# Patient Record
Sex: MALE | Race: WHITE | NOT HISPANIC OR LATINO | Employment: OTHER | URBAN - METROPOLITAN AREA
[De-identification: names, ages, dates, MRNs, and addresses within clinical notes are randomized per-mention and may not be internally consistent; named-entity substitution may affect disease eponyms.]

---

## 2017-03-23 ENCOUNTER — ALLSCRIPTS OFFICE VISIT (OUTPATIENT)
Dept: OTHER | Facility: OTHER | Age: 67
End: 2017-03-23

## 2017-03-23 LAB
BILIRUB UR QL STRIP: NORMAL
CLARITY UR: NORMAL
COLOR UR: YELLOW
GLUCOSE (HISTORICAL): NORMAL
HGB UR QL STRIP.AUTO: NORMAL
KETONES UR STRIP-MCNC: NORMAL MG/DL
LEUKOCYTE ESTERASE UR QL STRIP: NORMAL
NITRITE UR QL STRIP: NORMAL
PH UR STRIP.AUTO: 5 [PH]
PROT UR STRIP-MCNC: NORMAL MG/DL
SP GR UR STRIP.AUTO: 1.01
UROBILINOGEN UR QL STRIP.AUTO: NORMAL

## 2017-10-30 ENCOUNTER — HOSPITAL ENCOUNTER (EMERGENCY)
Facility: HOSPITAL | Age: 67
Discharge: HOME | End: 2017-10-30
Attending: EMERGENCY MEDICINE | Admitting: EMERGENCY MEDICINE
Payer: MEDICARE

## 2017-10-30 ENCOUNTER — APPOINTMENT (EMERGENCY)
Dept: RADIOLOGY | Facility: HOSPITAL | Age: 67
End: 2017-10-30
Payer: MEDICARE

## 2017-10-30 VITALS
WEIGHT: 166.5 LBS | SYSTOLIC BLOOD PRESSURE: 137 MMHG | RESPIRATION RATE: 14 BRPM | HEART RATE: 72 BPM | TEMPERATURE: 97.8 F | OXYGEN SATURATION: 98 % | DIASTOLIC BLOOD PRESSURE: 80 MMHG

## 2017-10-30 DIAGNOSIS — R07.9 CHEST PAIN, UNSPECIFIED TYPE: Primary | ICD-10-CM

## 2017-10-30 LAB
ALBUMIN SERPL BCP-MCNC: 4 G/DL (ref 3.5–5)
ALP SERPL-CCNC: 62 U/L (ref 46–116)
ALT SERPL W P-5'-P-CCNC: 45 U/L (ref 12–78)
ANION GAP SERPL CALCULATED.3IONS-SCNC: 10 MMOL/L (ref 4–13)
AST SERPL W P-5'-P-CCNC: 22 U/L (ref 5–45)
ATRIAL RATE: 75 BPM
ATRIAL RATE: 86 BPM
BASOPHILS # BLD AUTO: 0.04 THOUSANDS/ΜL (ref 0–0.1)
BASOPHILS NFR BLD AUTO: 1 % (ref 0–1)
BILIRUB SERPL-MCNC: 0.4 MG/DL (ref 0.2–1)
BUN SERPL-MCNC: 14 MG/DL (ref 5–25)
CALCIUM SERPL-MCNC: 9 MG/DL (ref 8.3–10.1)
CHLORIDE SERPL-SCNC: 105 MMOL/L (ref 100–108)
CO2 SERPL-SCNC: 24 MMOL/L (ref 21–32)
CREAT SERPL-MCNC: 0.97 MG/DL (ref 0.6–1.3)
EOSINOPHIL # BLD AUTO: 0.15 THOUSAND/ΜL (ref 0–0.61)
EOSINOPHIL NFR BLD AUTO: 2 % (ref 0–6)
ERYTHROCYTE [DISTWIDTH] IN BLOOD BY AUTOMATED COUNT: 12.7 % (ref 11.6–15.1)
GFR SERPL CREATININE-BSD FRML MDRD: 80 ML/MIN/1.73SQ M
GLUCOSE SERPL-MCNC: 113 MG/DL (ref 65–140)
HCT VFR BLD AUTO: 46.1 % (ref 36.5–49.3)
HGB BLD-MCNC: 15.8 G/DL (ref 12–17)
LYMPHOCYTES # BLD AUTO: 1.44 THOUSANDS/ΜL (ref 0.6–4.47)
LYMPHOCYTES NFR BLD AUTO: 21 % (ref 14–44)
MCH RBC QN AUTO: 30.6 PG (ref 26.8–34.3)
MCHC RBC AUTO-ENTMCNC: 34.3 G/DL (ref 31.4–37.4)
MCV RBC AUTO: 89 FL (ref 82–98)
MONOCYTES # BLD AUTO: 0.54 THOUSAND/ΜL (ref 0.17–1.22)
MONOCYTES NFR BLD AUTO: 8 % (ref 4–12)
NEUTROPHILS # BLD AUTO: 4.6 THOUSANDS/ΜL (ref 1.85–7.62)
NEUTS SEG NFR BLD AUTO: 68 % (ref 43–75)
P AXIS: 62 DEGREES
P AXIS: 64 DEGREES
PLATELET # BLD AUTO: 252 THOUSANDS/UL (ref 149–390)
PMV BLD AUTO: 9.3 FL (ref 8.9–12.7)
POTASSIUM SERPL-SCNC: 3.2 MMOL/L (ref 3.5–5.3)
PR INTERVAL: 162 MS
PR INTERVAL: 176 MS
PROT SERPL-MCNC: 7.4 G/DL (ref 6.4–8.2)
QRS AXIS: 15 DEGREES
QRS AXIS: 2 DEGREES
QRSD INTERVAL: 76 MS
QRSD INTERVAL: 82 MS
QT INTERVAL: 358 MS
QT INTERVAL: 360 MS
QTC INTERVAL: 402 MS
QTC INTERVAL: 428 MS
RBC # BLD AUTO: 5.17 MILLION/UL (ref 3.88–5.62)
SODIUM SERPL-SCNC: 139 MMOL/L (ref 136–145)
T WAVE AXIS: 57 DEGREES
T WAVE AXIS: 61 DEGREES
TROPONIN I SERPL-MCNC: <0.02 NG/ML
TROPONIN I SERPL-MCNC: <0.02 NG/ML
VENTRICULAR RATE: 75 BPM
VENTRICULAR RATE: 86 BPM
WBC # BLD AUTO: 6.77 THOUSAND/UL (ref 4.31–10.16)

## 2017-10-30 PROCEDURE — 71020 HB CHEST X-RAY 2VW FRONTAL&LATL: CPT

## 2017-10-30 PROCEDURE — 85025 COMPLETE CBC W/AUTO DIFF WBC: CPT | Performed by: EMERGENCY MEDICINE

## 2017-10-30 PROCEDURE — 36415 COLL VENOUS BLD VENIPUNCTURE: CPT

## 2017-10-30 PROCEDURE — 84484 ASSAY OF TROPONIN QUANT: CPT | Performed by: EMERGENCY MEDICINE

## 2017-10-30 PROCEDURE — 80053 COMPREHEN METABOLIC PANEL: CPT | Performed by: EMERGENCY MEDICINE

## 2017-10-30 PROCEDURE — 99285 EMERGENCY DEPT VISIT HI MDM: CPT

## 2017-10-30 PROCEDURE — 93005 ELECTROCARDIOGRAM TRACING: CPT | Performed by: EMERGENCY MEDICINE

## 2017-10-30 RX ORDER — ASPIRIN 81 MG/1
162 TABLET, CHEWABLE ORAL ONCE
Status: COMPLETED | OUTPATIENT
Start: 2017-10-30 | End: 2017-10-30

## 2017-10-30 RX ORDER — LORAZEPAM 0.5 MG/1
0.5 TABLET ORAL ONCE
Status: COMPLETED | OUTPATIENT
Start: 2017-10-30 | End: 2017-10-30

## 2017-10-30 RX ADMIN — ASPIRIN 81 MG 162 MG: 81 TABLET ORAL at 07:22

## 2017-10-30 RX ADMIN — LORAZEPAM 0.5 MG: 0.5 TABLET ORAL at 07:22

## 2017-10-30 NOTE — ED PROVIDER NOTES
History  Chief Complaint   Patient presents with    Chest Pain     Ptpresents to ED for evaluation and treatment of chest pressure since 0500     79 yr male  With no mh-- states somewhere between 5 and 6 am with gradual onset of ant to left sided chest pressure -- which has been constant- no radiation - no n/v/- no sob/ no diaphoresis--  2 episodes of heartburn symptoms with meals over the last several weeks- no progressive dysphagia/ weight loss- melena- vomitus - no uri/cough - no hx of vte- no pleuritic or abrupt cp         History provided by:  Patient   used: No    Chest Pain   Associated symptoms: no cough, no palpitations and no shortness of breath        None       No past medical history on file  Past Surgical History:   Procedure Laterality Date    CHOLECYSTECTOMY         No family history on file  I have reviewed and agree with the history as documented  Social History   Substance Use Topics    Smoking status: Never Smoker    Smokeless tobacco: Not on file    Alcohol use 2 4 oz/week     4 Cans of beer per week        Review of Systems   Constitutional: Negative  HENT: Negative  Eyes: Negative  Respiratory: Negative  Negative for apnea, cough, choking, chest tightness, shortness of breath, wheezing and stridor  Cardiovascular: Positive for chest pain  Negative for palpitations and leg swelling  Gastrointestinal: Negative  Endocrine: Negative  Genitourinary: Negative  Musculoskeletal: Negative  Skin: Negative  Allergic/Immunologic: Negative  Neurological: Negative  Hematological: Negative  Psychiatric/Behavioral: Negative          Physical Exam  ED Triage Vitals   Temperature Pulse Respirations Blood Pressure SpO2   10/30/17 0720 10/30/17 0654 10/30/17 0654 10/30/17 0654 10/30/17 0654   97 8 °F (36 6 °C) 90 16 159/86 100 %      Temp Source Heart Rate Source Patient Position - Orthostatic VS BP Location FiO2 (%)   10/30/17 0720 10/30/17 5879 10/30/17 0654 10/30/17 0654 --   Oral Monitor Lying Right arm       Pain Score       --                  Orthostatic Vital Signs  Vitals:    10/30/17 0654   BP: 159/86   Pulse: 90   Patient Position - Orthostatic VS: Lying       Physical Exam   Constitutional: He is oriented to person, place, and time  He appears well-developed and well-nourished  No distress  avss-- pulse ox  100 % on ra- intepretation is normal- no intervention - htnsion    HENT:   Head: Normocephalic and atraumatic  Eyes: Conjunctivae and EOM are normal  Pupils are equal, round, and reactive to light  Right eye exhibits no discharge  Left eye exhibits no discharge  No scleral icterus  Mm pink   Neck: Normal range of motion  Neck supple  No JVD present  No tracheal deviation present  No thyromegaly present  Cardiovascular: Normal rate, regular rhythm, normal heart sounds and intact distal pulses  Exam reveals no gallop and no friction rub  No murmur heard  Pulmonary/Chest: Effort normal and breath sounds normal  No stridor  No respiratory distress  He has no wheezes  He has no rales  He exhibits no tenderness  Abdominal: Soft  Bowel sounds are normal  He exhibits no distension and no mass  There is no tenderness  There is no rebound and no guarding  No hernia  Musculoskeletal: Normal range of motion  He exhibits no edema, tenderness or deformity  - normal/equal bilateral radial/dp pulses- no ble edema/claf tenderness/assym/ erythema   Lymphadenopathy:     He has no cervical adenopathy  Neurological: He is alert and oriented to person, place, and time  No cranial nerve deficit or sensory deficit  He exhibits normal muscle tone  Coordination normal    Skin: Skin is warm  Capillary refill takes less than 2 seconds  No rash noted  He is not diaphoretic  No erythema  No pallor  Psychiatric: His behavior is normal  Judgment and thought content normal    Mildly anxious   Nursing note and vitals reviewed        ED Medications  Medications   LORazepam (ATIVAN) tablet 0 5 mg (0 5 mg Oral Given 10/30/17 0722)   aspirin chewable tablet 162 mg (162 mg Oral Given 10/30/17 0722)       Diagnostic Studies  Results Reviewed     Procedure Component Value Units Date/Time    Troponin I [[id number]]  (Normal) Collected:  10/30/17 0658    Lab Status:  Final result Specimen:  Blood from Arm, Left Updated:  10/30/17 0727     Troponin I <0 02 ng/mL     Narrative:         [employer] Chemistry analyzer 99% cutoff is > 0 04 ng/mL in network labs    o cTnI 99% cutoff is useful only when applied to patients in the clinical setting of myocardial ischemia  o cTnI 99% cutoff should be interpreted in the context of clinical history, ECG findings and possibly cardiac imaging to establish correct diagnosis  o cTnI 99% cutoff may be suggestive but clearly not indicative of a coronary event without the clinical setting of myocardial ischemia  Comprehensive metabolic panel [[id number]]  (Abnormal) Collected:  10/30/17 0658    Lab Status:  Final result Specimen:  Blood from Arm, Left Updated:  10/30/17 6580     Sodium 139 mmol/L      Potassium 3 2 (L) mmol/L      Chloride 105 mmol/L      CO2 24 mmol/L      Anion Gap 10 mmol/L      BUN 14 mg/dL      Creatinine 0 97 mg/dL      Glucose 113 mg/dL      Calcium 9 0 mg/dL      AST 22 U/L      ALT 45 U/L      Alkaline Phosphatase 62 U/L      Total Protein 7 4 g/dL      Albumin 4 0 g/dL      Total Bilirubin 0 40 mg/dL      eGFR 80 ml/min/1 73sq m     Narrative:         National Kidney Disease Education Program recommendations are as follows:  GFR calculation is accurate only with a steady state creatinine  Chronic Kidney disease less than 60 ml/min/1 73 sq  meters  Kidney failure less than 15 ml/min/1 73 sq  meters      CBC and differential [[id number]]  (Normal) Collected:  10/30/17 0658    Lab Status:  Final result Specimen:  Blood from Arm, Left Updated:  10/30/17 0705     WBC 6 77 Thousand/uL      RBC 5 17 Million/uL Hemoglobin 15 8 g/dL      Hematocrit 46 1 %      MCV 89 fL      MCH 30 6 pg      MCHC 34 3 g/dL      RDW 12 7 %      MPV 9 3 fL      Platelets 355 Thousands/uL      Neutrophils Relative 68 %      Lymphocytes Relative 21 %      Monocytes Relative 8 %      Eosinophils Relative 2 %      Basophils Relative 1 %      Neutrophils Absolute 4 60 Thousands/µL      Lymphocytes Absolute 1 44 Thousands/µL      Monocytes Absolute 0 54 Thousand/µL      Eosinophils Absolute 0 15 Thousand/µL      Basophils Absolute 0 04 Thousands/µL                  X-ray chest 2 views    (Results Pending)              Procedures  Procedures       Phone Contacts  ED Phone Contact    ED Course  ED Course as of Oct 30 1038   Mon Oct 30, 2017   0994 Cxr pa/lat-  normal mediastinum- no evidence of free/sq air- no infiltrate/ ptx/ pulm edema/ pleural effusions    0839 Er md note- d/w pt and wife disposition options- offered cp obs admit- 2 enz er eval with oupt stress test- - pt will discuss with wife and check back     80 Er md note- pt  decides to recheck 2nd trop in 3 hrs -- will re order-- pt re-evaluated--  states intermitent throbbing  in chest /back                                 MDM  The patient presented with a condition in which there was a high probability of imminent or life-threatening deterioration, and critical care services (excluding separately billable procedures) totalled 30-74 minutes  Disposition  Final diagnoses:   None     ED Disposition     None      Follow-up Information    None       Patient's Medications    No medications on file     No discharge procedures on file      ED Provider  Electronically Signed by           Юлия Soriano MD  10/30/17 0458

## 2017-10-30 NOTE — ED NOTES
Called lab to check status of troponin, per Kishor is is running       Jackson Gavin RN  10/30/17 8325

## 2017-10-30 NOTE — ED PROCEDURE NOTE
PROCEDURE  ECG 12 Lead Documentation  Date/Time: 10/30/2017 10:39 AM  Performed by: Wyatt Wilburn  Authorized by: Wyatt Wilburn     Indications / Diagnosis:  2ND ER ECG  Previous ECG:     Previous ECG:  Compared to current    Comparison ECG info:  COMPARED  TO INITIAL 12 LEAD ECG- NO SIGN CHAGNES  Interpretation:     Interpretation: non-specific    Rate:     ECG rate:  75    ECG rate assessment: normal    Rhythm:     Rhythm: sinus rhythm    Ectopy:     Ectopy: none    QRS:     QRS axis:  Normal    QRS intervals:  Normal  Conduction:     Conduction: normal    ST segments:     ST segments:  Normal  T waves:     T waves: flattening      Flattening:  III and V1  Q waves:     Q waves:  V1  Other findings:     Other findings: U wave    Comments:      NO ECG SIGNS OF ISCHEMIA/ INJURY / [address]

## 2017-10-30 NOTE — DISCHARGE INSTRUCTIONS
DIAGNOSIS: CHEST PAIN     - PLEASE KEEP YOUR APPOINTMENT WITH YOUR PRIMARY DOCTOR  FOR TOMORROW- TELL HIM/HER ABOUT CHEST PAIN- NEGATIVE ER WORKUP AND ARE SCHEDULING AN OUTPATIENT STRESS TEST     - PLEASE CALL CENTRAL SCHEDULING WHEN YOU GET HOME- [phone] TO SCHEDULE AN  OUTPATIENT STRESS TEST- NON EXERTIONAL- WHEN THEY CALL YOU B ACK - PLEASE  BRING THE PRESCRIPTION WITH YOU    - BASED ON OUR NEGATIVE ER WORKUP-- YOU ARE A LOW RISK CHEST PAIN PATIENT - THERE IS NO SUCH THING AS A NO RISK CHEST PAIN PATIENT- PLEASE RETURN TO  THE ER FOR ANY NEW/ WORSENING/CONCERNING SYMPTOMS TO YOU

## 2017-10-30 NOTE — ED PROCEDURE NOTE
PROCEDURE  ECG 12 Lead Documentation  Date/Time: 10/30/2017 7:38 AM  Performed by: Wyatt Wilburn  Authorized by: Jesika GREENFIELD     Indications / Diagnosis:  Cp  ECG reviewed by me, the ED Provider: yes    Patient location:  ED and bedside  Previous ECG:     Previous ECG:  Unavailable  Interpretation:     Interpretation: non-specific    Rate:     ECG rate:  86    ECG rate assessment: normal    Rhythm:     Rhythm: sinus rhythm    Ectopy:     Ectopy: none    QRS:     QRS axis:  Normal    QRS intervals:  Normal  Conduction:     Conduction: normal    ST segments:     ST segments:  Normal  T waves:     T waves: flattening      Flattening:  V1  Q waves:     Q waves:  V1  Other findings:     Other findings: U wave    Comments:      No ecg signs of ischemia/ injury / r heart strain

## 2017-10-30 NOTE — ED PROCEDURE NOTE
PROCEDURE  CriticalCare Time  Performed by: Sophy Pedro  Authorized by: Sophy Pedro     Critical care provider statement:     Critical care time (minutes):  35    Critical care start time:  10/30/2017 10:51 AM    Critical care end time:  10/30/2017 11:26 AM    Critical care time was exclusive of:  Separately billable procedures and treating other patients and teaching time    Critical care was time spent personally by me on the following activities:  Examination of patient, development of treatment plan with patient or surrogate, re-evaluation of patient's condition, ordering and review of laboratory studies and ordering and review of radiographic studies    I assumed direction of critical care for this patient from another provider in my specialty: no

## 2017-11-02 ENCOUNTER — HOSPITAL ENCOUNTER (OUTPATIENT)
Dept: NON INVASIVE DIAGNOSTICS | Facility: CLINIC | Age: 67
Discharge: HOME | End: 2017-11-02
Payer: MEDICARE

## 2017-11-02 DIAGNOSIS — R07.9 CHEST PAIN, UNSPECIFIED TYPE: ICD-10-CM

## 2017-11-02 LAB
CHEST PAIN STATEMENT: NORMAL
MAX DIASTOLIC BP: 76 MMHG
MAX HEART RATE: 122 BPM
MAX PREDICTED HEART RATE: 153 BPM
MAX. SYSTOLIC BP: 124 MMHG
PROTOCOL NAME: NORMAL
REASON FOR TERMINATION: NORMAL
TARGET HR FORMULA: NORMAL
TEST INDICATION: NORMAL
TIME IN EXERCISE PHASE: 180 S

## 2017-11-02 PROCEDURE — A9502 TC99M TETROFOSMIN: HCPCS

## 2017-11-02 PROCEDURE — 93017 CV STRESS TEST TRACING ONLY: CPT

## 2017-11-02 PROCEDURE — 78452 HT MUSCLE IMAGE SPECT MULT: CPT

## 2017-11-02 RX ADMIN — REGADENOSON 0.4 MG: 0.08 INJECTION, SOLUTION INTRAVENOUS at 09:20

## 2018-01-15 VITALS
WEIGHT: 159 LBS | HEART RATE: 56 BPM | DIASTOLIC BLOOD PRESSURE: 84 MMHG | HEIGHT: 66 IN | BODY MASS INDEX: 25.55 KG/M2 | SYSTOLIC BLOOD PRESSURE: 136 MMHG

## 2018-02-15 ENCOUNTER — APPOINTMENT (EMERGENCY)
Dept: ULTRASOUND IMAGING | Facility: HOSPITAL | Age: 68
End: 2018-02-15
Payer: MEDICARE

## 2018-02-15 ENCOUNTER — HOSPITAL ENCOUNTER (EMERGENCY)
Facility: HOSPITAL | Age: 68
Discharge: HOME | End: 2018-02-15
Attending: EMERGENCY MEDICINE
Payer: MEDICARE

## 2018-02-15 VITALS
HEART RATE: 97 BPM | TEMPERATURE: 97.8 F | HEIGHT: 67 IN | SYSTOLIC BLOOD PRESSURE: 135 MMHG | OXYGEN SATURATION: 97 % | RESPIRATION RATE: 18 BRPM | WEIGHT: 163 LBS | BODY MASS INDEX: 25.58 KG/M2 | DIASTOLIC BLOOD PRESSURE: 78 MMHG

## 2018-02-15 DIAGNOSIS — M79.662 PAIN IN LEFT LOWER LEG: Primary | ICD-10-CM

## 2018-02-15 PROCEDURE — 93971 EXTREMITY STUDY: CPT

## 2018-02-15 PROCEDURE — 99283 EMERGENCY DEPT VISIT LOW MDM: CPT

## 2018-02-15 RX ORDER — CHLORAL HYDRATE 500 MG
1000 CAPSULE ORAL DAILY
COMMUNITY

## 2018-02-15 RX ORDER — BILBERRY FRUIT 1000 MG
950 CAPSULE ORAL
COMMUNITY
End: 2019-06-04

## 2018-02-15 RX ORDER — LORAZEPAM 0.5 MG/1
TABLET ORAL DAILY PRN
COMMUNITY

## 2018-02-16 PROCEDURE — 93971 EXTREMITY STUDY: CPT | Performed by: SURGERY

## 2018-02-16 NOTE — DISCHARGE INSTRUCTIONS
Leg Pain   WHAT YOU NEED TO KNOW:   Leg pain may be caused by a variety of health conditions  Your tests did not show any broken bones or blood clots  DISCHARGE INSTRUCTIONS:   Return to the emergency department if:   · You have a fever  · Your leg starts to swell  · Your leg pain gets worse  · You have numbness or tingling in your leg or toes  · You cannot put any weight on or move your leg  Contact your healthcare provider if:   · Your pain does not decrease, even after treatment  · You have questions or concerns about your condition or care  Medicines:   · NSAIDs , such as ibuprofen, help decrease swelling, pain, and fever  This medicine is available with or without a doctor's order  NSAIDs can cause stomach bleeding or kidney problems in certain people  If you take blood thinner medicine, always ask your healthcare provider if NSAIDs are safe for you  Always read the medicine label and follow directions  · Take your medicine as directed  Contact your healthcare provider if you think your medicine is not helping or if you have side effects  Tell him of her if you are allergic to any medicine  Keep a list of the medicines, vitamins, and herbs you take  Include the amounts, and when and why you take them  Bring the list or the pill bottles to follow-up visits  Carry your medicine list with you in case of an emergency  Follow up with your healthcare provider as directed: You may need more tests to find the cause of your leg pain  You may need to see an orthopedic specialist or a physical therapist  Write down your questions so you remember to ask them during your visits  Manage your leg pain:   · Rest  your injured leg so that it can heal  You may need an immobilizer, brace, or splint to limit the movement of your leg  You may need to avoid putting any weight on your leg for at least 48 hours  Return to normal activities as directed      · Ice  the injury for 20 minutes every 4 hours for up to 24 hours, or as directed  Use an ice pack, or put crushed ice in a plastic bag  Cover it with a towel to protect your skin  Ice helps prevent tissue damage and decreases swelling and pain  · Elevate  your injured leg above the level of your heart as often as you can  This will help decrease swelling and pain  If possible, prop your leg on pillows or blankets to keep the area elevated comfortably  · Use assistive devices as directed  You may need to use a cane or crutches  Assistive devices help decrease pain and pressure on your leg when you walk  Ask your healthcare provider for more information about assistive devices and how to use them correctly  · Maintain a healthy weight  Extra body weight can cause pressure and pain in your hip, knee, and ankle joints  Ask your healthcare provider how much you should weigh  Ask him to help you create a weight loss plan if you are overweight  © 2017 [employer] Information is for End User's use only and may not be sold, redistributed or otherwise used for commercial purposes  All illustrations and images included in CareNotes® are the copyrighted property of [employer]  or [employer]  The above information is an  only  It is not intended as medical advice for individual conditions or treatments  Talk to your doctor, nurse or pharmacist before following any medical regimen to see if it is safe and effective for you

## 2018-02-16 NOTE — ED PROVIDER NOTES
History  Chief Complaint   Patient presents with    Leg Pain     c/o on/off sharp pain in left calf x 2 days  Pt denies CP, SOB, trauma or recent long distance traveling  Pt had right foot surgery on Nov 10th and "I just want to make sure I don't have a blood clot" per pt  Leg Pain   Location:  Leg  Time since incident:  2 days  Injury: no    Leg location:  L lower leg  Pain details:     Quality:  Shooting and sharp    Radiates to:  Does not radiate    Severity:  Moderate    Onset quality:  Sudden    Duration:  2 days    Timing:  Intermittent    Progression:  Waxing and waning  Chronicity:  New  Dislocation: no    Prior injury to area:  No  Relieved by:  Nothing  Worsened by:  Nothing  Ineffective treatments:  None tried  Associated symptoms: no back pain, no decreased ROM, no fatigue, no fever, no itching, no muscle weakness, no neck pain, no numbness, no stiffness, no swelling and no tingling        Prior to Admission Medications   Prescriptions Last Dose Informant Patient Reported? Taking? LORazepam (ATIVAN) 0 5 mg tablet   Yes Yes   Sig: Take by mouth daily as needed for anxiety   Omega-3 Fatty Acids (FISH OIL) 1,000 mg   Yes Yes   Sig: Take 1,000 mg by mouth daily   Saw [place] 1000 MG CAPS   Yes Yes   Sig: Take 950 mg by mouth   cholecalciferol (VITAMIN D3) 1,000 units tablet   Yes Yes   Sig: Take 1,000 Units by mouth daily      Facility-Administered Medications: None       History reviewed  No pertinent past medical history  Past Surgical History:   Procedure Laterality Date    CHOLECYSTECTOMY         History reviewed  No pertinent family history  I have reviewed and agree with the history as documented      Social History   Substance Use Topics    Smoking status: Current Some Day Smoker     Types: Cigars    Smokeless tobacco: Never Used    Alcohol use 2 4 oz/week     4 Cans of beer per week      Comment: socially        Review of Systems   Constitutional: Negative for activity change, appetite change, chills, fatigue and fever  HENT: Negative for ear pain, sneezing and sore throat  Eyes: Negative for pain and visual disturbance  Respiratory: Negative for cough and shortness of breath  Cardiovascular: Negative for chest pain and palpitations  Gastrointestinal: Negative for abdominal pain, blood in stool, constipation, diarrhea, nausea and vomiting  Genitourinary: Negative for dysuria and hematuria  Musculoskeletal: Negative for arthralgias, back pain, gait problem, joint swelling, neck pain, neck stiffness and stiffness  Skin: Negative for itching, rash and wound  Neurological: Negative for dizziness, weakness, light-headedness, numbness and headaches  All other systems reviewed and are negative  Physical Exam  ED Triage Vitals [02/15/18 1725]   Temperature Pulse Respirations Blood Pressure SpO2   97 8 °F (36 6 °C) 93 18 163/88 99 %      Temp Source Heart Rate Source Patient Position - Orthostatic VS BP Location FiO2 (%)   Oral Monitor Sitting Left arm --      Pain Score       No Pain           Orthostatic Vital Signs  Vitals:    02/15/18 1725 02/15/18 2018   BP: 163/88 135/78   Pulse: 93 97   Patient Position - Orthostatic VS: Sitting Sitting       Physical Exam   Constitutional: He is oriented to person, place, and time  He appears well-developed and well-nourished  No distress  HENT:   Head: Normocephalic and atraumatic  Nose: Nose normal    Eyes: Conjunctivae and EOM are normal  Pupils are equal, round, and reactive to light  Neck: Normal range of motion  Neck supple  Cardiovascular: Normal rate, regular rhythm, normal heart sounds and intact distal pulses  Exam reveals no gallop and no friction rub  No murmur heard  Pulmonary/Chest: Effort normal and breath sounds normal  No respiratory distress  He has no wheezes  He has no rales  Abdominal: Soft  He exhibits no distension  There is no tenderness  Musculoskeletal: Normal range of motion   He exhibits no edema, tenderness or deformity  Mild tenderness to palpation of medial aspect of right proximal calf  No swelling, redness, or edema noted  Lymphadenopathy:     He has no cervical adenopathy  Neurological: He is alert and oriented to person, place, and time  Skin: Skin is warm and dry  Capillary refill takes less than 2 seconds  He is not diaphoretic  No erythema  No pallor  Nursing note and vitals reviewed  ED Medications  Medications - No data to display    Diagnostic Studies  Results Reviewed     None                 VAS lower limb venous duplex study, unilateral/limited   Final Result by Hudson Langley DO (02/16 6704)                 Procedures  Procedures       Phone Contacts  ED Phone Contact    ED Course  ED Course as of Feb 21 1928   Thu Feb 15, 2018   2037 No DVT seen on ultrasound  Patient states he has an appointment with PCP tomorrow  I believe he is appropriate for discharge at this point follow up PCP tomorrow to discuss other causes of leg pain including electrolytes/vitamin abnormalities, neuropathy, and other causes  Patient agrees this plan and is happy that there is no DVT, as this is what brought him to the emergency department  MDM  Number of Diagnoses or Management Options  Pain in left lower leg: new and requires workup  Diagnosis management comments: ddx to include but not limited to: gastrocnemius tear, DVT, SVT, gastrocnemius sprain    PT is a 70yo active male who presents to the ED for evaluation of calf pain  Pt states that for the past 2 days he has been having medial left calf pain, randomly  He states it is a shooting pain that feels like knives in  His legs  Someone told him it may be a blood clot, which worried him and sent him to the ED  Patient with 0 risk factors for blood clot other than age  No recent trvels, prolonged immobilizations, leg injury, or previous DVT   Tenderness to palpation of left medial calf along great saphenous  Will get doppler US to rule out DVT>       Amount and/or Complexity of Data Reviewed  Tests in the radiology section of CPT®: ordered and reviewed    Risk of Complications, Morbidity, and/or Mortality  Presenting problems: moderate  Diagnostic procedures: moderate  Management options: low    Patient Progress  Patient progress: stable    CritCare Time    Disposition  Final diagnoses:   Pain in left lower leg     Time reflects when diagnosis was documented in both MDM as applicable and the Disposition within this note     Time User Action Codes Description Comment    2/15/2018  8:26 PM Vee Alexander Add [[id number]] Pain in left lower leg       ED Disposition     ED Disposition Condition Comment    Discharge  [address] discharge to home/self care  Condition at discharge: Stable        Follow-up Information     Follow up With Specialties Details Why Contact Info Additional Martir 13, DO Internal Medicine Go in 1 day appt tomorrow [address] [facility] Emergency Department Emergency Medicine  If symptoms worsen [address]  [phone] [facility],  [address], [place], [place], [address]        Discharge Medication List as of 2/15/2018  8:26 PM      CONTINUE these medications which have NOT CHANGED    Details   cholecalciferol (VITAMIN D3) 1,000 units tablet Take 1,000 Units by mouth daily, Historical Med      LORazepam (ATIVAN) 0 5 mg tablet Take by mouth daily as needed for anxiety, Historical Med      Omega-3 Fatty Acids (FISH OIL) 1,000 mg Take 1,000 mg by mouth daily, Historical Med      Saw [place] 1000 MG CAPS Take 950 mg by mouth, Historical Med           No discharge procedures on file      ED Provider  Electronically Signed by           Ankur Siegel PA-C  02/21/18 1929

## 2018-03-23 DIAGNOSIS — R31.29 OTHER MICROSCOPIC HEMATURIA: ICD-10-CM

## 2018-03-26 DIAGNOSIS — R31.29 MICROHEMATURIA: Primary | ICD-10-CM

## 2018-03-26 DIAGNOSIS — R30.0 DYSURIA: ICD-10-CM

## 2018-03-27 RX ORDER — MAG HYDROX/ALUMINUM HYD/SIMETH 400-400-40
SUSPENSION, ORAL (FINAL DOSE FORM) ORAL
COMMUNITY

## 2018-03-27 RX ORDER — LORATADINE 10 MG/1
TABLET ORAL
COMMUNITY

## 2018-03-27 RX ORDER — DIMENHYDRINATE 50 MG
TABLET ORAL
COMMUNITY

## 2018-03-27 RX ORDER — FLUTICASONE PROPIONATE 50 MCG
SPRAY, SUSPENSION (ML) NASAL
COMMUNITY

## 2018-03-30 ENCOUNTER — OFFICE VISIT (OUTPATIENT)
Dept: UROLOGY | Facility: CLINIC | Age: 68
End: 2018-03-30
Payer: MEDICARE

## 2018-03-30 VITALS
HEART RATE: 68 BPM | BODY MASS INDEX: 25.74 KG/M2 | WEIGHT: 164 LBS | DIASTOLIC BLOOD PRESSURE: 80 MMHG | HEIGHT: 67 IN | SYSTOLIC BLOOD PRESSURE: 140 MMHG

## 2018-03-30 DIAGNOSIS — Z80.42 FAMILY HISTORY OF PROSTATE CANCER IN FATHER: ICD-10-CM

## 2018-03-30 DIAGNOSIS — R31.29 MICROSCOPIC HEMATURIA: Primary | ICD-10-CM

## 2018-03-30 DIAGNOSIS — Z12.5 SCREENING FOR PROSTATE CANCER: ICD-10-CM

## 2018-03-30 PROCEDURE — 99213 OFFICE O/P EST LOW 20 MIN: CPT | Performed by: PHYSICIAN ASSISTANT

## 2018-03-30 NOTE — PROGRESS NOTES
1  Microscopic hematuria     2  Family history of prostate cancer in father     1  Screening for prostate cancer         Assessment and plan:       1  History of microhematuria s/p complete hematuria evaluation 2016 -- managed by Dr Jone Nix  - I was happy to review with the patient his most recent UA is negative for any microhematuria  - We discussed that according to AUA guidelines, that routine screening is able to be discontinued after two subsequent negative urinalysis  - he is aware to contact us in the future with new microhematuria or gross hematuria   2  Prostate cancer screening  - PSA is low with benign examination in the office today  - Patient is aware routine prostate cancer screening should continue annually between 54-65 years of age with PSA and SOPHIA  He will follow up with urology on as as needed basis  Continue to undergo routine prostate cancer screening annually with PCP  Aware to contact us with any further urologic concern  All questions answered  Winston Deng PA-C      Chief Complaint     Chief Complaint   Patient presents with   Leonardonikki Cardenas Microhematuria    Difficulty Urinating     History of Present Illness     Mandi Arias is a 79 y o  Male patient of Dr Jone Nix with a history of microhematuria presenting for 1 year follow up  Patient was previously found to have microhematuria  He underwent a complete hematuria workup in 2016 with Dr Fanny Ramirez including CT and cystoscopy  Father had history of prostate cancer  He is a former smoker having quit some 30-40 years ago  Urinalysis with microscopy obtained 3/26/18 was revealed 0-2 RBC/hpf  His most recent PSA was 0 72 (3/26/18)  Patient is overall comfortable with his urination  Feels like he has a storng stream, feels empty after urination, and has nocturia 1-2x nightly  He notes occasional hesitancy  Denies any dysuria, gross hematuria, urgency, flank pain, or weight loss   Patient did have some LUTS with initiation of antihistamine  Since he discontinued his antihistamine, his urinary symptoms returned to baseline  Laboratory     Lab Results   Component Value Date    CREATININE 0 97 10/30/2017       Review of Systems     Review of Systems   Constitutional: Negative for activity change, appetite change, chills, diaphoresis, fatigue, fever and unexpected weight change  Respiratory: Negative for chest tightness and shortness of breath  Cardiovascular: Negative for chest pain, palpitations and leg swelling  Gastrointestinal: Negative for abdominal distention, abdominal pain, constipation, diarrhea, nausea and vomiting  Genitourinary: Negative for decreased urine volume, difficulty urinating, dysuria, enuresis, flank pain, frequency, genital sores, hematuria and urgency  Musculoskeletal: Negative for back pain, gait problem and myalgias  Skin: Negative for color change, pallor, rash and wound  Psychiatric/Behavioral: Negative for behavioral problems  The patient is not nervous/anxious  Allergies     No Known Allergies    Physical Exam     Physical Exam   Constitutional: He is oriented to person, place, and time  He appears well-developed and well-nourished  No distress  HENT:   Head: Normocephalic and atraumatic  Eyes: Conjunctivae are normal    Neck: Normal range of motion  No tracheal deviation present  Pulmonary/Chest: Effort normal    Genitourinary:   Genitourinary Comments: Prostate: 20g, smooth, symmetric, no nodules   Musculoskeletal: Normal range of motion  He exhibits no edema or deformity  Neurological: He is alert and oriented to person, place, and time  Skin: Skin is warm and dry  No rash noted  He is not diaphoretic  No erythema  Psychiatric: He has a normal mood and affect   His behavior is normal          Vital Signs     Vitals:    03/30/18 0905   BP: 140/80   Pulse: 68   Weight: 74 4 kg (164 lb)   Height: 5' 7" (1 702 m)         Current Medications       Current Outpatient Prescriptions:     cholecalciferol (VITAMIN D3) 1,000 units tablet, Take 1,000 Units by mouth daily, Disp: , Rfl:     Flaxseed, Linseed, (FLAX SEED OIL) 1000 MG CAPS, Take by mouth, Disp: , Rfl:     fluticasone (FLONASE) 50 mcg/act nasal spray, into each nostril, Disp: , Rfl:     LORazepam (ATIVAN) 0 5 mg tablet, Take by mouth daily as needed for anxiety, Disp: , Rfl:     Omega-3 Fatty Acids (FISH OIL) 1,000 mg, Take 1,000 mg by mouth daily, Disp: , Rfl:     Saw [place] 450 MG CAPS, Take by mouth, Disp: , Rfl:     loratadine (CLARITIN) 10 mg tablet, Take by mouth, Disp: , Rfl:     Saw [place] 1000 MG CAPS, Take 950 mg by mouth, Disp: , Rfl:       Active Problems     Patient Active Problem List   Diagnosis    Family history of prostate cancer in father    Microscopic hematuria    Screening for prostate cancer         Past Medical History     Past Medical History:   Diagnosis Date    History of arthritis     History of degenerative disc disease     Sexual dysfunction     Stomach problems          Surgical History     Past Surgical History:   Procedure Laterality Date    CHOLECYSTECTOMY      COLONOSCOPY           Family History     Family History   Problem Relation Age of Onset    Prostate cancer Father     Nephrolithiasis Brother          Social History     Social History       Radiology

## 2019-03-02 ENCOUNTER — APPOINTMENT (EMERGENCY)
Dept: RADIOLOGY | Facility: HOSPITAL | Age: 69
End: 2019-03-02
Payer: MEDICARE

## 2019-03-02 ENCOUNTER — HOSPITAL ENCOUNTER (EMERGENCY)
Facility: HOSPITAL | Age: 69
Discharge: HOME | End: 2019-03-02
Attending: EMERGENCY MEDICINE | Admitting: EMERGENCY MEDICINE
Payer: MEDICARE

## 2019-03-02 VITALS
DIASTOLIC BLOOD PRESSURE: 79 MMHG | SYSTOLIC BLOOD PRESSURE: 125 MMHG | BODY MASS INDEX: 23.38 KG/M2 | WEIGHT: 149.25 LBS | HEART RATE: 84 BPM | TEMPERATURE: 97.8 F | RESPIRATION RATE: 17 BRPM | OXYGEN SATURATION: 98 %

## 2019-03-02 DIAGNOSIS — R53.1 GENERALIZED WEAKNESS: ICD-10-CM

## 2019-03-02 DIAGNOSIS — R19.7 DIARRHEA: Primary | ICD-10-CM

## 2019-03-02 LAB
ALBUMIN SERPL BCP-MCNC: 4.1 G/DL (ref 3.5–5)
ALP SERPL-CCNC: 54 U/L (ref 46–116)
ALT SERPL W P-5'-P-CCNC: 42 U/L (ref 12–78)
ANION GAP SERPL CALCULATED.3IONS-SCNC: 9 MMOL/L (ref 4–13)
AST SERPL W P-5'-P-CCNC: 30 U/L (ref 5–45)
ATRIAL RATE: 65 BPM
BASOPHILS # BLD AUTO: 0.02 THOUSANDS/ΜL (ref 0–0.1)
BASOPHILS NFR BLD AUTO: 0 % (ref 0–1)
BILIRUB SERPL-MCNC: 0.6 MG/DL (ref 0.2–1)
BUN SERPL-MCNC: 13 MG/DL (ref 5–25)
CALCIUM SERPL-MCNC: 8.9 MG/DL (ref 8.3–10.1)
CHLORIDE SERPL-SCNC: 105 MMOL/L (ref 100–108)
CO2 SERPL-SCNC: 23 MMOL/L (ref 21–32)
CREAT SERPL-MCNC: 0.96 MG/DL (ref 0.6–1.3)
EOSINOPHIL # BLD AUTO: 0.01 THOUSAND/ΜL (ref 0–0.61)
EOSINOPHIL NFR BLD AUTO: 0 % (ref 0–6)
ERYTHROCYTE [DISTWIDTH] IN BLOOD BY AUTOMATED COUNT: 12.5 % (ref 11.6–15.1)
GFR SERPL CREATININE-BSD FRML MDRD: 81 ML/MIN/1.73SQ M
GLUCOSE SERPL-MCNC: 103 MG/DL (ref 65–140)
HCT VFR BLD AUTO: 44.2 % (ref 36.5–49.3)
HGB BLD-MCNC: 15.5 G/DL (ref 12–17)
IMM GRANULOCYTES # BLD AUTO: 0.01 THOUSAND/UL (ref 0–0.2)
IMM GRANULOCYTES NFR BLD AUTO: 0 % (ref 0–2)
LIPASE SERPL-CCNC: 92 U/L (ref 73–393)
LYMPHOCYTES # BLD AUTO: 1.08 THOUSANDS/ΜL (ref 0.6–4.47)
LYMPHOCYTES NFR BLD AUTO: 19 % (ref 14–44)
MCH RBC QN AUTO: 30.6 PG (ref 26.8–34.3)
MCHC RBC AUTO-ENTMCNC: 35.1 G/DL (ref 31.4–37.4)
MCV RBC AUTO: 87 FL (ref 82–98)
MONOCYTES # BLD AUTO: 0.41 THOUSAND/ΜL (ref 0.17–1.22)
MONOCYTES NFR BLD AUTO: 7 % (ref 4–12)
NEUTROPHILS # BLD AUTO: 4.15 THOUSANDS/ΜL (ref 1.85–7.62)
NEUTS SEG NFR BLD AUTO: 74 % (ref 43–75)
NRBC BLD AUTO-RTO: 0 /100 WBCS
P AXIS: 80 DEGREES
PLATELET # BLD AUTO: 257 THOUSANDS/UL (ref 149–390)
PMV BLD AUTO: 9 FL (ref 8.9–12.7)
POTASSIUM SERPL-SCNC: 4 MMOL/L (ref 3.5–5.3)
PR INTERVAL: 166 MS
PROT SERPL-MCNC: 7.4 G/DL (ref 6.4–8.2)
QRS AXIS: 41 DEGREES
QRSD INTERVAL: 82 MS
QT INTERVAL: 394 MS
QTC INTERVAL: 409 MS
RBC # BLD AUTO: 5.06 MILLION/UL (ref 3.88–5.62)
SODIUM SERPL-SCNC: 137 MMOL/L (ref 136–145)
T WAVE AXIS: 70 DEGREES
TROPONIN I SERPL-MCNC: <0.02 NG/ML
TSH SERPL DL<=0.05 MIU/L-ACNC: 2.11 UIU/ML (ref 0.36–3.74)
VENTRICULAR RATE: 65 BPM
WBC # BLD AUTO: 5.68 THOUSAND/UL (ref 4.31–10.16)

## 2019-03-02 PROCEDURE — 71046 X-RAY EXAM CHEST 2 VIEWS: CPT

## 2019-03-02 PROCEDURE — 93010 ELECTROCARDIOGRAM REPORT: CPT | Performed by: INTERNAL MEDICINE

## 2019-03-02 PROCEDURE — 80053 COMPREHEN METABOLIC PANEL: CPT | Performed by: PHYSICIAN ASSISTANT

## 2019-03-02 PROCEDURE — 84484 ASSAY OF TROPONIN QUANT: CPT | Performed by: PHYSICIAN ASSISTANT

## 2019-03-02 PROCEDURE — 83690 ASSAY OF LIPASE: CPT | Performed by: PHYSICIAN ASSISTANT

## 2019-03-02 PROCEDURE — 84443 ASSAY THYROID STIM HORMONE: CPT | Performed by: PHYSICIAN ASSISTANT

## 2019-03-02 PROCEDURE — 99285 EMERGENCY DEPT VISIT HI MDM: CPT

## 2019-03-02 PROCEDURE — 36415 COLL VENOUS BLD VENIPUNCTURE: CPT | Performed by: PHYSICIAN ASSISTANT

## 2019-03-02 PROCEDURE — 93005 ELECTROCARDIOGRAM TRACING: CPT

## 2019-03-02 PROCEDURE — 85025 COMPLETE CBC W/AUTO DIFF WBC: CPT | Performed by: PHYSICIAN ASSISTANT

## 2019-03-02 RX ORDER — DICYCLOMINE HCL 20 MG
20 TABLET ORAL 2 TIMES DAILY
Qty: 20 TABLET | Refills: 0 | Status: SHIPPED | OUTPATIENT
Start: 2019-03-02 | End: 2019-08-28

## 2019-03-02 NOTE — ED PROVIDER NOTES
History  Chief Complaint   Patient presents with    Weakness - Generalized     Patient sent in by pcp for weight loss, diarrhea since 1/9/19, and sob for 3 days  The patient is a 59-year-old male with a past medical history of anxiety presents emergency department for evaluation of exertional dyspnea and generalized weakness  Patient began having diarrhea approximately 2 months ago which has persisted until now  Patient was seen by his family doctor previously for this  It was originally thought to be due to antibiotics as patient was on a Z-Markel  Stool panel and C diff panel were done which were negative  Several days ago patient began experiencing shortness of breath when walking up the stairs  Patient has lost approximately 15 pounds since the diarrhea started  Patient was recently started on lorazepam for anxiety which has not relieved his symptoms  Patient complains of a lot of stress lately  Additionally, patient complains of feeling bloated, decreased appetite, and chest discomfort  The patient thinks he has irritable bowel syndrome  Patient denies palpitations, dizziness, headache, blurred vision, paresthesias, abdominal pain, nausea, vomiting, hematochezia, melena, night sweats  History provided by:  Patient and spouse   used: No    Malaise - 7 years or greater   Severity:  Moderate  Onset quality:  Gradual  Duration: Two months    Timing:  Constant  Progression:  Worsening  Chronicity:  Chronic  Context: stress    Context: not alcohol use, not change in medication and not decreased sleep    Ineffective treatments:  Drinking fluids, rest and sleep  Associated symptoms: anorexia, chest pain, diarrhea, lethargy and shortness of breath    Associated symptoms: no abdominal pain, no cough, no dizziness, no numbness in extremities, no falls, no fever, no headaches, no hematochezia, no loss of consciousness, no melena, no myalgias, no nausea, no sensory-motor deficit, no stroke symptoms, no syncope, no vision change and no vomiting        Prior to Admission Medications   Prescriptions Last Dose Informant Patient Reported? Taking? Flaxseed, Linseed, (FLAX SEED OIL) 1000 MG CAPS  Self Yes No   Sig: Take by mouth   LORazepam (ATIVAN) 0 5 mg tablet  Self Yes No   Sig: Take by mouth daily as needed for anxiety   Omega-3 Fatty Acids (FISH OIL) 1,000 mg  Self Yes No   Sig: Take 1,000 mg by mouth daily   Saw [place] 1000 MG CAPS  Self Yes No   Sig: Take 950 mg by mouth   Saw [place] 450 MG CAPS  Self Yes No   Sig: Take by mouth   cholecalciferol (VITAMIN D3) 1,000 units tablet  Self Yes No   Sig: Take 1,000 Units by mouth daily   fluticasone (FLONASE) 50 mcg/act nasal spray  Self Yes No   Sig: into each nostril   loratadine (CLARITIN) 10 mg tablet  Self Yes No   Sig: Take by mouth      Facility-Administered Medications: None       Past Medical History:   Diagnosis Date    Anxiety     History of arthritis     History of degenerative disc disease     Sexual dysfunction     Stomach problems        Past Surgical History:   Procedure Laterality Date    CHOLECYSTECTOMY      COLONOSCOPY         Family History   Problem Relation Age of Onset    Prostate cancer Father     Nephrolithiasis Brother      I have reviewed and agree with the history as documented  Social History     Tobacco Use    Smoking status: Former Smoker     Types: Cigars    Smokeless tobacco: Never Used   Substance Use Topics    Alcohol use: Yes     Alcohol/week: 2 4 oz     Types: 4 Cans of beer per week     Comment: socially    Drug use: No        Review of Systems   Constitutional: Positive for appetite change, fatigue and unexpected weight change  Negative for chills and fever  HENT: Negative for congestion, rhinorrhea and sore throat  Eyes: Negative for visual disturbance  Respiratory: Positive for shortness of breath  Negative for cough  Cardiovascular: Positive for chest pain   Negative for syncope  Gastrointestinal: Positive for anorexia and diarrhea  Negative for abdominal pain, blood in stool, hematochezia, melena, nausea and vomiting  Musculoskeletal: Negative for falls and myalgias  Neurological: Positive for weakness  Negative for dizziness, loss of consciousness, syncope, light-headedness, numbness and headaches  Psychiatric/Behavioral: Negative for confusion  The patient is nervous/anxious  All other systems reviewed and are negative  Physical Exam  Physical Exam   Constitutional: He is oriented to person, place, and time  He appears well-developed and well-nourished  No distress  HENT:   Head: Normocephalic and atraumatic  Right Ear: External ear normal    Left Ear: External ear normal    Mouth/Throat: Oropharynx is clear and moist    Eyes: Pupils are equal, round, and reactive to light  Conjunctivae are normal  Right eye exhibits no discharge  Left eye exhibits no discharge  No scleral icterus  Neck: Neck supple  Cardiovascular: Normal rate, regular rhythm and normal heart sounds  No murmur heard  Pulmonary/Chest: Effort normal and breath sounds normal  No stridor  No respiratory distress  He has no wheezes  He has no rales  Abdominal: Soft  Bowel sounds are normal  He exhibits no distension  There is no tenderness  Neurological: He is alert and oriented to person, place, and time  Skin: Skin is warm and dry  He is not diaphoretic  Nursing note and vitals reviewed        Vital Signs  ED Triage Vitals   Temperature Pulse Respirations Blood Pressure SpO2   03/02/19 1207 03/02/19 1205 03/02/19 1205 03/02/19 1210 03/02/19 1205   97 8 °F (36 6 °C) 84 17 125/79 98 %      Temp src Heart Rate Source Patient Position - Orthostatic VS BP Location FiO2 (%)   -- 03/02/19 1205 03/02/19 1210 03/02/19 1210 --    Monitor Sitting Right arm       Pain Score       --                  Vitals:    03/02/19 1205 03/02/19 1210 03/02/19 1215   BP:  125/79 125/79   Pulse: 84 Patient Position - Orthostatic VS:  Sitting        Visual Acuity      ED Medications  Medications - No data to display    Diagnostic Studies  Results Reviewed     Procedure Component Value Units Date/Time    TSH [[id number]]  (Normal) Collected:  03/02/19 1313    Lab Status:  Final result Specimen:  Blood from Arm, Right Updated:  03/02/19 1348     TSH 3RD GENERATON 2 106 uIU/mL     Narrative:       Patients undergoing fluorescein dye angiography may retain small amounts of fluorescein in the body for 48-72 hours post procedure  Samples containing fluorescein can produce falsely depressed TSH values  If the patient had this procedure,a specimen should be resubmitted post fluorescein clearance  Lipase [[id number]]  (Normal) Collected:  03/02/19 1313    Lab Status:  Final result Specimen:  Blood from Arm, Right Updated:  03/02/19 1348     Lipase 92 u/L     Troponin I [[id number]]  (Normal) Collected:  03/02/19 1313    Lab Status:  Final result Specimen:  Blood from Arm, Right Updated:  03/02/19 1341     Troponin I <0 02 ng/mL     Comprehensive metabolic panel [[id number]] Collected:  03/02/19 1313    Lab Status:  Final result Specimen:  Blood from Arm, Right Updated:  03/02/19 1339     Sodium 137 mmol/L      Potassium 4 0 mmol/L      Chloride 105 mmol/L      CO2 23 mmol/L      ANION GAP 9 mmol/L      BUN 13 mg/dL      Creatinine 0 96 mg/dL      Glucose 103 mg/dL      Calcium 8 9 mg/dL      AST 30 U/L      ALT 42 U/L      Alkaline Phosphatase 54 U/L      Total Protein 7 4 g/dL      Albumin 4 1 g/dL      Total Bilirubin 0 60 mg/dL      eGFR 81 ml/min/1 73sq m     Narrative:       National Kidney Disease Education Program recommendations are as follows:  GFR calculation is accurate only with a steady state creatinine  Chronic Kidney disease less than 60 ml/min/1 73 sq  meters  Kidney failure less than 15 ml/min/1 73 sq  meters      CBC and differential [[id number]] Collected:  03/02/19 1312    Lab Status:  Final result Specimen:  Blood from Arm, Right Updated:  03/02/19 1319     WBC 5 68 Thousand/uL      RBC 5 06 Million/uL      Hemoglobin 15 5 g/dL      Hematocrit 44 2 %      MCV 87 fL      MCH 30 6 pg      MCHC 35 1 g/dL      RDW 12 5 %      MPV 9 0 fL      Platelets 780 Thousands/uL      nRBC 0 /100 WBCs      Neutrophils Relative 74 %      Immat GRANS % 0 %      Lymphocytes Relative 19 %      Monocytes Relative 7 %      Eosinophils Relative 0 %      Basophils Relative 0 %      Neutrophils Absolute 4 15 Thousands/µL      Immature Grans Absolute 0 01 Thousand/uL      Lymphocytes Absolute 1 08 Thousands/µL      Monocytes Absolute 0 41 Thousand/µL      Eosinophils Absolute 0 01 Thousand/µL      Basophils Absolute 0 02 Thousands/µL                  XR chest 2 views   ED Interpretation by Ivon Forrester PA-C (03/02 1542)   No acute abnormality  Final Result by Christiano Rinaldi DO (03/02 6253)      No acute cardiopulmonary disease              Workstation performed: [id number]                    Procedures  ECG 12 Lead Documentation  Date/Time: 3/2/2019 3:04 PM  Performed by: Ivon Forrester PA-C  Authorized by: Ivon Forrester PA-C     Indications / Diagnosis:  Generalized weakness  ECG reviewed by me, the ED Provider: yes    Patient location:  ED  Previous ECG:     Previous ECG:  Compared to current    Comparison ECG info:  10/30/17    Similarity:  No change    Comparison to cardiac monitor: Yes    Interpretation:     Interpretation: normal    Rate:     ECG rate:  65    ECG rate assessment: normal    Rhythm:     Rhythm: sinus rhythm    Ectopy:     Ectopy: none    QRS:     QRS axis:  Normal  Conduction:     Conduction: normal    ST segments:     ST segments:  Normal  T waves:     T waves: flattening      Flattening:  AVL           Phone Contacts  ED Phone Contact    ED Course               MDM  Number of Diagnoses or Management Options  Diarrhea: established and worsening  Generalized weakness: new and requires workup  Diagnosis management comments: Patient is a 54-year-old male with past medical history of anxiety presenting emergency department for evaluation of exertional shortness of breath and diarrhea  Exam is unremarkable  Patient appears well  Will do cardiac workup given the exertional dyspnea including CBC, CMP, troponin, chest x-ray  We will additionally check TSH and lipase due to diarrhea and weight loss  Patient to be re-evaluated after results  Anticipate that patient will be discharged home  Labs unremarkable  Will prescribe Bentyl for symptom relief  Pt safe for discharge with PCP follow-up  Pt given GI contact information as pt is overdue for a colonoscopy  All questions were answered fully  Red flags and when to return to ED discussed at length with pt  Pt expressed understanding and is agreeable to plan  Amount and/or Complexity of Data Reviewed  Clinical lab tests: ordered and reviewed  Tests in the radiology section of CPT®: reviewed and ordered  Obtain history from someone other than the patient: yes  Review and summarize past medical records: yes  Independent visualization of images, tracings, or specimens: yes    Risk of Complications, Morbidity, and/or Mortality  Presenting problems: moderate  Diagnostic procedures: moderate  Management options: moderate    Patient Progress  Patient progress: stable      Disposition  Final diagnoses:   Diarrhea   Generalized weakness     Time reflects when diagnosis was documented in both MDM as applicable and the Disposition within this note     Time User Action Codes Description Comment    3/2/2019  2:17  [facility] [place] [R19 7] Diarrhea     3/2/2019  2:17 PM [address] [place], [place] [address] [R53 1] Generalized weakness       ED Disposition     ED Disposition Condition Date/Time Comment    Discharge Stable Sat Mar 2, 2019  2:17 PM Melia Guerrero discharge to home/self care              Follow-up Information     Follow up With Specialties Details Why Contact Info Additional Information    Wilfredo Coburn, DO Internal Medicine  Go to your scheduled appointment on Monday [address] [facility]  [address]  [facility] [address]  [facility] Emergency Department Emergency Medicine  If symptoms worsen [address] [address]  [phone] [facility] ED, [address], [place], [place], [address] [facility]   [address] [place] [id number] [facility] [place], [address] [place] [address] 0, [place], [place], [address]          Discharge Medication List as of 3/2/2019  2:19 PM      START taking these medications    Details   dicyclomine (BENTYL) 20 mg tablet Take 1 tablet (20 mg total) by mouth 2 (two) times a day, Starting Sat 3/2/2019, Normal         CONTINUE these medications which have NOT CHANGED    Details   cholecalciferol (VITAMIN D3) 1,000 units tablet Take 1,000 Units by mouth daily, Historical Med      Flaxseed, Linseed, (FLAX SEED OIL) 1000 MG CAPS Take by mouth, Historical Med      fluticasone (FLONASE) 50 mcg/act nasal spray into each nostril, Historical Med      loratadine (CLARITIN) 10 mg tablet Take by mouth, Historical Med      LORazepam (ATIVAN) 0 5 mg tablet Take by mouth daily as needed for anxiety, Historical Med      Omega-3 Fatty Acids (FISH OIL) 1,000 mg Take 1,000 mg by mouth daily, Historical Med      !! Saw [place] 1000 MG CAPS Take 950 mg by mouth, Historical Med      !! Saw [place] 450 MG CAPS Take by mouth, Historical Med       !! - Potential duplicate medications found  Please discuss with provider  No discharge procedures on file      ED Provider  Electronically Signed by           Fluor RUBEN Forbes  03/02/19 4220

## 2019-03-02 NOTE — ED NOTES
Pt ambulated to restroom with no difficulty, urine sample obtained        Vania Canada RN  03/02/19 6621

## 2019-04-15 ENCOUNTER — OFFICE VISIT (OUTPATIENT)
Dept: GASTROENTEROLOGY | Facility: CLINIC | Age: 69
End: 2019-04-15
Payer: MEDICARE

## 2019-04-15 VITALS
DIASTOLIC BLOOD PRESSURE: 77 MMHG | HEART RATE: 73 BPM | SYSTOLIC BLOOD PRESSURE: 113 MMHG | HEIGHT: 67 IN | WEIGHT: 144.2 LBS | TEMPERATURE: 97.1 F | BODY MASS INDEX: 22.63 KG/M2

## 2019-04-15 DIAGNOSIS — R19.7 DIARRHEA, UNSPECIFIED TYPE: ICD-10-CM

## 2019-04-15 DIAGNOSIS — R14.0 BLOATING: ICD-10-CM

## 2019-04-15 DIAGNOSIS — R63.4 WEIGHT LOSS, UNINTENTIONAL: Primary | ICD-10-CM

## 2019-04-15 DIAGNOSIS — Z80.0 FAMILY HISTORY OF PANCREATIC CANCER: ICD-10-CM

## 2019-04-15 PROCEDURE — 99204 OFFICE O/P NEW MOD 45 MIN: CPT | Performed by: INTERNAL MEDICINE

## 2019-04-15 RX ORDER — BUTALBITAL/ASPIRIN/CAFFEINE 50-325-40
CAPSULE ORAL
Refills: 3 | COMMUNITY
Start: 2019-01-31

## 2019-04-15 RX ORDER — SODIUM, POTASSIUM,MAG SULFATES 17.5-3.13G
1 SOLUTION, RECONSTITUTED, ORAL ORAL ONCE
Qty: 1 BOTTLE | Refills: 0 | Status: SHIPPED | OUTPATIENT
Start: 2019-04-15 | End: 2019-06-04

## 2019-04-15 RX ORDER — AZITHROMYCIN 250 MG/1
TABLET, FILM COATED ORAL
Refills: 0 | COMMUNITY
Start: 2019-02-11 | End: 2019-08-28

## 2019-06-03 ENCOUNTER — TELEPHONE (OUTPATIENT)
Dept: GASTROENTEROLOGY | Facility: MEDICAL CENTER | Age: 69
End: 2019-06-03

## 2019-06-03 ENCOUNTER — ANESTHESIA EVENT (OUTPATIENT)
Dept: GASTROENTEROLOGY | Facility: AMBULARY SURGERY CENTER | Age: 69
End: 2019-06-03

## 2019-06-04 ENCOUNTER — HOSPITAL ENCOUNTER (OUTPATIENT)
Dept: GASTROENTEROLOGY | Facility: AMBULARY SURGERY CENTER | Age: 69
Setting detail: OUTPATIENT SURGERY
Discharge: HOME | End: 2019-06-04
Attending: INTERNAL MEDICINE
Payer: MEDICARE

## 2019-06-04 ENCOUNTER — ANESTHESIA (OUTPATIENT)
Dept: GASTROENTEROLOGY | Facility: AMBULARY SURGERY CENTER | Age: 69
End: 2019-06-04

## 2019-06-04 VITALS
TEMPERATURE: 96.5 F | HEART RATE: 54 BPM | SYSTOLIC BLOOD PRESSURE: 110 MMHG | BODY MASS INDEX: 22.18 KG/M2 | HEIGHT: 66 IN | WEIGHT: 138 LBS | DIASTOLIC BLOOD PRESSURE: 68 MMHG | RESPIRATION RATE: 18 BRPM | OXYGEN SATURATION: 100 %

## 2019-06-04 DIAGNOSIS — R63.4 WEIGHT LOSS, UNINTENTIONAL: ICD-10-CM

## 2019-06-04 DIAGNOSIS — R19.7 DIARRHEA, UNSPECIFIED TYPE: ICD-10-CM

## 2019-06-04 DIAGNOSIS — R14.0 BLOATING: ICD-10-CM

## 2019-06-04 PROCEDURE — 88305 TISSUE EXAM BY PATHOLOGIST: CPT | Performed by: PATHOLOGY

## 2019-06-04 PROCEDURE — NC001 PR NO CHARGE: Performed by: INTERNAL MEDICINE

## 2019-06-04 PROCEDURE — 45380 COLONOSCOPY AND BIOPSY: CPT | Performed by: INTERNAL MEDICINE

## 2019-06-04 PROCEDURE — 43239 EGD BIOPSY SINGLE/MULTIPLE: CPT | Performed by: INTERNAL MEDICINE

## 2019-06-04 PROCEDURE — 1123F ACP DISCUSS/DSCN MKR DOCD: CPT | Performed by: INTERNAL MEDICINE

## 2019-06-04 RX ORDER — PROPOFOL 10 MG/ML
INJECTION, EMULSION INTRAVENOUS AS NEEDED
Status: DISCONTINUED | OUTPATIENT
Start: 2019-06-04 | End: 2019-06-04 | Stop reason: SURG

## 2019-06-04 RX ORDER — LIDOCAINE HYDROCHLORIDE 10 MG/ML
INJECTION, SOLUTION INFILTRATION; PERINEURAL AS NEEDED
Status: DISCONTINUED | OUTPATIENT
Start: 2019-06-04 | End: 2019-06-04 | Stop reason: SURG

## 2019-06-04 RX ORDER — SODIUM CHLORIDE, SODIUM LACTATE, POTASSIUM CHLORIDE, CALCIUM CHLORIDE 600; 310; 30; 20 MG/100ML; MG/100ML; MG/100ML; MG/100ML
75 INJECTION, SOLUTION INTRAVENOUS CONTINUOUS
Status: DISCONTINUED | OUTPATIENT
Start: 2019-06-04 | End: 2019-06-08 | Stop reason: HOSPADM

## 2019-06-04 RX ORDER — SODIUM CHLORIDE, SODIUM LACTATE, POTASSIUM CHLORIDE, CALCIUM CHLORIDE 600; 310; 30; 20 MG/100ML; MG/100ML; MG/100ML; MG/100ML
INJECTION, SOLUTION INTRAVENOUS CONTINUOUS PRN
Status: DISCONTINUED | OUTPATIENT
Start: 2019-06-04 | End: 2019-06-04 | Stop reason: SURG

## 2019-06-04 RX ORDER — GLYCOPYRROLATE 0.2 MG/ML
INJECTION INTRAMUSCULAR; INTRAVENOUS AS NEEDED
Status: DISCONTINUED | OUTPATIENT
Start: 2019-06-04 | End: 2019-06-04 | Stop reason: SURG

## 2019-06-04 RX ADMIN — PROPOFOL 50 MG: 10 INJECTION, EMULSION INTRAVENOUS at 08:41

## 2019-06-04 RX ADMIN — PROPOFOL 50 MG: 10 INJECTION, EMULSION INTRAVENOUS at 08:46

## 2019-06-04 RX ADMIN — SODIUM CHLORIDE, SODIUM LACTATE, POTASSIUM CHLORIDE, AND CALCIUM CHLORIDE: .6; .31; .03; .02 INJECTION, SOLUTION INTRAVENOUS at 08:18

## 2019-06-04 RX ADMIN — PROPOFOL 50 MG: 10 INJECTION, EMULSION INTRAVENOUS at 08:51

## 2019-06-04 RX ADMIN — GLYCOPYRROLATE 0.1 MG: 0.2 INJECTION, SOLUTION INTRAMUSCULAR; INTRAVENOUS at 08:20

## 2019-06-04 RX ADMIN — PROPOFOL 50 MG: 10 INJECTION, EMULSION INTRAVENOUS at 08:38

## 2019-06-04 RX ADMIN — PROPOFOL 200 MG: 10 INJECTION, EMULSION INTRAVENOUS at 08:34

## 2019-06-04 RX ADMIN — PROPOFOL 50 MG: 10 INJECTION, EMULSION INTRAVENOUS at 08:44

## 2019-06-04 RX ADMIN — LIDOCAINE HYDROCHLORIDE ANHYDROUS 100 MG: 10 INJECTION, SOLUTION INFILTRATION at 08:34

## 2019-06-05 DIAGNOSIS — F17.210 CIGARETTE SMOKER: ICD-10-CM

## 2019-06-05 DIAGNOSIS — R63.4 WEIGHT LOSS: Primary | ICD-10-CM

## 2019-08-28 ENCOUNTER — OFFICE VISIT (OUTPATIENT)
Dept: GASTROENTEROLOGY | Facility: CLINIC | Age: 69
End: 2019-08-28
Payer: MEDICARE

## 2019-08-28 VITALS
SYSTOLIC BLOOD PRESSURE: 134 MMHG | TEMPERATURE: 97.9 F | DIASTOLIC BLOOD PRESSURE: 76 MMHG | HEART RATE: 66 BPM | WEIGHT: 142.2 LBS | BODY MASS INDEX: 22.95 KG/M2

## 2019-08-28 DIAGNOSIS — K58.0 IRRITABLE BOWEL SYNDROME WITH DIARRHEA: Primary | ICD-10-CM

## 2019-08-28 PROCEDURE — 99214 OFFICE O/P EST MOD 30 MIN: CPT | Performed by: INTERNAL MEDICINE

## 2019-08-28 NOTE — PROGRESS NOTES
[facility] - Outpatient Consultation  Monica Diego 71 y o  male MRN: [id number]  Encounter: [id number]          ASSESSMENT AND PLAN:      70-year-old with history BPH, anxiety, IBS coming in for follow-up for diarrhea, bloating, significant weight loss  1  Significant weight loss   His weight is stable for the last 2  His diarrhea bloating have also resolved  All endoscopic workup blood work and stool labs have been negative or normal   No other alarm symptoms  Continue to monitor weight  2  IBS-D  Currently controlled on low FODMAP diet  Patient is working with a nutritionist to slowly reintroduce meals  Stress levels are low currently  His is per psychiatrist as prescribed Wellbutrin which she plans to take during the winter months to decrease anxiety should help with GI symptoms as well  Continue current management plan  3  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 on imaging  Fecal elastase was normal   No GI symptoms currently  Nothing further to do for now  Will note family history of pancreatic cancer  He does not drink alcohol or smoke cigarettes  4  Colon cancer screening    Colonoscopy June 2019 did not show any polyp  He does not have any family history of cancer  Would repeat colonoscopy 10 years permitted by patient's preference, functional, cognitive status at the time  Patient seen and staffed with attending, Dr Cory Larson MD  Gastroenterology Fellow  [address]  Date: August 28, 2019    ______________________________________________________________________    HPI:    70-year-old with history BPH, anxiety, IBS coming in for follow-up for diarrhea, bloating, significant weight loss  At last visit patient was reporting 4-5 loose bowel movements per day since 3-4 months along with bloating and a weight loss of about 15 lb    At that time CT scan and stool workup was negative the than mild atrophy of pancreatitis  Although patient has history longstanding IBS which worsens with stress due to significant weight loss and prior endoscopy more than 10 years ago it was decided pursue endoscopic workup as well as lab work  EGD in June 2019 showed mild gastritis biopsies negative for H pylori  Small bowel biopsies were negative for malabsorption disease  Colonoscopy in June 2019 showed sigmoid diverticulosis and small internal hemorrhoids  Random colon biopsies were negative infection or inflammation  Lab work done since March 2019 shows CBC and CMP are normal, celiac serology was negative, fecal elastase was negative, TSH was normal     Currently, patient reports that he has not lost any weight since the last 2 months  He has been having 1-2 normal consistency bowel movements and no bloating the last few months  No blood in stools, early satiety  Symptoms improved since he started low FODMAP diet now he is working with his nutritionist so too slowly reintroduce meals  He takes Bentyl only as needed and has not required for the last month  His stress levels are low as well which helps with his GI symptoms  In the summer he is able to do outdoor activities which decreases his stress levels while in winter he has to stay more indoors which increases anxiety then he has GI symptoms  Does not smoke cigarettes or drink alcohol  REVIEW OF SYSTEMS:    CONSTITUTIONAL: Denies any fever, chills, rigors, and weight loss  HEENT: No earache or tinnitus  Denies hearing loss or visual disturbances  CARDIOVASCULAR: No chest pain or palpitations  RESPIRATORY: Denies any cough, hemoptysis, shortness of breath or dyspnea on exertion  GASTROINTESTINAL: As noted in the History of Present Illness  GENITOURINARY: No problems with urination  Denies any hematuria or dysuria  NEUROLOGIC: No dizziness or vertigo, denies headaches  MUSCULOSKELETAL: Denies any muscle or joint pain     SKIN: Denies skin rashes or itching  ENDOCRINE: Denies excessive thirst  Denies intolerance to heat or cold  PSYCHOSOCIAL: Denies depression or anxiety  Denies any recent memory loss  Historical Information   Past Medical History:   Diagnosis Date    Anxiety     History of arthritis     History of degenerative disc disease     Sexual dysfunction     Stomach problems      Past Surgical History:   Procedure Laterality Date    CHOLECYSTECTOMY      COLONOSCOPY       Social History   Social History     Substance and Sexual Activity   Alcohol Use Yes    Alcohol/week: 4 0 standard drinks    Types: 4 Cans of beer per week    Comment: socially     Social History     Substance and Sexual Activity   Drug Use No     Social History     Tobacco Use   Smoking Status Former Smoker    Types: Cigars   Smokeless Tobacco Never Used     Family History   Problem Relation Age of Onset    Prostate cancer Father     Nephrolithiasis Brother     Diabetes Mother        Meds/Allergies       Current Outpatient Medications:     azithromycin (ZITHROMAX) 250 mg tablet    butalbital-acetaminophen-caffeine-codeine (FIORICET WITH CODEINE) -08-30 MG per capsule    cholecalciferol (VITAMIN D3) 1,000 units tablet    diclofenac sodium (VOLTAREN) 1 %    dicyclomine (BENTYL) 20 mg tablet    DOCOSAHEXAENOIC ACID PO    Flaxseed, Linseed, (FLAX SEED OIL) 1000 MG CAPS    fluticasone (FLONASE) 50 mcg/act nasal spray    loratadine (CLARITIN) 10 mg tablet    LORazepam (ATIVAN) 0 5 mg tablet    Nettle, Urtica Dioica, (NETTLE LEAF PO)    Omega-3 Fatty Acids (FISH OIL) 1,000 mg    Saw Palmetto 450 MG CAPS    Saw Palmetto, Serenoa repens, (SAW PALMETTO EXTRACT PO)    No Known Allergies        Objective     There were no vitals taken for this visit  There is no height or weight on file to calculate BMI          PHYSICAL EXAM:      General Appearance:   Alert, cooperative, no distress   HEENT:   Normocephalic, atraumatic, anicteric      Neck:  Supple, symmetrical, trachea midline   Lungs:   Clear to auscultation bilaterally; no rales, rhonchi or wheezing; respirations unlabored    Heart[de-identified]   Regular rate and rhythm; no murmur, rub, or gallop  Abdomen:   Soft, non-tender, non-distended; normal bowel sounds; no masses, no organomegaly    Genitalia:   Deferred    Rectal:   Deferred    Extremities:  No cyanosis, clubbing or edema    Pulses:  2+ and symmetric    Skin:  No jaundice, rashes, or lesions    Lymph nodes:  No palpable cervical lymphadenopathy        Lab Results:   No visits with results within 1 Day(s) from this visit  Latest known visit with results is:   Hospital Outpatient Visit on 06/04/2019   Component Date Value    Case Report 06/04/2019                      Value:Surgical Pathology Report                         Case: [id number]                                   Authorizing Provider:  Brigid Murray DO        Collected:           06/04/2019 [id number]              Ordering Location:     [facility]        Received:            06/04/2019 [address] Endoscopy                                                           Pathologist:           Carla Hernandez MD                                                         Specimens:   A) - Duodenum, Cold Bx Duodenum                                                                     B) - Stomach, Cold Bx Gastric body                                                                  C) - Colon, Random Cold Bx Colon                                                           Final Diagnosis 06/04/2019                      Value: This result contains rich text formatting which cannot be displayed here   Additional Information 06/04/2019                      Value: This result contains rich text formatting which cannot be displayed here  [place] [place] [facility] Description 06/04/2019                      Value: This result contains rich text formatting which cannot be displayed here   Clinical Information 06/04/2019                      Value:R/O Celiac         Radiology Results:   No results found

## 2019-09-06 ENCOUNTER — TELEPHONE (OUTPATIENT)
Dept: UROLOGY | Facility: MEDICAL CENTER | Age: 69
End: 2019-09-06

## 2019-09-06 NOTE — TELEPHONE ENCOUNTER
Patient of Dr Farhana Bocanegra seen in [place]  Wanted to alert  to urine test done by PCP  Results are in Epic as per Patient  He can be reached  At [phone]    Thank you

## 2019-09-06 NOTE — TELEPHONE ENCOUNTER
I called patient and informed him that we could not find any results in Epic    He will have his PCP fax to the nurse's fax

## 2020-12-22 ENCOUNTER — TELEPHONE (OUTPATIENT)
Dept: DERMATOLOGY | Facility: CLINIC | Age: 70
End: 2020-12-22

## 2021-03-11 ENCOUNTER — OFFICE VISIT (OUTPATIENT)
Dept: DERMATOLOGY | Facility: CLINIC | Age: 71
End: 2021-03-11
Payer: MEDICARE

## 2021-03-11 VITALS — TEMPERATURE: 98.4 F | WEIGHT: 157 LBS | BODY MASS INDEX: 25.23 KG/M2 | HEIGHT: 66 IN

## 2021-03-11 DIAGNOSIS — L57.0 KERATOSIS, ACTINIC: ICD-10-CM

## 2021-03-11 DIAGNOSIS — L81.4 LENTIGO: ICD-10-CM

## 2021-03-11 DIAGNOSIS — D22.9 MULTIPLE BENIGN MELANOCYTIC NEVI: ICD-10-CM

## 2021-03-11 DIAGNOSIS — D18.01 CHERRY ANGIOMA: Primary | ICD-10-CM

## 2021-03-11 DIAGNOSIS — L30.0 NUMMULAR ECZEMA: ICD-10-CM

## 2021-03-11 DIAGNOSIS — D48.5 NEOPLASM OF UNCERTAIN BEHAVIOR OF SKIN: ICD-10-CM

## 2021-03-11 DIAGNOSIS — L82.1 SEBORRHEIC KERATOSIS: ICD-10-CM

## 2021-03-11 PROCEDURE — 99204 OFFICE O/P NEW MOD 45 MIN: CPT | Performed by: DERMATOLOGY

## 2021-03-11 PROCEDURE — 88305 TISSUE EXAM BY PATHOLOGIST: CPT | Performed by: PATHOLOGY

## 2021-03-11 PROCEDURE — 17000 DESTRUCT PREMALG LESION: CPT | Performed by: DERMATOLOGY

## 2021-03-11 PROCEDURE — 11102 TANGNTL BX SKIN SINGLE LES: CPT | Performed by: DERMATOLOGY

## 2021-03-11 RX ORDER — ESCITALOPRAM OXALATE 10 MG/1
10 TABLET ORAL DAILY
COMMUNITY
Start: 2021-01-23

## 2021-03-11 NOTE — PATIENT INSTRUCTIONS
1  CHERRY ANGIOMAS     Assessment and Plan:  Based on a thorough discussion of this condition and the management approach to it (including a comprehensive discussion of the known risks, side effects and potential benefits of treatment), the patient (family) agrees to implement the following specific plan:  · Reassure benign        2  SEBORRHEIC KERATOSIS; NON-INFLAMED     Assessment and Plan:  Based on a thorough discussion of this condition and the management approach to it (including a comprehensive discussion of the known risks, side effects and potential benefits of treatment), the patient (family) agrees to implement the following specific plan:  · Reassure benign  · Use sun protection  Apply SPF 30 or higher at least three times a day  Wear sun protecting clothing and hats  3  SOLAR LENTIGINES         Assessment and Plan:  Based on a thorough discussion of this condition and the management approach to it (including a comprehensive discussion of the known risks, side effects and potential benefits of treatment), the patient (family) agrees to implement the following specific plan:  · Reassure benign  · Use sun protection  Apply SPF 30 or higher at least three times a day  Wear sun protecting clothing and hats  4  MULTIPLE MELANOCYTIC NEVI ("Moles")     Assessment and Plan:  Based on a thorough discussion of this condition and the management approach to it (including a comprehensive discussion of the known risks, side effects and potential benefits of treatment), the patient (family) agrees to implement the following specific plan:  · Reassure benign  · Monitor for changes  · Use sun protection  Apply SPF 30 or higher at least three times a day  Wear sun protecting clothing and hats  Worrisome signs of skin malignancy discussed, questions answered  Regular self-skin check discussed   Advised to call or return to office if patient notices any spots of concern, rapidly growing/changing lesions, bleeding lesions, non-healing lesions  Advised regular SPF use  5  NEOPLASM OF UNCERTAIN BEHAVIOR OF SKIN      Assessment and Plan:   I have discussed with the patient that a sample of skin via a "skin biopsy would be potentially helpful to further make a specific diagnosis under the microscope   Based on a thorough discussion of this condition and the management approach to it (including a comprehensive discussion of the known risks, side effects and potential benefits of treatment), the patient (family) agrees to implement the following specific plan:    o Procedure:  Skin Biopsy  After a thorough discussion of treatment options and risk/benefits/alternatives (including but not limited to local pain, scarring, dyspigmentation, blistering, possible superinfection, and inability to confirm a diagnosis via histopathology), verbal and written consent were obtained and portion of the rash was biopsied for tissue sample  See below for consent that was obtained from patient and subsequent Procedure Note  INFORMED CONSENT DISCUSSION AND POST-OPERATIVE INSTRUCTIONS FOR PATIENT    I   RATIONALE FOR PROCEDURE  I understand that a skin biopsy allows the Dermatologist to test a lesion or rash under the microscope to obtain a diagnosis  It usually involves numbing the area with numbing medication and removing a small piece of skin; sometimes the area will be closed with sutures  In this specific procedure, sutures are not usually needed  If any sutures are placed, then they are usually need to be removed in 2 weeks or less  I understand that my Dermatologist recommends that a skin "shave" biopsy be performed today  A local anesthetic, similar to the kind that a dentist uses when filling a cavity, will be injected with a very small needle into the skin area to be sampled  The injected skin and tissue underneath "will go to sleep and become numb so no pain should be felt afterwards    An instrument shaped like a tiny "razor blade" (shave biopsy instrument) will be used to cut a small piece of tissue and skin from the area so that a sample of tissue can be taken and examined more closely under the microscope  A slight amount of bleeding will occur, but it will be stopped with direct pressure and a pressure bandage and any other appropriate methods  I understands that a scar will form where the wound was created  Surgical ointment will be applied to help protect the wound  Sutures are not usually needed  II   RISKS AND POTENTIAL COMPLICATIONS   I understand the risks and potential complications of a skin biopsy include but are not limited to the following:   Bleeding   Infection   Pain   Scar/keloid   Skin discoloration   Incomplete Removal   Recurrence   Nerve Damage/Numbness/Loss of Function   Allergic Reaction to Anesthesia   Biopsies are diagnostic procedures and based on findings additional treatment or evaluation may be required   Loss or destruction of specimen resulting in no additional findings    My Dermatologist has explained to me the nature of the condition, the nature of the procedure, and the benefits to be reasonably expected compared with alternative approaches  My Dermatologist has discussed the likelihood of major risks or complications of this procedure including the specific risks listed above, such as bleeding, infection, and scarring/keloid  I understand that a scar is expected after this procedure  I understand that my physician cannot predict if the scar will form a "keloid," which extends beyond the borders of the wound that is created  A keloid is a thick, painful, and bumpy scar  A keloid can be difficult to treat, as it does not always respond well to therapy, which includes injecting cortisone directly into the keloid every few weeks  While this usually reduces the pain and size of the scar, it does not eliminate it        I understand that photographs may be taken before and after the procedure  These will be maintained as part of the medical providers confidential records and may not be made available to me  I further authorize the medical provider to use the photographs for teaching purposes or to illustrate scientific papers, books, or lectures if in his/her judgment, medical research, education, or science may benefit from its use  I have had an opportunity to fully inquire about the risks and benefits of this procedure and its alternatives  I have been given ample time and opportunity to ask questions and to seek a second opinion if I wished to do so  I acknowledge that there have specifically been no guarantees as to the cosmetic results from the procedure  I am aware that with any procedure there is always the possibility of an unexpected complication  III  POST-PROCEDURAL CARE (WHAT YOU WILL NEED TO DO "AFTER THE BIOPSY" TO OPTIMIZE HEALING)     Keep the area clean and dry  Try NOT to remove the bandage or get it wet for the first 24 hours   Gently clean the area and apply surgical ointment (such as Vaseline petrolatum ointment, which is available "over the counter" and not a prescription) to the biopsy site for up to 2 weeks straight  This acts to protect the wound from the outside world   Sutures are not usually placed in this procedure  If any sutures were placed, return for suture removal as instructed (generally 1 week for the face, 2 weeks for the body)   Take Acetaminophen (Tylenol) for discomfort, if no contraindications  Ibuprofen or aspirin could make bleeding worse   Call our office immediately for signs of infection: fever, chills, increased redness, warmth, tenderness, discomfort/pain, or pus or foul smell coming from the wound  WHAT TO DO IF THERE IS ANY BLEEDING? If a small amount of bleeding is noticed, place a clean cloth over the area and apply firm pressure for ten minutes    Check the wound after 10 minutes of direct pressure  If bleeding persists, try one more time for an additional 10 minutes of direct pressure on the area  If the bleeding becomes heavier or does not stop after the second attempt, or if you have any other questions about this procedure, then please call your [facility] Dermatologist by calling [phone] (SKIN)  I hereby acknowledge that I have reviewed and verified the site with my Dermatologist and have requested and authorized my Dermatologist to proceed with the procedure  6  ACTINIC KERATOSIS    Assessment and Plan:  Based on a thorough discussion of this condition and the management approach to it (including a comprehensive discussion of the known risks, side effects and potential benefits of treatment), the patient (family) agrees to implement the following specific plan:     Cryotherapy treatment today     Actinic keratoses are very common on sites repeatedly exposed to the sun, especially the backs of the hands and the face, most often affecting the ears, nose, cheeks, upper lip, vermilion of the lower lip, temples, forehead and balding scalp  In severely chronically sun-damaged individuals, they may also be found on the upper trunk, upper and lower limbs, and dorsum of feet  We discussed the theoretical premalignant (pre-cancerous) nature and etiology of these growths  We discussed the prevailing notion that actinic keratoses are a reflection of abnormal skin cell development due to DNA damage by short wavelength UVB  They are more likely to appear if the immune function is poor, due to aging, recent sun exposure, predisposing disease or certain drugs  We discussed that the main concern is that actinic keratoses may predispose to squamous cell carcinoma   It is rare for a solitary actinic keratosis to evolve to squamous cell carcinoma (SCC), but the risk of SCC occurring at some stage in a patient with more than 10 actinic keratoses is thought to be about 10 to 15%  A tender, thickened, ulcerated or enlarging actinic keratosis is suspicious of SCC  Actinic keratoses may be prevented by strict sun protection  If already present, keratoses may improve with a very high sun protection factor (50+) broad-spectrum sunscreen applied at least daily to affected areas, year-round  We recommend that UPF-rated clothing and hats and sunglasses be worn whenever possible and that a sunscreen-moisturizer combination product such as Neutrogena Daily Defense be applied at least three times a day  We performed a thorough discussion of treatment options and specific risk/benefits/alternatives including but not limited to medical field treatment with medications such as the following:     Topical field area medications such as 5-fluorouracil or Aldara (specifically, the trouble with long-term compliance, blistering and local skin reaction versus the convenience of at-home therapy and that field therapy gets what is not yet seen)   Cryotherapy (specifically, local pain, scarring, dyspigmentation, blistering, possible superinfection, and treats only what we see versus directed treatment today)   Photodynamic therapy (specifically, local pain, scarring, dyspigmentation, blistering, possible superinfection, need to schedule for a later date, and time spent in the office versus field therapy that gets what is not yet seen)  Liquid nitrogen was applied for 10-12 seconds to the skin lesion and the expected blistering or scabbing reaction explained  Do not pick at the area  Patient reminded to expect hypopigmented scars from the procedure  Return if lesion fails to fully resolve      7  NUMMULAR ECZEMA    Assessment and Plan:  Based on a thorough discussion of this condition and the management approach to it (including a comprehensive discussion of the known risks, side effects and potential benefits of treatment), the patient (family) agrees to implement the following specific plan:   Daily Moisturizers     Numular eczema   Nummular dermatitis (or eczema) is also known as discoid eczema (or dermatitis)  It has two forms:   Exudative (wet) nummular dermatitis    Dry nummular dermatitis    Exudative nummular dermatitis  The exudative variant starts acutely and may persist for weeks, months and rarely years  Although it may arise at any age, most subjects are over 48 years  In children, it is often thought to be a type of atopic dermatitis, but in adults it doesn't appear to relate to atopy  It is more common in males than females  The initial plaque may appear at the site of trauma or infection  For example:   Thermal burn    Scabies infestation    Varicose vein surgery    Insect bite    Impetigo    Nummular dermatitis is sometimes due to drug allergy (e g  to interferon alpha, intravenous immunoglobulins, etanercept) or systematised contact allergy, especially to nickel, gold and mercury  The initial lesions are papules or vesicles, which form confluent plaques  The plaques may be crusted, weeping or blistered and are intensely itchy  Secondary infection with pustules, pain and spreading erythema is not uncommon  Older lesions may be dry and excoriated  Clusters of round or oval plaques may be localised to lower legs, the backs of the hands or other sites  Nummular dermatitis may also generalise to affect scalp, face, trunk and limbs  Generally the eruption is scattered, but sometimes the plaques are distributed symmetrically  In-between skin appears normal     Investigations  Skin swabs frequently culture abundant Staphylococcus aureus from exudative nummular dermatitis and sometimes from dry discoid dermatitis  Skin scrapings for microscopy and fungal culture may be necessary to rule out tinea corporis      Differential diagnosis  Discoid eczema is frequently confused with the following skin disorders:   Psoriasis (redder, scalier plaques, symmetrical distribution, typical psoriatic sites)    Tinea corporis (grouped lesions, scaly or pustular edge)    Contact dermatitis (irregular shaped and sized lesions, contact factors)    Lichen simplex (lichenified plaques, may co-exist with nummular dermatitis)    Stasis dermatitis (lower legs, circumferential, hyperpigmentation, lipodermatosclerosis)    Management  Management of nummular dermatitis may involve the following:     Skin emollients  Emollients include bath oils, soap substitutes and moisturizing creams  They can be applied to the dermatitis as frequently as required to relieve itching, scaling and dryness  Emollients should also be used on the unaffected skin to reduce dryness  It may be necessary to try several different products to find one that suits  Many people find one or more of the following helpful: glycerine and cetomacrogol cream, white soft paraffin/liquid paraffin mixed, fatty cream, wool fat lotions   Topical steroids   Topical steroids are anti-inflammatory creams or ointments available on prescription which may clear the dermatitis and reduce irritation  The stronger products are applied to the patches just once or twice daily for 2-4 weeks  They may be repeated from time to time depending on flare ups  Mild ones such as hydrocortisone are safe for daily use if necessary   Antibiotics   Antibiotics (most often flucloxacillin) are often prescribed if the rash is blistered, sticky or crusted  Sometimes nummular eczema clears completely on oral antibiotics, only to recur when they are discontinued   Oral antihistamines   Antihistamine pills may reduce the itching, and are particularly helpful at night-time  They do not clear the dermatitis  Non-sedating antihistamines appear less useful for nummular eczema than first-generation antihistamines taken at night to help sleep     Ultraviolet radiation (UV) treatment   Phototherapy several times weekly for 6-12 weeks can reduce the extent and severity of discoid eczema   Steroid injections  Intralesional steroids are sometimes injected into one or two particularly stubborn areas of discoid eczema  This treatment is unsuitable for multiple lesions   Oral steroids (very rarely)  Systemic steroids are reserved for severe and extensive cases of discoid eczema  They are usually prescribed for a few weeks while continuing steroid creams and emollients on residual dermatitis   Other oral treatments   Persistent and troublesome nummular eczema is occasionally treated with methotrexate, azathioprine or ciclosporin  These medicines have important risks and side effects and require careful monitoring by a specialist dermatologist  They may be more suitable in many cases than long-term steroids  Nummular eczema can usually be controlled with the above measures, although it has a tendency to recur when the treatment has been stopped  In most patients, nummular eczema eventually clears up completely

## 2021-03-11 NOTE — PROGRESS NOTES
[address] [facility] Note     Patient Name: Tres Reyes  Encounter Date: 03/11/2021     Have you been cared for by a [facility] Dermatologist in the last 3 years and, if so, which one? No    · Have you traveled outside of the [address] [place] [address] in the past 3 months or outside of the [facility] area in the last 2 weeks? No     May we call your Preferred Phone number to discuss your specific medical information? Yes     May we leave a detailed message that includes your specific medical information? Yes      Today's Chief Concerns:   Concern #1:  Full Body Exam    Concern #2:  Spot of concern on face    Past Medical History:  Have you personally ever had or currently have any of the following? · Skin cancer (such as Melanoma, Basal Cell Carcinoma, Squamous Cell Carcinoma? (If Yes, please provide more detail)- No  · Eczema: No  · Psoriasis: No  · HIV/AIDS: No  · Hepatitis B or C: No  · Tuberculosis: No  · Systemic Immunosuppression such as Diabetes, Biologic or Immunotherapy, Chemotherapy, Organ Transplantation, Bone Marrow Transplantation (If YES, please provide more detail): No  · Radiation Treatment (If YES, please provide more detail): No  · Any other major medical conditions/concerns? (If Yes, which types)- No    Social History:     What is/was your primary occupation? Retired     What are your hobbies/past-times? Baseball     Family History:  Have any of your "first degree relatives" (parent, brother, sister, or child) had any of the following       · Skin cancer such as Melanoma or Merkel Cell Carcinoma or Pancreatic Cancer? No  · Eczema, Asthma, Hay Fever or Seasonal Allergies: No  · Psoriasis or Psoriatic Arthritis: No  · Do any other medical conditions seem to run in your family? If Yes, what condition and which relatives?   No    Current Medications:         Current Outpatient Medications:     cholecalciferol (VITAMIN D3) 1,000 units tablet, Take 1,000 Units by mouth daily, Disp: , Rfl:     diclofenac sodium (VOLTAREN) 1 %, SHONDA EXT AA QID, Disp: , Rfl: 3    DOCOSAHEXAENOIC ACID PO, Take 1 g by mouth, Disp: , Rfl:     escitalopram (LEXAPRO) 10 mg tablet, Take 10 mg by mouth daily , Disp: , Rfl:     fluticasone (FLONASE) 50 mcg/act nasal spray, into each nostril, Disp: , Rfl:     loratadine (CLARITIN) 10 mg tablet, Take by mouth, Disp: , Rfl:     LORazepam (ATIVAN) 0 5 mg tablet, Take by mouth daily as needed for anxiety, Disp: , Rfl:     Nettle, Urtica Dioica, (NETTLE LEAF PO), Take 1 capsule by mouth, Disp: , Rfl:     Omega-3 Fatty Acids (FISH OIL) 1,000 mg, Take 1,000 mg by mouth daily, Disp: , Rfl:     Saw [place] 450 MG CAPS, Take by mouth, Disp: , Rfl:     butalbital-acetaminophen-caffeine-codeine (FIORICET WITH CODEINE) -32-30 MG per capsule, TAKE 1 CAPSULE BY MOUTH EVERY 6 HOURS AS NEEDED FOR HEADACHES, Disp: , Rfl: 3    Flaxseed, Linseed, (FLAX SEED OIL) 1000 MG CAPS, Take by mouth, Disp: , Rfl:     Saw Palmetto, Serenoa repens, (SAW PALMETTO EXTRACT PO), Take 450 mg by mouth, Disp: , Rfl:       Review of Systems:  Have you recently had or currently have any of the following? If YES, what are you doing for the problem? · Fever, chills or unintended weight loss: No  · Sudden loss or change in your vision: No  · Nausea, vomiting or blood in your stool: No  · Painful or swollen joints: YES,   · Wheezing or cough: No  · Changing mole or non-healing wound: YES,   · Nosebleeds: No  · Excessive sweating: No  · Easy or prolonged bleeding? No  · Over the last 2 weeks, how often have you been bothered by the following problems? · Taking little interest or pleasure in doing things: 1 - Not at All  · Feeling down, depressed, or hopeless: 1 - Not at All  · Rapid heartbeat with epinephrine:  No    · FEMALES ONLY:    · Are you pregnant or planning to become pregnant? N/A  · Are you currently or planning to be nursing or breast feeding?  N/A    · Any known allergies? · No Known Allergies      Physical Exam:     Was a chaperone (Derm Clinical Assistant) present throughout the entire Physical Exam? Yes     Did the Dermatology Team specifically  the patient on the importance of a Full Skin Exam to be sure that nothing is missed clinically?  Yes}  o Did the patient ultimately request or accept a Full Skin Exam?  Yes  o Did the patient specifically refuse to have the areas "under-the-bra" examined by the Dermatologist? No  o Did the patient specifically refuse to have the areas "under-the-underwear" examined by the Dermatologist? No    CONSTITUTIONAL:   Vitals:    03/11/21 0919   Temp: 98 4 °F (36 9 °C)   TempSrc: Temporal   Weight: 71 2 kg (157 lb)   Height: 5' 6" (1 676 m)           PSYCH: Normal mood and affect  EYES: Normal conjunctiva  ENT: Normal lips and oral mucosa  CARDIOVASCULAR: No edema  RESPIRATORY: Normal respirations  HEME/LYMPH/IMMUNO:  No regional lymphadenopathy except as noted below in "ASSESSMENT AND PLAN BY DIAGNOSIS"    SKIN:  FULL ORGAN SYSTEM EXAM  Hair, Scalp, Ears, Face Normal except as noted below in Assessment   Neck, Cervical Chain Nodes Normal except as noted below in Assessment   Right Arm/Hand/Fingers Normal except as noted below in Assessment   Left Arm/Hand/Fingers Normal except as noted below in Assessment   Chest/Breasts/Axillae Viewed areas Normal except as noted below in Assessment   Abdomen, Umbilicus Normal except as noted below in Assessment   Back/Spine Normal except as noted below in Assessment   Groin/Genitalia/Buttocks Normal except as noted below in Assessment   Right Leg, Foot, Toes Normal except as noted below in Assessment   Left Leg, Foot, Toes Normal except as noted below in Assessment        Assessment and Plan by Diagnosis:    History of Present Condition:     Duration:  How long has this been an issue for you?    o  1 year    Location Affected:  Where on the body is this affecting you?    o  Nose  Quality:  Is there any bleeding, pain, itch, burning/irritation, or redness associated with the skin lesion? o  Redness   Severity:  Describe any bleeding, pain, itch, burning/irritation, or redness on a scale of 1 to 10 (with 10 being the worst)  o  2   Timing:  Does this condition seem to be there pretty constantly or do you notice it more at specific times throughout the day?    o  Consent    Context:  Have you ever noticed that this condition seems to be associated with specific activities you do?    o  Denies   Modifying Factors:    o Anything that seems to make the condition worse?    -  Contact   o What have you tried to do to make the condition better? -  Denies   Associated Signs and Symptoms:  Does this skin lesion seem to be associated with any of the following:  o  SL AMB DERM SIGNS AND SYMPTOMS: Redness     1  CHERRY ANGIOMAS     Physical Exam:  · Anatomic Location Affected:  Trunk and extremites  · Morphological Description:  Scattered cherry red papules  · Denies pain, itch, bleeding  No treatments tried  Present for years  Present constantly; no modifying factors which make it worse or better  Assessment and Plan:  Based on a thorough discussion of this condition and the management approach to it (including a comprehensive discussion of the known risks, side effects and potential benefits of treatment), the patient (family) agrees to implement the following specific plan:  · Reassure benign        2  SEBORRHEIC KERATOSIS; NON-INFLAMED     Physical Exam:  · Anatomic Location Affected:  Trunk and extremities  · Morphological Description:  Waxy, smooth to warty textured, yellow to brownish-grey to dark brown to blackish, discrete, "stuck-on" appearing papules  · Present for years  Denies pain, itch, bleeding  Additional History of Present Condition:  Present constantly; no modifying factors which make it worse or better  No prior treatment         Assessment and Plan:  Based on a thorough discussion of this condition and the management approach to it (including a comprehensive discussion of the known risks, side effects and potential benefits of treatment), the patient (family) agrees to implement the following specific plan:  · Reassure benign  · Use sun protection  Apply SPF 30 or higher at least three times a day  Wear sun protecting clothing and hats  3  SOLAR LENTIGINES      Physical Exam:   Anatomic Location Affected:  Sun exposed areas of back, chest, arms, legs   Morphological Description:  Multiple scattered brown to tan evenly pigmented macules    Denies pain, itch, bleeding  No treatments tried  Present for months - years  Reports getting newer lesions with sun exposure  Assessment and Plan:  Based on a thorough discussion of this condition and the management approach to it (including a comprehensive discussion of the known risks, side effects and potential benefits of treatment), the patient (family) agrees to implement the following specific plan:  · Reassure benign  · Use sun protection  Apply SPF 30 or higher at least three times a day  Wear sun protecting clothing and hats  4  MULTIPLE MELANOCYTIC NEVI ("Moles")     Physical Exam:  · Anatomic Location Affected: Trunk and extremities  · Morphological Description:  Scattered, round to ovoid, symmetrical-appearing, even bordered, skin colored to dark brown macules/papules  · Denies pain, itch, bleeding  No treatments tried  Present for years  Present constantly; no modifying factors which make it worse or better  Denies actively changing or growing moles        Assessment and Plan:  Based on a thorough discussion of this condition and the management approach to it (including a comprehensive discussion of the known risks, side effects and potential benefits of treatment), the patient (family) agrees to implement the following specific plan:  · Reassure benign  · Monitor for changes  · Use sun protection  Apply SPF 30 or higher at least three times a day  Wear sun protecting clothing and hats  Worrisome signs of skin malignancy discussed, questions answered  Regular self-skin check discussed  Advised to call or return to office if patient notices any spots of concern, rapidly growing/changing lesions, bleeding lesions, non-healing lesions  Advised regular SPF use  5  NEOPLASM OF UNCERTAIN BEHAVIOR OF SKIN    Physical Exam:   (Anatomic Location); (Size and Morphological Description); (Differential Diagnosis):  o Left upper bride; 4 mm x 3 mm; brown keratotic; differential benign keratosis vs actinic keratosis doubt squamous cell   Pertinent Positives:   Pertinent Negatives: Additional History of Present Condition:  Located on left bridge of nose  Presents for 1 year  No treatment attempted  Assessment and Plan:   I have discussed with the patient that a sample of skin via a "skin biopsy would be potentially helpful to further make a specific diagnosis under the microscope   Based on a thorough discussion of this condition and the management approach to it (including a comprehensive discussion of the known risks, side effects and potential benefits of treatment), the patient (family) agrees to implement the following specific plan:    o Procedure:  Skin Biopsy  After a thorough discussion of treatment options and risk/benefits/alternatives (including but not limited to local pain, scarring, dyspigmentation, blistering, possible superinfection, and inability to confirm a diagnosis via histopathology), verbal and written consent were obtained and portion of the rash was biopsied for tissue sample  See below for consent that was obtained from patient and subsequent Procedure Note      PROCEDURE SHAVE BIOPSY NOTE:     Performing Physician: Dr Sepulveda   Anatomic Location; Clinical Description with size (cm); Pre-Op Diagnosis:     Left upper bride; 4 mm x 3 mm; brown keratotic; differential benign keratosis vs actinic   keratosis doubt squamous cell   Post-op diagnosis: Same      Local anesthesia: 1% xylocaine with epi       Topical anesthesia: None     Hemostasis: Aluminum chloride       After obtaining informed consent  at which time there was a discussion about the purpose of biopsy  and low risks of infection and bleeding  The area was prepped and draped in the usual fashion  Anesthesia was obtained with 1% lidocaine with epinephrine  A shave biopsy to an appropriate sampling depth was obtained with a sterile blade (such as a 15-blade or DermaBlade)  The resulting wound was covered with surgical ointment and bandaged appropriately  The patient tolerated the procedure well without complications and was without signs of functional compromise  Specimen has been sent for review by Dermatopathology  Standard post-procedure care has been explained and has been included in written form within the patient's copy of Informed Consent  INFORMED CONSENT DISCUSSION AND POST-OPERATIVE INSTRUCTIONS FOR PATIENT    I   RATIONALE FOR PROCEDURE  I understand that a skin biopsy allows the Dermatologist to test a lesion or rash under the microscope to obtain a diagnosis  It usually involves numbing the area with numbing medication and removing a small piece of skin; sometimes the area will be closed with sutures  In this specific procedure, sutures are not usually needed  If any sutures are placed, then they are usually need to be removed in 2 weeks or less  I understand that my Dermatologist recommends that a skin "shave" biopsy be performed today  A local anesthetic, similar to the kind that a dentist uses when filling a cavity, will be injected with a very small needle into the skin area to be sampled  The injected skin and tissue underneath "will go to sleep and become numb so no pain should be felt afterwards    An instrument shaped like a tiny "razor blade" (shave biopsy instrument) will be used to cut a small piece of tissue and skin from the area so that a sample of tissue can be taken and examined more closely under the microscope  A slight amount of bleeding will occur, but it will be stopped with direct pressure and a pressure bandage and any other appropriate methods  I understands that a scar will form where the wound was created  Surgical ointment will be applied to help protect the wound  Sutures are not usually needed  II   RISKS AND POTENTIAL COMPLICATIONS   I understand the risks and potential complications of a skin biopsy include but are not limited to the following:   Bleeding   Infection   Pain   Scar/keloid   Skin discoloration   Incomplete Removal   Recurrence   Nerve Damage/Numbness/Loss of Function   Allergic Reaction to Anesthesia   Biopsies are diagnostic procedures and based on findings additional treatment or evaluation may be required   Loss or destruction of specimen resulting in no additional findings    My Dermatologist has explained to me the nature of the condition, the nature of the procedure, and the benefits to be reasonably expected compared with alternative approaches  My Dermatologist has discussed the likelihood of major risks or complications of this procedure including the specific risks listed above, such as bleeding, infection, and scarring/keloid  I understand that a scar is expected after this procedure  I understand that my physician cannot predict if the scar will form a "keloid," which extends beyond the borders of the wound that is created  A keloid is a thick, painful, and bumpy scar  A keloid can be difficult to treat, as it does not always respond well to therapy, which includes injecting cortisone directly into the keloid every few weeks  While this usually reduces the pain and size of the scar, it does not eliminate it  I understand that photographs may be taken before and after the procedure    These will be maintained as part of the medical providers confidential records and may not be made available to me  I further authorize the medical provider to use the photographs for teaching purposes or to illustrate scientific papers, books, or lectures if in his/her judgment, medical research, education, or science may benefit from its use  I have had an opportunity to fully inquire about the risks and benefits of this procedure and its alternatives  I have been given ample time and opportunity to ask questions and to seek a second opinion if I wished to do so  I acknowledge that there have specifically been no guarantees as to the cosmetic results from the procedure  I am aware that with any procedure there is always the possibility of an unexpected complication  III  POST-PROCEDURAL CARE (WHAT YOU WILL NEED TO DO "AFTER THE BIOPSY" TO OPTIMIZE HEALING)     Keep the area clean and dry  Try NOT to remove the bandage or get it wet for the first 24 hours   Gently clean the area and apply surgical ointment (such as Vaseline petrolatum ointment, which is available "over the counter" and not a prescription) to the biopsy site for up to 2 weeks straight  This acts to protect the wound from the outside world   Sutures are not usually placed in this procedure  If any sutures were placed, return for suture removal as instructed (generally 1 week for the face, 2 weeks for the body)   Take Acetaminophen (Tylenol) for discomfort, if no contraindications  Ibuprofen or aspirin could make bleeding worse   Call our office immediately for signs of infection: fever, chills, increased redness, warmth, tenderness, discomfort/pain, or pus or foul smell coming from the wound  WHAT TO DO IF THERE IS ANY BLEEDING? If a small amount of bleeding is noticed, place a clean cloth over the area and apply firm pressure for ten minutes  Check the wound after 10 minutes of direct pressure    If bleeding persists, try one more time for an additional 10 minutes of direct pressure on the area  If the bleeding becomes heavier or does not stop after the second attempt, or if you have any other questions about this procedure, then please call your [facility] Dermatologist by calling [phone] (SKIN)  I hereby acknowledge that I have reviewed and verified the site with my Dermatologist and have requested and authorized my Dermatologist to proceed with the procedure  6  ACTINIC KERATOSIS    Physical Exam:   Anatomic Location Affected:  Left upper cheek    Morphological Description:  Rough scaly macule     Additional History of Present Condition:  n/a    Assessment and Plan:  Based on a thorough discussion of this condition and the management approach to it (including a comprehensive discussion of the known risks, side effects and potential benefits of treatment), the patient (family) agrees to implement the following specific plan:     Cryotherapy treatment today     Actinic keratoses are very common on sites repeatedly exposed to the sun, especially the backs of the hands and the face, most often affecting the ears, nose, cheeks, upper lip, vermilion of the lower lip, temples, forehead and balding scalp  In severely chronically sun-damaged individuals, they may also be found on the upper trunk, upper and lower limbs, and dorsum of feet  We discussed the theoretical premalignant (pre-cancerous) nature and etiology of these growths  We discussed the prevailing notion that actinic keratoses are a reflection of abnormal skin cell development due to DNA damage by short wavelength UVB  They are more likely to appear if the immune function is poor, due to aging, recent sun exposure, predisposing disease or certain drugs  We discussed that the main concern is that actinic keratoses may predispose to squamous cell carcinoma   It is rare for a solitary actinic keratosis to evolve to squamous cell carcinoma (SCC), but the risk of SCC occurring at some stage in a patient with more than 10 actinic keratoses is thought to be about 10 to 15%  A tender, thickened, ulcerated or enlarging actinic keratosis is suspicious of SCC  Actinic keratoses may be prevented by strict sun protection  If already present, keratoses may improve with a very high sun protection factor (50+) broad-spectrum sunscreen applied at least daily to affected areas, year-round  We recommend that UPF-rated clothing and hats and sunglasses be worn whenever possible and that a sunscreen-moisturizer combination product such as Neutrogena Daily Defense be applied at least three times a day  We performed a thorough discussion of treatment options and specific risk/benefits/alternatives including but not limited to medical field treatment with medications such as the following:     Topical field area medications such as 5-fluorouracil or Aldara (specifically, the trouble with long-term compliance, blistering and local skin reaction versus the convenience of at-home therapy and that field therapy gets what is not yet seen)   Cryotherapy (specifically, local pain, scarring, dyspigmentation, blistering, possible superinfection, and treats only what we see versus directed treatment today)   Photodynamic therapy (specifically, local pain, scarring, dyspigmentation, blistering, possible superinfection, need to schedule for a later date, and time spent in the office versus field therapy that gets what is not yet seen)  PROCEDURE:  DESTRUCTION OF PRE-MALIGNANT LESIONS  After a thorough discussion of treatment options and risk/benefits/alternatives (including but not limited to local pain, scarring, dyspigmentation, blistering, and possible superinfection), verbal and written consent were obtained and the aforementioned lesions were treated on with cryotherapy using liquid nitrogen x 1 cycle for 5-10 seconds       TOTAL NUMBER of 1 pre-malignant lesions were treated today on the ANATOMIC LOCATION: left upper cheek  The patient tolerated the procedure well, and after-care instructions were provided  7  NUMMULAR ECZEMA    Physical Exam:   Anatomic Location Affected:  Bilateral Lower Legs   Morphological Description:  Well defined patches    Pertinent Positives:  N/A   Pertinent Negatives: N/A    Additional History of Present Condition:  N/A    Assessment and Plan:  Based on a thorough discussion of this condition and the management approach to it (including a comprehensive discussion of the known risks, side effects and potential benefits of treatment), the patient (family) agrees to implement the following specific plan:   Daily Moisturizers     Numular eczema   Nummular dermatitis (or eczema) is also known as discoid eczema (or dermatitis)  It has two forms:   Exudative (wet) nummular dermatitis    Dry nummular dermatitis    Exudative nummular dermatitis  The exudative variant starts acutely and may persist for weeks, months and rarely years  Although it may arise at any age, most subjects are over 48 years  In children, it is often thought to be a type of atopic dermatitis, but in adults it doesn't appear to relate to atopy  It is more common in males than females  The initial plaque may appear at the site of trauma or infection  For example:   Thermal burn    Scabies infestation    Varicose vein surgery    Insect bite    Impetigo    Nummular dermatitis is sometimes due to drug allergy (e g  to interferon alpha, intravenous immunoglobulins, etanercept) or systematised contact allergy, especially to nickel, gold and mercury  The initial lesions are papules or vesicles, which form confluent plaques  The plaques may be crusted, weeping or blistered and are intensely itchy  Secondary infection with pustules, pain and spreading erythema is not uncommon  Older lesions may be dry and excoriated    Clusters of round or oval plaques may be localised to lower legs, the backs of the hands or other sites  Nummular dermatitis may also generalise to affect scalp, face, trunk and limbs  Generally the eruption is scattered, but sometimes the plaques are distributed symmetrically  In-between skin appears normal     Investigations  Skin swabs frequently culture abundant Staphylococcus aureus from exudative nummular dermatitis and sometimes from dry discoid dermatitis  Skin scrapings for microscopy and fungal culture may be necessary to rule out tinea corporis  Differential diagnosis  Discoid eczema is frequently confused with the following skin disorders:   Psoriasis (redder, scalier plaques, symmetrical distribution, typical psoriatic sites)    Tinea corporis (grouped lesions, scaly or pustular edge)    Contact dermatitis (irregular shaped and sized lesions, contact factors)    Lichen simplex (lichenified plaques, may co-exist with nummular dermatitis)    Stasis dermatitis (lower legs, circumferential, hyperpigmentation, lipodermatosclerosis)    Management  Management of nummular dermatitis may involve the following:     Skin emollients  Emollients include bath oils, soap substitutes and moisturizing creams  They can be applied to the dermatitis as frequently as required to relieve itching, scaling and dryness  Emollients should also be used on the unaffected skin to reduce dryness  It may be necessary to try several different products to find one that suits  Many people find one or more of the following helpful: glycerine and cetomacrogol cream, white soft paraffin/liquid paraffin mixed, fatty cream, wool fat lotions   Topical steroids   Topical steroids are anti-inflammatory creams or ointments available on prescription which may clear the dermatitis and reduce irritation  The stronger products are applied to the patches just once or twice daily for 2-4 weeks  They may be repeated from time to time depending on flare ups   Mild ones such as hydrocortisone are safe for daily use if necessary   Antibiotics   Antibiotics (most often flucloxacillin) are often prescribed if the rash is blistered, sticky or crusted  Sometimes nummular eczema clears completely on oral antibiotics, only to recur when they are discontinued   Oral antihistamines   Antihistamine pills may reduce the itching, and are particularly helpful at night-time  They do not clear the dermatitis  Non-sedating antihistamines appear less useful for nummular eczema than first-generation antihistamines taken at night to help sleep   Ultraviolet radiation (UV) treatment   Phototherapy several times weekly for 6-12 weeks can reduce the extent and severity of discoid eczema   Steroid injections  Intralesional steroids are sometimes injected into one or two particularly stubborn areas of discoid eczema  This treatment is unsuitable for multiple lesions   Oral steroids (very rarely)  Systemic steroids are reserved for severe and extensive cases of discoid eczema  They are usually prescribed for a few weeks while continuing steroid creams and emollients on residual dermatitis   Other oral treatments   Persistent and troublesome nummular eczema is occasionally treated with methotrexate, azathioprine or ciclosporin  These medicines have important risks and side effects and require careful monitoring by a specialist dermatologist  They may be more suitable in many cases than long-term steroids  Nummular eczema can usually be controlled with the above measures, although it has a tendency to recur when the treatment has been stopped  In most patients, nummular eczema eventually clears up completely              Scribe Attestation    I,:  Rhonda Ng am acting as a scribe while in the presence of the attending physician :       I,:  Nico Foster MD personally performed the services described in this documentation    as scribed in my presence :

## 2021-03-15 NOTE — RESULT ENCOUNTER NOTE
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

## 2021-03-17 ENCOUNTER — TELEPHONE (OUTPATIENT)
Dept: DERMATOLOGY | Facility: CLINIC | Age: 71
End: 2021-03-17

## 2021-03-19 ENCOUNTER — TELEPHONE (OUTPATIENT)
Dept: DERMATOLOGY | Facility: CLINIC | Age: 71
End: 2021-03-19

## 2021-03-19 NOTE — TELEPHONE ENCOUNTER
Pre- operative Mohs Telephone Scheduling Note    Do you have a pacemaker or defibrillator? no    Do you take antibiotics before skin or dental procedures? no  If yes, will likely require pre-operative antibiotics  Ask  the patient why they take the antibiotics (usually because of joint replacement)  Do you have a history of a joint replacements within the past 2 years? no   If yes, will likely require pre-operative antibiotics  Ask if orthopaedic surgeon has prescribed pre-operative antibiotics to take before procedures/dental work? Do you take any OTC medications that thin your blood (Aspirin, Aleve, Ibuprofen) or supplements that thin your blood (fish oil, garlic, vitamin E, Ginko Biloba)? As needed Advil    Do you take any prescribed medications that thin your blood (Coumadin, Plavix, Xarelto, Eliquis or another prescribed blood thinner)? no    Do you have an allergy to lidocaine or epinephrine? no    Do you have an allergy to shellfish? no    Do you smoke? yes: cigars  and no      If yes,  patient to try and stop 2 days before surgery and 7 days after the surgery  Minimizing smoking as much as possible during this time will improve healing and the cosmetic result after surgery  Date scheduled: 04/21/2021 with Dr Dalila Moreno of Care with other provider (Sean Reyes, ENT) required? no   IF YES, PLEASE FORWARD TO APPROPRIATE PERSONNEL TO HELP COORDINATE  Are there remaining tumors to be scheduled?  no

## 2021-04-15 ENCOUNTER — TELEPHONE (OUTPATIENT)
Dept: DERMATOLOGY | Facility: CLINIC | Age: 71
End: 2021-04-15

## 2021-04-15 NOTE — TELEPHONE ENCOUNTER
Returned patients call in regards to questions he had about the MOHS procedure on 04/21/2021  All questions answered

## 2021-04-21 ENCOUNTER — PROCEDURE VISIT (OUTPATIENT)
Dept: DERMATOLOGY | Facility: CLINIC | Age: 71
End: 2021-04-21
Payer: MEDICARE

## 2021-04-21 VITALS
RESPIRATION RATE: 16 BRPM | DIASTOLIC BLOOD PRESSURE: 68 MMHG | WEIGHT: 158.4 LBS | SYSTOLIC BLOOD PRESSURE: 126 MMHG | HEART RATE: 77 BPM | TEMPERATURE: 97.3 F | BODY MASS INDEX: 25.57 KG/M2

## 2021-04-21 DIAGNOSIS — D09.9 SQUAMOUS CELL CARCINOMA IN SITU (SCCIS): ICD-10-CM

## 2021-04-21 PROCEDURE — 17311 MOHS 1 STAGE H/N/HF/G: CPT | Performed by: DERMATOLOGY

## 2021-04-21 PROCEDURE — 12051 INTMD RPR FACE/MM 2.5 CM/<: CPT | Performed by: DERMATOLOGY

## 2021-04-21 NOTE — PROGRESS NOTES
MOHS Procedure Note    Patient: Melia Guerrero  : 1950  MRN: [id number]  Date: 2021    History of Present Illness: The patient is a 79 y o  male who presents with complaints of Squamous Cell Carcinoma in SITU of the Left Upper Bridge of the Nose  Past Medical History:   Diagnosis Date    Anxiety     History of arthritis     History of degenerative disc disease     Sexual dysfunction     Squamous cell skin cancer 2021    SCCIS- Left Upper bridge of nose   Stomach problems        Past Surgical History:   Procedure Laterality Date    CHOLECYSTECTOMY      COLONOSCOPY      MOHS SURGERY Left 2021    SCCIS- Left upper bridge of nose- Dr Deon Streeter            Current Outpatient Medications:     cholecalciferol (VITAMIN D3) 1,000 units tablet, Take 1,000 Units by mouth daily, Disp: , Rfl:     diclofenac sodium (VOLTAREN) 1 %, SHONDA EXT AA QID, Disp: , Rfl: 3    escitalopram (LEXAPRO) 10 mg tablet, Take 10 mg by mouth daily , Disp: , Rfl:     fluticasone (FLONASE) 50 mcg/act nasal spray, into each nostril, Disp: , Rfl:     loratadine (CLARITIN) 10 mg tablet, Take by mouth, Disp: , Rfl:     Nettle, Urtica Dioica, (NETTLE LEAF PO), Take 1 capsule by mouth, Disp: , Rfl:     Omega-3 Fatty Acids (FISH OIL) 1,000 mg, Take 1,000 mg by mouth daily, Disp: , Rfl:     Saw Yeso 450 MG CAPS, Take by mouth, Disp: , Rfl:     butalbital-acetaminophen-caffeine-codeine (FIORICET WITH CODEINE) -77-30 MG per capsule, TAKE 1 CAPSULE BY MOUTH EVERY 6 HOURS AS NEEDED FOR HEADACHES, Disp: , Rfl: 3    DOCOSAHEXAENOIC ACID PO, Take 1 g by mouth, Disp: , Rfl:     Flaxseed, Linseed, (FLAX SEED OIL) 1000 MG CAPS, Take by mouth, Disp: , Rfl:     LORazepam (ATIVAN) 0 5 mg tablet, Take by mouth daily as needed for anxiety, Disp: , Rfl:     Saw Palmetto, Serenoa repens, (SAW PALMETTO EXTRACT PO), Take 450 mg by mouth, Disp: , Rfl:     No Known Allergies    Physical Exam:   Vitals:    21 0947   BP: 126/68   Pulse: 77   Resp: 16   Temp: (!) 97 3 °F (36 3 °C)     General: Awake, Alert, Oriented x 3, Mood and affect appropriate  Respiratory: Respirations even and unlabored  Cardiovascular: Peripheral pulses intact; no edema  Musculoskeletal Exam: n/a    Assessment: 0 6 x 0 6 cm pink scar; Biopsy proven to be Squamous Cell Carcinoma in SITU of the Left Upper Bridge of the Nose  Plan: MOHS    MOHS Procedure Timeout      Most Recent Value   Timeout:  1000   Patient Identity Verified:  Yes   Correct Site Verified:  Yes   Correct Procedure Verified:  Yes          MOHS Diagnosis/Indication/Location/ID      Most Recent Value   Pathology Type  Squamous cell carcinoma   Anatomic Site  -- [Left Upper Bridge of Nose ]   Indications for MOHS  tumor location   MOHS ID  [id number]          MOHS Site/Accession/Pre-Post      Most Recent Value   Original Site Identified (as submitted by referring clinician)  Photo   Biopsy Accession/Specimen # (as submitted by referring clincian)  [id number]   Pre-MOHS Size Length (cm)  0 6   Pre-MOHS Size Width (cm)  0 6   Post-MOHS Size-Length (cm)  1   Post MOHS Size-Width (cm)  1   Repair Type  Intermediate layered closure, Simple repair   Suture Type  Ethilon   Ethilon Suture Size  6   Final repair length (cm):  2   Anesthetic Used  1% Lidocaine with epinephrine          MOHS Tumor Stage 1 Information      Most Recent Value   Tissue Sections (blocks)  2   Microscopic Exam Section 1:  Areas of full thickness epidermal keratinocyte atypia consistent with squamous cell carcinoma in situ were noted on frozen section analysis  [margins are clear]   Microscopic Exam Section 2:  No tumor identified in section  Tumor Clear After Stage I? Yes                Patient identified, procedure verified, site identified and verified  Time out completed   Surgical removal of the lesion discussed with the patient (risks and benefits, including possibility of scarring, infection, recurrence or potential for further treatment)  I have specifically identified the site with the patient  I have discussed the fact that the patient will have a scar after the procedure regardless of granulation or repair with sutures  I have discussed that the repair options can range from granulation in some cases to linear or curvilinear closures to larger flaps or grafts  There are sometimes flaps or grafts used that require multiples stages of surgery and will not be completed today, rather be completed over a series of appointments  I have discussed that occasionally due to location, size or depth of the lesion I may recommend consultation with and transfer of care for further removal or the reconstruction to another provider such as ophthalmology surgery, plastic surgery, ENT surgery, or surgical oncology  There are cases in which other testing such as imaging with MRI or CT scan or testing of lymph nodes is recommended because of the nature/depth/location of tumor seen during the removal  There is a risk of injury to nerves causing temporary or permanent numbness or the inability to move muscles full such as the inability to lift eyebrows  Questions answered and verbal and written consent was obtained  The tumor qualifies for Mohs based on AUC criteria  Dr Jan Gentile served as the surgeon and pathologist during the procedure  With the patient in the supine position and under adequate local anesthesia with 1% lidocaine with epinephrine 1:200,000, the defect was scrubbed with Hibiclens  Sterile drapes were placed from the sterile tray  Because of the location of the surgical defect, an intermediate closure was judged to give the best possible cosmetic and functional result  The edges of the defect were carefully debrided removing any dead or coagulated tissue  Widely undermined with layered closure     Hemostasis was obtained by pinpoint electrocoagulation    Careful planning of removal of redundant tissue at either end of the defect was drawn out so that the suture lines would fall in the optimal orientation with regard to the relaxed skin tension lines  These were then removed with a #15 blade scalpel  The wound was then approximated by a deep layer of buried vertical mattress sutures and the cutaneous margins were approximated and closed by superficial sutures as noted above  Estimated blood loss was less than 5 mL  The patient tolerated the procedure well  The wound was dressed with petrolatum, a non-stick pad, and a compression dressing  Kenia Washburn MD served as the surgeon and pathologist during the procedure  Postoperative care: Wound care discussed at length  I urged the patient to call us if any problems or question should arise  Complications: none  Post-op medications: none  Patient condition after procedure: stable  Discharge plans: Plan for suture removal  8days at next scheduled appointment  Postoperative care: No would care should be necessary prior to the next visit but I urged the patient to call us if any problems or question should arise prior to that time  If circ umstances should change, we will contact the patient to make other arrangements     Scribe Attestation    I,:  Korin Wu MA am acting as a scribe while in the presence of the attending physician :       I,:  Kenia Washburn MD personally performed the services described in this documentation    as scribed in my presence :

## 2021-04-21 NOTE — PATIENT INSTRUCTIONS
Mohs Microscopic Surgery After Care    WOUND CARE AFTER SURGERY:    1  Do NOT to remove the pressure bandage for 48 hours  Keep the area clean and dry while this bandage is on  2  After removing the bandage for the first time, gently clean the area with soap and water  If the bandage is difficult to remove, getting the bandage wet in the shower will sometimes help soften the adhesive and allow it to be removed more easily  3  You will now need to cleanse this area daily in the shower with gentle soap  There is no need to scrub the area  Apply plain Vaseline ointment (this is over the counter and not a prescription) to the site followed by a clean appropriately sized bandage to area  Non stick dressing and paper tape (or Hypafix) are recommended for sensitive skin but a bandaid is fine if it covers the area well  4  You will need to continue the above daily wound care until you return for suture removal in 8 days (generally 7 days for the face, 10-14 days off the face)      RESTRICTIONS:     For two DAYS:   - You will need to take it very easy as this time is highest risk for bleeding  Being a "couch potato" during these two days is generally recommended  - For surgeries on the face/neck/scalp: Avoid leaning down to pick things up off the floor as this brings blood up to your head  Instead, squat down to pick things up  For two WEEKS:   - No heavy lifting (anything greater than 10 pounds)   - You can start to do slow, gentle activities such as slow walking but nothing to increase your heart rate and blood pressure too much (such as cardiovascular exercise)  It is important to take it easy as there is still a risk for bleeding and a high risk popping of stitches open during this time  If we did surgery near the eyes (including the nose, forehead, front part of your scalp, cheeks): It is VERY common to get a large amount of swelling around your eyes (puffy eyes)   Although less frequent, this can be enough to swell your eyes shut and can also come along with bruising  This should not hurt and is very expected and normal  It is typically worst at ~ 3 days out from your surgery and dramatically better 1 week post-operatively  If we did surgery around your nose: No blowing your nose as this puts you at higher risk of popping stitches durign this time  Instead dab under your nose with a tissue or use a Q-tip inside your nose  MANAGING YOUR PAIN AFTER SURGERY     You can expect to have some pain after surgery  This is normal  The pain is typically worse the first two days after surgery, and quickly begins to get better  The best strategy for controlling your pain after surgery is around the clock pain control  You can take over the counter Acetaminophen (Tylenol) for discomfort, if no contraindications  If you are taking this at the maximum dose, you can alternate this with Motrin (ibuprofen or Advil) as well  Alternating these medications with each other allows you to maximize your pain control  In addition to Tylenol and Motrin, you can use heating pads or ice packs on your incisions to help reduce your pain  How will I alternate your regular strength over-the-counter pain medication? You will take a dose of pain medication every three hours   Start by taking 650 mg of Tylenol (2 pills of 325 mg)   3 hours later take 600 mg of Motrin (3 pills of 200 mg)   3 hours after taking the Motrin take 650 mg of Tylenol   3 hours after that take 600 mg of Motrin  See example - if your first dose of Tylenol is at 12:00 PM     12:00 PM  Tylenol 650 mg (2 pills of 325 mg)    3:00 PM  Motrin 600 mg (3 pills of 200 mg)    6:00 PM  Tylenol 650 mg (2 pills of 325 mg)    9:00 PM  Motrin 600 mg (3 pills of 200 mg)    Continue alternating every 3 hours      Important:   Do not take more than 4000mg of Tylenol or 3200mg of Motrin in a 24-hour period  What if I still have pain?    If you have pain that is not controlled with the over-the-counter pain medications (Tylenol and Motrin or Advil), don't hesitate to call our staff using the number provided  We will help make sure you are managing your pain in the best way possible, and if necessary, we can provide a prescription for additional pain medication  CALL OUR OFFICE IMMEDIATELY FOR ANY SIGNS OF INFECTION:    This includes fever, chills, increased redness, warmth, tenderness, severe discomfort/pain, or pus or foul smell coming from the wound  [facility] directly at [phone] (SKIN)    IF BLEEDING IS NOTICED:    Place a clean cloth over the area and apply firm pressure for thirty minutes  Check the wound ONLY after 30 minutes of direct pressure; do not cheat and sneak a peak, as that does not count  If bleeding persists after 30 minutes of legitimate direct pressure, then try one more round of direct pressure to the area  Should the bleeding become heavier or not stop after the second attempt, call [facility] directly at [phone] (SKIN)  Your call will get routed to the dermatology surgeon on call even after hours

## 2021-04-29 ENCOUNTER — OFFICE VISIT (OUTPATIENT)
Dept: DERMATOLOGY | Facility: CLINIC | Age: 71
End: 2021-04-29

## 2021-04-29 DIAGNOSIS — Z48.02 ENCOUNTER FOR REMOVAL OF SUTURES: Primary | ICD-10-CM

## 2021-04-29 PROCEDURE — 99024 POSTOP FOLLOW-UP VISIT: CPT | Performed by: DERMATOLOGY

## 2021-04-29 NOTE — PROGRESS NOTES
Suture removal    Date/Time: 4/29/2021 10:13 AM  Performed by: Mg Bennett RN  Authorized by: Austin Mckenzie MD   Universal Protocol:  Consent: Verbal consent obtained  Written consent not obtained  Risks and benefits: risks, benefits and alternatives were discussed  Consent given by: patient  Patient understanding: patient states understanding of the procedure being performed  Patient consent: the patient's understanding of the procedure matches consent given  Procedure consent: procedure consent matches procedure scheduled  Relevant documents: relevant documents present and verified  Test results: test results available and properly labeled  Site marked: the operative site was not marked  Radiology Images displayed and confirmed  If images not available, report reviewed: imaging studies not available  Patient identity confirmed: verbally with patient        Patient location:  Clinic  Location:     Laterality:  Left    Location:  Head/neck    Head/neck location: Left upper bridge of nose  Procedure details: Tools used:  Suture removal kit    Wound appearance:  No sign(s) of infection, good wound healing, clean and pink    Number of sutures removed:  6  Post-procedure details:     Post-procedure assessment: Vaseline applied  Patient tolerance of procedure: Tolerated well, no immediate complications  Comments:      Advised to follow-up in 3 to 6 months for full body exam  Patient placed on 3 month recall list with Dr Herb Keene

## 2021-07-01 ENCOUNTER — OFFICE VISIT (OUTPATIENT)
Dept: DERMATOLOGY | Facility: CLINIC | Age: 71
End: 2021-07-01
Payer: MEDICARE

## 2021-07-01 VITALS — TEMPERATURE: 97.8 F | HEIGHT: 66 IN | BODY MASS INDEX: 25.39 KG/M2 | WEIGHT: 158 LBS

## 2021-07-01 DIAGNOSIS — D48.5 NEOPLASM OF UNCERTAIN BEHAVIOR OF SKIN: ICD-10-CM

## 2021-07-01 DIAGNOSIS — D18.01 CHERRY ANGIOMA: ICD-10-CM

## 2021-07-01 DIAGNOSIS — Z85.89 HISTORY OF SQUAMOUS CELL CARCINOMA: Primary | ICD-10-CM

## 2021-07-01 DIAGNOSIS — L82.1 SEBORRHEIC KERATOSIS: ICD-10-CM

## 2021-07-01 PROCEDURE — 88305 TISSUE EXAM BY PATHOLOGIST: CPT | Performed by: PATHOLOGY

## 2021-07-01 PROCEDURE — 11102 TANGNTL BX SKIN SINGLE LES: CPT | Performed by: DERMATOLOGY

## 2021-07-01 PROCEDURE — 99213 OFFICE O/P EST LOW 20 MIN: CPT | Performed by: DERMATOLOGY

## 2021-07-01 RX ORDER — AMOXICILLIN 250 MG
CAPSULE ORAL
COMMUNITY

## 2021-07-01 NOTE — PROGRESS NOTES
[facility]atology [facility] Follow Up Note    Patient Name: Jorge Casanova  Encounter Date: 7/1/2021    Today's Chief Concerns:  Mac Concern #1:  Lesion on cheek   Concern #2:  Spot on Shoulder   Concern #3:  Spots on thigh    Current Medications:    Current Outpatient Medications:     butalbital-acetaminophen-caffeine-codeine (FIORICET WITH CODEINE) -23-30 MG per capsule, TAKE 1 CAPSULE BY MOUTH EVERY 6 HOURS AS NEEDED FOR HEADACHES, Disp: , Rfl: 3    cholecalciferol (VITAMIN D3) 1,000 units tablet, Take 1,000 Units by mouth daily, Disp: , Rfl:     diclofenac sodium (VOLTAREN) 1 %, SHONDA EXT AA QID, Disp: , Rfl: 3    DOCOSAHEXAENOIC ACID PO, Take 1 g by mouth, Disp: , Rfl:     escitalopram (LEXAPRO) 10 mg tablet, Take 10 mg by mouth daily , Disp: , Rfl:     Flaxseed, Linseed, (FLAX SEED OIL) 1000 MG CAPS, Take by mouth, Disp: , Rfl:     fluticasone (FLONASE) 50 mcg/act nasal spray, into each nostril, Disp: , Rfl:     loratadine (CLARITIN) 10 mg tablet, Take by mouth, Disp: , Rfl:     LORazepam (ATIVAN) 0 5 mg tablet, Take by mouth daily as needed for anxiety, Disp: , Rfl:     Nettle, Urtica Dioica, (NETTLE LEAF PO), Take 1 capsule by mouth, Disp: , Rfl:     Omega-3 Fatty Acids (FISH OIL) 1,000 mg, Take 1,000 mg by mouth daily, Disp: , Rfl:     Saw [place] 450 MG CAPS, Take by mouth, Disp: , Rfl:     Saw Palmetto, Serenoa repens, (SAW PALMETTO EXTRACT PO), Take 450 mg by mouth, Disp: , Rfl:     CONSTITUTIONAL:   Vitals:    07/01/21 0853   Temp: 97 8 °F (36 6 °C)   Weight: 71 7 kg (158 lb)   Height: 5' 6" (1 676 m)         Specific Alerts:    Have you been seen by a [facility] Dermatologist in the last 3 years? YES    Are you pregnant or planning to become pregnant? N/A    Are you currently or planning to be nursing or breast feeding? N/A    No Known Allergies    May we call your Preferred Phone number to discuss your specific medical information?  YES    May we leave a detailed message that includes your specific medical information? YES    Have you traveled outside of the [facility] in the past 3 months? No    Do you currently have a pacemaker or defibrillator? No    Do you have any artificial heart valves, joints, plates, screws, rods, stents, pins, etc? No   - If Yes, were any placed within the last 2 years? Do you require any medications prior to a surgical procedure? No   - If Yes, for which procedure? - If Yes, what medications to you require? Are you taking any medications that cause you to bleed more easily ("blood thinners") No    Have you ever experienced a rapid heartbeat with epinephrine? No    Have you ever been treated with "gold" (gold sodium thiomalate) therapy? No    [facility] can help with wrinkles, "laugh lines," facial volume loss, "double chin," "love handles," age spots, and more  Are you interested in learning today about some of the skin enhancement procedures that we offer? (If Yes, please provide more detail) No    Review of Systems:  Have you recently had or currently have any of the following?     · Fever or chills: No  · Night Sweats: No  · Headaches: No  · Weight Gain: No  · Weight Loss: No  · Blurry Vision: No  · Nausea: No  · Vomiting: No  · Diarrhea: No  · Blood in Stool: No  · Abdominal Pain: No  · Itchy Skin: No  · Painful Joints: No  · Swollen Joints: No  · Muscle Pain: No  · Irregular Mole: No  · Sun Burn: No  · Dry Skin: No  · Skin Color Changes: No  · Scar or Keloid: No  · Cold Sores/Fever Blisters: No  · Bacterial Infections/MRSA: No  · Anxiety: No  · Depression: No  · Suicidal or Homicidal Thoughts: No      PSYCH: Normal mood and affect  EYES: Normal conjunctiva  ENT: Normal lips and oral mucosa  CARDIOVASCULAR: No edema  RESPIRATORY: Normal respirations  HEME/LYMPH/IMMUNO:  No regional lymphadenopathy except as noted below in ASSESSMENT AND PLAN BY DIAGNOSIS    FULL ORGAN SYSTEM SKIN EXAM (SKIN)   Hair, Scalp, Ears, Face Normal except as noted below in Assessment   Right Shoulder Normal except as noted below in Assessment   Left Leg Normal except as noted below in Assessment       1  HISTORY OF SQUAMOUS CELL CARCINOMA     Physical Exam:   Anatomic Location Affected:  Left upper bridge of nose   Morphological Description of Scar:  Well healed scar   Suspected Recurrence: no   Regional adenopathy: no    Additional History of Present Condition:  Status post Mohs 4/21/2021    Assessment and Plan:  Based on a thorough discussion of this condition and the management approach to it (including a comprehensive discussion of the known risks, side effects and potential benefits of treatment), the patient (family) agrees to implement the following specific plan:   Apply SPF 30 or higher multiple times a day  Wear sun protecting clothing and hats   Monitor for changes   Follow with Dermatology for regular skin checks  How can SCC be prevented? The most important way to prevent SCC is to avoid sunburn  This is especially important in childhood and early life  Fair skinned individuals and those with a personal or family history of BCC should protect their skin from sun exposure daily, year-round and lifelong   Stay indoors or under the shade in the middle of the day    Wear covering clothing    Apply high protection factor SPF50+ broad-spectrum sunscreens generously to exposed skin if outdoors    Avoid indoor tanning (sun beds, solaria)      What is the outlook for SCC? Most SCC are cured by treatment  Cure is most likely if treatment is undertaken when the lesion is small  A small percent of SCC's can spread to lymph  nodes and long term monitoring is indicated  They are also at increased risk of other skin cancers, especially melanoma  Regular self-skin examinations and long-term annual skin checks by an experienced health professional are recommended        2  SEBORRHEIC KERATOSIS; NON-INFLAMED    Physical Exam:   Anatomic Location Affected:  Right cheek   Morphological Description:  Flat and raised, waxy, smooth to warty textured, yellow to brownish-grey to dark brown to blackish, discrete, "stuck-on" appearing papules  Additional History of Present Condition:  Patient reports new bumps on the skin  Denies itch, burn, pain, bleeding or ulceration  Present constantly; nothing seems to make it worse or better  No prior treatment  Assessment and Plan:  Based on a thorough discussion of this condition and the management approach to it (including a comprehensive discussion of the known risks, side effects and potential benefits of treatment), the patient (family) agrees to implement the following specific plan:   Reassure benign    Seborrheic Keratosis  A seborrheic keratosis is a harmless warty spot that appears during adult life as a common sign of skin aging  Seborrheic keratoses can arise on any area of skin, covered or uncovered, with the usual exception of the palms and soles  They do not arise from mucous membranes  Seborrheic keratoses can have highly variable appearance  Seborrheic keratoses are extremely common  It has been estimated that over 90% of adults over the age of 61 years have one or more of them  They occur in males and females of all races, typically beginning to erupt in the 35s or 45s  They are uncommon under the age of 21 years  The precise cause of seborrhoeic keratoses is not known  Seborrhoeic keratoses are considered degenerative in nature  As time goes by, seborrheic keratoses tend to become more numerous  Some people inherit a tendency to develop a very large number of them; some people may have hundreds of them  The name "seborrheic keratosis" is misleading, because these lesions are not limited to a seborrhoeic distribution (scalp, mid-face, chest, upper back), nor are they formed from sebaceous glands, nor are they associated with sebum -- which is greasy  Seborrheic keratosis may also be called "SK," "Seb K," "basal cell papilloma," "senile wart," or "barnacle "      Researchers have noted:   Eruptive seborrhoeic keratoses can follow sunburn or dermatitis   Skin friction may be the reason they appear in body folds   Viral cause (e g , human papillomavirus) seems unlikely   Stable and clonal mutations or activation of FRFR3, PIK3CA, ZARI, AKT1 and EGFR genes are found in seborrhoeic keratoses   Seborrhoeic keratosis can arise from solar lentigo   FRFR3 mutations also arise in solar lentigines  These mutations are associated with increased age and location on the head and neck, suggesting a role of ultraviolet radiation in these lesions   Seborrheic keratoses do not harbour tumour suppressor gene mutations   Epidermal growth factor receptor inhibitors, which are used to treat some cancers, often result in an increase in verrucal (warty) keratoses  There is no easy way to remove multiple lesions on a single occasion  Unless a specific lesion is "inflamed" and is causing pain or stinging/burning or is bleeding, most insurance companies do not authorize treatment  3  CHERRY ANGIOMAS    Physical Exam:   Anatomic Location Affected:  Trunk and extremities   Morphological Description:  Scattered cherry red, 1-4 mm papules  Assessment and Plan:  Based on a thorough discussion of this condition and the management approach to it (including a comprehensive discussion of the known risks, side effects and potential benefits of treatment), the patient (family) agrees to implement the following specific plan:   Reassure benign    Assessment and Plan:    Cherry angioma, also known as Lewanda Hoes spots, are benign vascular skin lesions  A "cherry angioma" is a firm red, blue or purple papule, 0 1-1 cm in diameter  When thrombosed, they can appear black in colour until evaluated with a dermatoscope when the red or purple colour is more easily seen   Nader Rodriguez angioma may develop on any part of the body but most often appear on the scalp, face, lips and trunk  An angioma is due to proliferating endothelial cells; these are the cells that line the inside of a blood vessel  Angiomas can arise in early life or later in life; the most common type of angioma is a cherry angioma  Cherry angiomas are very common in males and females of any age or race  They are more noticeable in white skin than in skin of colour  They markedly increase in number from about the age of 36  There may be a family history of similar lesions  Eruptive cherry angiomas have been rarely reported to be associated with internal malignancy  The cause of angiomas is unknown  Genetic analysis of cherry angiomas has shown that they frequently carry specific somatic missense mutations in the GNAQ and GNA11 (Q209H) genes, which are involved in other vascular and melanocytic proliferations  Cherry angioma is usually diagnosed clinically and no investigations are necessary for the majority of lesions  It has a characteristic red-clod or lobular pattern on dermatoscopy (called lacunar pattern using conventional pattern analysis)  When there is uncertainty about the diagnosis, a biopsy may be performed  The angioma is composed of venules in a thickened papillary dermis  Collagen bundles may be prominent between the lobules  Cherry angiomas are harmless, so they do not usually have to be treated  Occasionally, they are removed to exclude a malignant skin lesion such as a nodular melanoma or because they are irritated or bleeding (and a subsequent risk for infection)  To decrease friction over the lesions, we recommend Neutrogena Daily Defense SPF 50+ at least 3 times a day        4  NEOPLASM OF UNCERTAIN BEHAVIOR    Physical Exam:   Anatomic Location Affected:  Left upper cheek   Morphological Description:  Rough scaly macule    Additional History of Present Condition:  recurrent    Assessment and Plan:  Based on a thorough discussion of this condition and the management approach to it (including a comprehensive discussion of the known risks, side effects and potential benefits of treatment), the patient (family) agrees to implement the following specific plan:     Biopsy in office 7/1/2021   If actinic keratosis biopsy sufficient      PROCEDURE SHAVE BIOPSY NOTE:     Performing Physician: Dr Sepulveda   Anatomic Location; Clinical Description with size (cm); Pre-Op Diagnosis:   o Upper Left Cheek; 79year old male with rough scaly macule, recurrent actinic keratosis; Rule out Squamous Cell Carcinoma     Post-op diagnosis: Same      Local anesthesia: 1% xylocaine with epi       Topical anesthesia: None     Hemostasis: Aluminum chloride       After obtaining informed consent  at which time there was a discussion about the purpose of biopsy  and low risks of infection and bleeding  The area was prepped and draped in the usual fashion  Anesthesia was obtained with 1% lidocaine with epinephrine  A shave biopsy to an appropriate sampling depth was obtained with a sterile blade (such as a 15-blade or DermaBlade)  The resulting wound was covered with surgical ointment and bandaged appropriately  The patient tolerated the procedure well without complications and was without signs of functional compromise  Specimen has been sent for review by Dermatopathology  Standard post-procedure care has been explained and has been included in written form within the patient's copy of Informed Consent  INFORMED CONSENT DISCUSSION AND POST-OPERATIVE INSTRUCTIONS FOR PATIENT    I   RATIONALE FOR PROCEDURE  I understand that a skin biopsy allows the Dermatologist to test a lesion or rash under the microscope to obtain a diagnosis  It usually involves numbing the area with numbing medication and removing a small piece of skin; sometimes the area will be closed with sutures   In this specific procedure, sutures are not usually needed  If any sutures are placed, then they are usually need to be removed in 2 weeks or less  I understand that my Dermatologist recommends that a skin "shave" biopsy be performed today  A local anesthetic, similar to the kind that a dentist uses when filling a cavity, will be injected with a very small needle into the skin area to be sampled  The injected skin and tissue underneath "will go to sleep and become numb so no pain should be felt afterwards  An instrument shaped like a tiny "razor blade" (shave biopsy instrument) will be used to cut a small piece of tissue and skin from the area so that a sample of tissue can be taken and examined more closely under the microscope  A slight amount of bleeding will occur, but it will be stopped with direct pressure and a pressure bandage and any other appropriate methods  I understands that a scar will form where the wound was created  Surgical ointment will be applied to help protect the wound  Sutures are not usually needed  II   RISKS AND POTENTIAL COMPLICATIONS   I understand the risks and potential complications of a skin biopsy include but are not limited to the following:   Bleeding   Infection   Pain   Scar/keloid   Skin discoloration   Incomplete Removal   Recurrence   Nerve Damage/Numbness/Loss of Function   Allergic Reaction to Anesthesia   Biopsies are diagnostic procedures and based on findings additional treatment or evaluation may be required   Loss or destruction of specimen resulting in no additional findings    My Dermatologist has explained to me the nature of the condition, the nature of the procedure, and the benefits to be reasonably expected compared with alternative approaches  My Dermatologist has discussed the likelihood of major risks or complications of this procedure including the specific risks listed above, such as bleeding, infection, and scarring/keloid    I understand that a scar is expected after this procedure  I understand that my physician cannot predict if the scar will form a "keloid," which extends beyond the borders of the wound that is created  A keloid is a thick, painful, and bumpy scar  A keloid can be difficult to treat, as it does not always respond well to therapy, which includes injecting cortisone directly into the keloid every few weeks  While this usually reduces the pain and size of the scar, it does not eliminate it  I understand that photographs may be taken before and after the procedure  These will be maintained as part of the medical providers confidential records and may not be made available to me  I further authorize the medical provider to use the photographs for teaching purposes or to illustrate scientific papers, books, or lectures if in his/her judgment, medical research, education, or science may benefit from its use  I have had an opportunity to fully inquire about the risks and benefits of this procedure and its alternatives  I have been given ample time and opportunity to ask questions and to seek a second opinion if I wished to do so  I acknowledge that there have specifically been no guarantees as to the cosmetic results from the procedure  I am aware that with any procedure there is always the possibility of an unexpected complication  III  POST-PROCEDURAL CARE (WHAT YOU WILL NEED TO DO "AFTER THE BIOPSY" TO OPTIMIZE HEALING)     Keep the area clean and dry  Try NOT to remove the bandage or get it wet for the first 24 hours   Gently clean the area and apply surgical ointment (such as Vaseline petrolatum ointment, which is available "over the counter" and not a prescription) to the biopsy site for up to 2 weeks straight  This acts to protect the wound from the outside world   Sutures are not usually placed in this procedure    If any sutures were placed, return for suture removal as instructed (generally 1 week for the face, 2 weeks for the body)   Take Acetaminophen (Tylenol) for discomfort, if no contraindications  Ibuprofen or aspirin could make bleeding worse   Call our office immediately for signs of infection: fever, chills, increased redness, warmth, tenderness, discomfort/pain, or pus or foul smell coming from the wound  WHAT TO DO IF THERE IS ANY BLEEDING? If a small amount of bleeding is noticed, place a clean cloth over the area and apply firm pressure for ten minutes  Check the wound after 10 minutes of direct pressure  If bleeding persists, try one more time for an additional 10 minutes of direct pressure on the area  If the bleeding becomes heavier or does not stop after the second attempt, or if you have any other questions about this procedure, then please call your [facility] by calling [phone] (SKIN)  I hereby acknowledge that I have reviewed and verified the site with my Dermatologist and have requested and authorized my Dermatologist to proceed with the procedure

## 2021-07-08 NOTE — RESULT ENCOUNTER NOTE
DERMATOPATHOLOGY RESULT NOTE    Results reviewed by ordering physician  Called patient to personally discuss results  Discussed results with patient  Noted that should observe that spot resolves      Instructions for Clinical Derm Team:   (remember to route Result Note to appropriate staff):    None    Result & Plan by Specimen:    Specimen A: indeterminate (not malignant)  Plan: observe  Site now has been shave removed  Observe for recurrence      Final Diagnosis  A  Skin lesion, A: Upper left cheek, shave biopsy:  - Hypertrophic and proliferative actinic keratosis  - Negative for malignancy

## 2022-03-10 ENCOUNTER — APPOINTMENT (EMERGENCY)
Dept: CT IMAGING | Facility: HOSPITAL | Age: 72
End: 2022-03-10
Payer: MEDICARE

## 2022-03-10 ENCOUNTER — HOSPITAL ENCOUNTER (EMERGENCY)
Facility: HOSPITAL | Age: 72
Discharge: HOME | End: 2022-03-10
Attending: EMERGENCY MEDICINE
Payer: MEDICARE

## 2022-03-10 VITALS
SYSTOLIC BLOOD PRESSURE: 178 MMHG | TEMPERATURE: 97.9 F | RESPIRATION RATE: 18 BRPM | OXYGEN SATURATION: 100 % | HEART RATE: 72 BPM | DIASTOLIC BLOOD PRESSURE: 89 MMHG

## 2022-03-10 DIAGNOSIS — S01.81XA LACERATION OF FOREHEAD, INITIAL ENCOUNTER: ICD-10-CM

## 2022-03-10 DIAGNOSIS — S09.90XA CLOSED HEAD INJURY, INITIAL ENCOUNTER: Primary | ICD-10-CM

## 2022-03-10 PROCEDURE — 99282 EMERGENCY DEPT VISIT SF MDM: CPT | Performed by: EMERGENCY MEDICINE

## 2022-03-10 PROCEDURE — 99284 EMERGENCY DEPT VISIT MOD MDM: CPT

## 2022-03-10 PROCEDURE — G1004 CDSM NDSC: HCPCS

## 2022-03-10 PROCEDURE — G0168 WOUND CLOSURE BY ADHESIVE: HCPCS | Performed by: EMERGENCY MEDICINE

## 2022-03-10 PROCEDURE — 70450 CT HEAD/BRAIN W/O DYE: CPT

## 2022-03-10 RX ORDER — IBUPROFEN 400 MG/1
400 TABLET ORAL ONCE
Status: DISCONTINUED | OUTPATIENT
Start: 2022-03-10 | End: 2022-03-10 | Stop reason: HOSPADM

## 2022-03-11 NOTE — ED PROVIDER NOTES
History  Chief Complaint   Patient presents with   [facility]     pt fell down 2 steps and hit head on front and then fell back -loc        History provided by:  Patient  Fall  Mechanism of injury: fall    Injury location:  Head/neck  Head/neck injury location:  Head  Incident location:  Home  Time since incident:  1 hour  Fall:     Fall occurred:  Tripped, walking and down stairs    Impact surface:  Hard floor    Point of impact:  Head  Protective equipment: none    Suspicion of alcohol use: no    Suspicion of drug use: no    Prior to arrival data:     Bystander interventions:  None    Blood loss:  None    Responsiveness at scene:  Alert    Orientation at scene:  Person, place, time and situation    Loss of consciousness: no      Amnesic to event: no      Airway interventions:  None  Current pain details:     Pain quality:  Aching    Pain Severity:  Moderate    Pain timing:  Constant  Associated symptoms: headaches    Associated symptoms: no abdominal pain, no chest pain, no nausea, no neck pain and no vomiting    Associated symptoms comment:  Laceration to forehead      Prior to Admission Medications   Prescriptions Last Dose Informant Patient Reported? Taking?    Cobalamin Combinations (B-12) 100-5000 MCG SUBL   Yes No   Sig: Place under the tongue   DOCOSAHEXAENOIC ACID PO  Self Yes No   Sig: Take 1 g by mouth   Flaxseed, Linseed, (FLAX SEED OIL) 1000 MG CAPS  Self Yes No   Sig: Take by mouth   LORazepam (ATIVAN) 0 5 mg tablet  Self Yes No   Sig: Take by mouth daily as needed for anxiety   Nettle, Urtica Dioica, (NETTLE LEAF PO)  Self Yes No   Sig: Take 1 capsule by mouth   Omega-3 Fatty Acids (FISH OIL) 1,000 mg  Self Yes No   Sig: Take 1,000 mg by mouth daily   Saw [place] 450 MG CAPS  Self Yes No   Sig: Take by mouth   Saw Palmetto, Serenoa repens, (SAW PALMETTO EXTRACT PO)  Self Yes No   Sig: Take 450 mg by mouth   butalbital-acetaminophen-caffeine-codeine (FIORICET WITH CODEINE) -76-30 MG per capsule Self Yes No   Sig: TAKE 1 CAPSULE BY MOUTH EVERY 6 HOURS AS NEEDED FOR HEADACHES   cholecalciferol (VITAMIN D3) 1,000 units tablet  Self Yes No   Sig: Take 1,000 Units by mouth daily   diclofenac sodium (VOLTAREN) 1 %  Self Yes No   Sig: SHONDA EXT AA QID   escitalopram (LEXAPRO) 10 mg tablet   Yes No   Sig: Take 10 mg by mouth daily    fluticasone (FLONASE) 50 mcg/act nasal spray  Self Yes No   Sig: into each nostril   loratadine (CLARITIN) 10 mg tablet  Self Yes No   Sig: Take by mouth      Facility-Administered Medications: None       Past Medical History:   Diagnosis Date    Anxiety     History of arthritis     History of degenerative disc disease     Sexual dysfunction     Squamous cell skin cancer 04/21/2021    SCCIS- Left Upper bridge of nose   Stomach problems        Past Surgical History:   Procedure Laterality Date    CHOLECYSTECTOMY      COLONOSCOPY      MOHS SURGERY Left 04/21/2021    SCCIS- Left upper bridge of nose- Dr Christie Moyer  Family History   Problem Relation Age of Onset    Prostate cancer Father     Nephrolithiasis Brother     Diabetes Mother      I have reviewed and agree with the history as documented  E-Cigarette/Vaping    E-Cigarette Use Never User      E-Cigarette/Vaping Substances    Nicotine No     THC No     CBD No     Flavoring No     Other No     Unknown No      Social History     Tobacco Use    Smoking status: Former Smoker     Types: Cigars    Smokeless tobacco: Never Used   Vaping Use    Vaping Use: Never used   Substance Use Topics    Alcohol use: Yes     Alcohol/week: 4 0 standard drinks     Types: 4 Cans of beer per week     Comment: socially    Drug use: No       Review of Systems   Constitutional: Negative for activity change, chills, diaphoresis and fever  HENT: Negative for congestion, sinus pressure and sore throat  Eyes: Negative for pain and visual disturbance     Respiratory: Negative for cough, chest tightness, shortness of breath, wheezing and stridor  Cardiovascular: Negative for chest pain and palpitations  Gastrointestinal: Negative for abdominal distention, abdominal pain, constipation, diarrhea, nausea and vomiting  Genitourinary: Negative for dysuria and frequency  Musculoskeletal: Negative for neck pain and neck stiffness  Skin: Negative for rash  Neurological: Positive for headaches  Negative for dizziness, speech difficulty, light-headedness and numbness  Physical Exam  Physical Exam  Vitals reviewed  Constitutional:       General: He is not in acute distress  Appearance: He is well-developed  He is not diaphoretic  HENT:      Head: Normocephalic and atraumatic  Right Ear: External ear normal       Left Ear: External ear normal       Nose: Nose normal    Eyes:      General:         Right eye: No discharge  Left eye: No discharge  Pupils: Pupils are equal, round, and reactive to light  Neck:      Trachea: No tracheal deviation  Cardiovascular:      Rate and Rhythm: Normal rate and regular rhythm  Heart sounds: Normal heart sounds  No murmur heard  Pulmonary:      Effort: Pulmonary effort is normal  No respiratory distress  Breath sounds: Normal breath sounds  No stridor  Abdominal:      General: There is no distension  Palpations: Abdomen is soft  Tenderness: There is no abdominal tenderness  There is no guarding or rebound  Musculoskeletal:         General: Normal range of motion  Cervical back: Normal range of motion and neck supple  Skin:     General: Skin is warm and dry  Coloration: Skin is not pale  Findings: No erythema  Comments: Laceration to forehead, 4 cm   Neurological:      General: No focal deficit present  Mental Status: He is alert and oriented to person, place, and time           Vital Signs  ED Triage Vitals [03/10/22 1801]   Temperature Pulse Respirations Blood Pressure SpO2   97 9 °F (36 6 °C) 72 18 (!) 178/89 100 %      Temp Source Heart Rate Source Patient Position - Orthostatic VS BP Location FiO2 (%)   Oral Monitor Sitting Left arm --      Pain Score       --           Vitals:    03/10/22 1801   BP: (!) 178/89   Pulse: 72   Patient Position - Orthostatic VS: Sitting         Visual Acuity  Visual Acuity      Most Recent Value   L Pupil Size (mm) 4   R Pupil Size (mm) 4          ED Medications  Medications   ibuprofen (MOTRIN) tablet 400 mg (has no administration in time range)       Diagnostic Studies  Results Reviewed     None                 CT head without contrast   Final Result by Haja Rizvi DO (03/10 2010)      No acute intracranial abnormality  Microangiopathic changes  Workstation performed: [id number]                    Procedures  Laceration repair    Date/Time: 3/10/2022 7:35 PM  Performed by: Nreeyda Gardner DO  Authorized by: Nereyda Gardner DO   Consent: Verbal consent obtained  Risks and benefits: risks, benefits and alternatives were discussed  Consent given by: patient  Required items: required blood products, implants, devices, and special equipment available  Patient identity confirmed: verbally with patient  Body area: head/neck  Location details: forehead  Laceration length: 4 cm    Sedation:  Patient sedated: no        Procedure Details:  Preparation: Patient was prepped and draped in the usual sterile fashion    Irrigation solution: saline  Skin closure: glue  Approximation: close  Approximation difficulty: simple  Patient tolerance: patient tolerated the procedure well with no immediate complications               ED Course                                             MDM  Number of Diagnoses or Management Options  Closed head injury, initial encounter: new and requires workup  Laceration of forehead, initial encounter: new and requires workup  Diagnosis management comments:       Initial ED assessment:  70year-old head injury, laceration to forehead    Initial DDx includes but is not limited to:   CT head to evaluate for intracranial hemorrhage due to headache    Initial ED plan:   Forehead laceration        Final ED summary/disposition:   After evaluation and workup in the emergency department, laceration repaired with skin glue  , CT scan unremarkable  Will discharge  Amount and/or Complexity of Data Reviewed  Tests in the radiology section of CPT®: ordered and reviewed  Decide to obtain previous medical records or to obtain history from someone other than the patient: yes  Obtain history from someone other than the patient: yes  Review and summarize past medical records: yes  Independent visualization of images, tracings, or specimens: yes        Disposition  Final diagnoses:   Closed head injury, initial encounter   Laceration of forehead, initial encounter     Time reflects when diagnosis was documented in both MDM as applicable and the Disposition within this note     Time User Action Codes Description Comment    3/10/2022  8:21 PM Sarah Crowder Add [S09 [id number]] Closed head injury, initial encounter     3/10/2022  8:21 PM Tessa, [address] Laceration of forehead, initial encounter       ED Disposition     ED Disposition Condition Date/Time Comment    Discharge Stable Thu Mar 10, 2022  8:21 PM Nallely Mclean discharge to home/self care  Follow-up Information    None         Patient's Medications   Discharge Prescriptions    No medications on file       No discharge procedures on file      PDMP Review     None          ED Provider  Electronically Signed by           Mireille Renteria DO  03/10/22 2021

## 2022-03-15 NOTE — ED NOTES
Addendum-  Pt was called for discharge call- had a question regarding his ct scan  Called back to explain the results  No further questions, issue resolved       Karley Tyler RN  03/15/22 2280

## 2022-05-24 ENCOUNTER — TELEPHONE (OUTPATIENT)
Dept: DERMATOLOGY | Facility: CLINIC | Age: 72
End: 2022-05-24

## 2022-05-24 NOTE — TELEPHONE ENCOUNTER
Pt called and wanted to schedule his yearly skin check with Dr Randy Porter  I explained Dr Randy Porter will only be going to [facility] after June but I could schedule him in October at [facility] with Dr Randy Porter but pt didn't want to go to [facility] so he said he would call us back when he figured out what he wanted to do

## 2023-09-25 ENCOUNTER — OFFICE VISIT (OUTPATIENT)
Dept: PHYSICAL THERAPY | Facility: REHABILITATION | Age: 73
End: 2023-09-25
Payer: MEDICARE

## 2023-09-25 DIAGNOSIS — M54.2 CERVICALGIA OF OCCIPITO-ATLANTO-AXIAL REGION: Primary | ICD-10-CM

## 2023-09-25 PROCEDURE — 97162 PT EVAL MOD COMPLEX 30 MIN: CPT

## 2023-09-25 PROCEDURE — 97110 THERAPEUTIC EXERCISES: CPT

## 2023-09-25 NOTE — LETTER
2023    Leslee Freeman MD   [facility]  [address] [facility]  [place] [address]    Patient: Osmin Osullivan   YOB: 1950   Date of Visit: 2023     Encounter Diagnosis     ICD-10-CM    1. Cervicalgia of hpwtexdl-gxhjbgc-ynrst region  M54.2           Dear Dr. Abigail Pritchard: Thank you for your recent referral of Osmin Osullivan. Please review the attached evaluation summary from Jacobo's recent visit. Please verify that you agree with the plan of care by signing the attached order. If you have any questions or concerns, please do not hesitate to call. I sincerely appreciate the opportunity to share in the care of one of your patients and hope to have another opportunity to work with you in the near future. Sincerely,    Yue Jenkins, PT      Referring Provider:      I certify that I have read the below Plan of Care and certify the need for these services furnished under this plan of treatment while under my care. Leslee Freeman MD   [address] [facility] [address]  Via Fax: [phone]          PT Evaluation     Today's date: 2023  Patient name: Osmin Osullivan  : 1950  MRN: [id number]  Referring provider: Leslee Freeman MD  Dx:   Encounter Diagnosis     ICD-10-CM    1. Cervicalgia of hulecxgn-bzbckmu-uqiqj region  M54.2                      Assessment  Assessment details: Patient presents to PT with neck pain and headaches. Patient displays decreased cervical ROM, suboccipital tightness and upper cervical restriction. These impairments are leading to pain with sleeping, static postures and use of UE. Patient will benefit from skilled PT to address above impairments and help them return to OF.     Impairments: abnormal coordination, abnormal muscle firing, abnormal muscle tone, abnormal or restricted ROM, abnormal movement, activity intolerance, impaired balance, impaired physical strength, lacks appropriate home exercise program, pain with function, scapular dyskinesis and poor posture   Barriers to therapy: Long covid  Understanding of Dx/Px/POC: good   Prognosis: good    Goals  STG  1. Patient will display decreased pain to 0-2 in 6 weeks  2. Patient will display cervical ROM WNL in 6 weeks  3. Patient will display scapular strength WNL in 6 weeks    LTG  1. Patient will be able to read for 30 minutes without pain in 12 weeks  2. Patient will be able to perform computer work for 30 minutes without pain in 12 weeks  3. Patient will be sleep for 4-6 hours straight without pain in 12 weeks            Plan  Patient would benefit from: PT eval and skilled physical therapy  Planned modality interventions: cryotherapy, thermotherapy: hydrocollator packs, TENS and traction  Planned therapy interventions: activity modification, ADL training, behavior modification, body mechanics training, flexibility, functional ROM exercises, graded activity, graded exercise, home exercise program, work reintegration, transfer training, therapeutic training, therapeutic exercise, therapeutic activities, stretching, strengthening, patient education, postural training, motor coordination training, neuromuscular re-education, manual therapy, joint mobilization and massage  Frequency: 2x week  Duration in visits: 12  Duration in weeks: 6  Plan of Care beginning date: 9/25/2023  Plan of Care expiration date: 11/6/2023        Subjective Evaluation    History of Present Illness  Mechanism of injury: Patient states he has been getting headaches for some time now. They are starting to get more frequent. He went to a dr and received an MRI and was told his only 2 options were surgery or injections. Patient doesn't want to proceed in that manner. Patient complains of B headaches that wrap around the back of the head/ear. Patient has a newer medication that seems to be helping. Patient gets most of his headaches from the sleeping position and the others from being active. Patient is also dealing with some complications from long covid          Not a recurrent problem   Quality of life: good    Patient Goals  Patient goals for therapy: decreased pain, increased motion and increased strength    Pain  Current pain ratin  At best pain ratin  At worst pain ratin  Quality: sharp, tight and pressure  Relieving factors: medications  Aggravating factors: overhead activity    Social Support  Steps to enter house: yes    Employment status: not working    Diagnostic Tests  MRI studies: abnormal        Objective     Active Range of Motion   Cervical/Thoracic Spine       Cervical    Flexion: 40 degrees  Restriction level: minimal  Extension: 20 degrees     with pain Restriction level: minimal  Left lateral flexion: 20 degrees     Restriction level: minimal  Right lateral flexion: 20 degrees     Restriction level minimal  Left rotation: 70 degrees  Right rotation: 70 degrees    Restriction level: minimal    General Comments:      Cervical/Thoracic Comments  Suboccipital tightness noted B  UT tightness noted B            Precautions:       Manuals             STM             Sub-occipital release             traction                          Neuro Re-Ed             rows             Low rows                                                                              Ther Ex             SNAGS             UT s             Levator s                                                                              Ther Activity                                       Gait Training                                       Modalities

## 2023-09-25 NOTE — PROGRESS NOTES
PT Evaluation     Today's date: 2023  Patient name: Chelo Lan  : 1950  MRN: [id number]  Referring provider: Anurag Terrazas MD  Dx:   Encounter Diagnosis     ICD-10-CM    1. Cervicalgia of lnawrxig-cjglelg-ephor region  M54.2                      Assessment  Assessment details: Patient presents to PT with neck pain and headaches. Patient displays decreased cervical ROM, suboccipital tightness and upper cervical restriction. These impairments are leading to pain with sleeping, static postures and use of UE. Patient will benefit from skilled PT to address above impairments and help them return to PLOF. Impairments: abnormal coordination, abnormal muscle firing, abnormal muscle tone, abnormal or restricted ROM, abnormal movement, activity intolerance, impaired balance, impaired physical strength, lacks appropriate home exercise program, pain with function, scapular dyskinesis and poor posture   Barriers to therapy: Long covid  Understanding of Dx/Px/POC: good   Prognosis: good    Goals  STG  1. Patient will display decreased pain to 0-2 in 6 weeks  2. Patient will display cervical ROM WNL in 6 weeks  3. Patient will display scapular strength WNL in 6 weeks    LTG  1. Patient will be able to read for 30 minutes without pain in 12 weeks  2. Patient will be able to perform computer work for 30 minutes without pain in 12 weeks  3.  Patient will be sleep for 4-6 hours straight without pain in 12 weeks            Plan  Patient would benefit from: PT eval and skilled physical therapy  Planned modality interventions: cryotherapy, thermotherapy: hydrocollator packs, TENS and traction  Planned therapy interventions: activity modification, ADL training, behavior modification, body mechanics training, flexibility, functional ROM exercises, graded activity, graded exercise, home exercise program, work reintegration, transfer training, therapeutic training, therapeutic exercise, therapeutic activities, stretching, strengthening, patient education, postural training, motor coordination training, neuromuscular re-education, manual therapy, joint mobilization and massage  Frequency: 2x week  Duration in visits: 12  Duration in weeks: 6  Plan of Care beginning date: 2023  Plan of Care expiration date: 2023        Subjective Evaluation    History of Present Illness  Mechanism of injury: Patient states he has been getting headaches for some time now. They are starting to get more frequent. He went to a dr and received an MRI and was told his only 2 options were surgery or injections. Patient doesn't want to proceed in that manner. Patient complains of B headaches that wrap around the back of the head/ear. Patient has a newer medication that seems to be helping. Patient gets most of his headaches from the sleeping position and the others from being active.  Patient is also dealing with some complications from long covid          Not a recurrent problem   Quality of life: good    Patient Goals  Patient goals for therapy: decreased pain, increased motion and increased strength    Pain  Current pain ratin  At best pain ratin  At worst pain ratin  Quality: sharp, tight and pressure  Relieving factors: medications  Aggravating factors: overhead activity    Social Support  Steps to enter house: yes    Employment status: not working    Diagnostic Tests  MRI studies: abnormal        Objective     Active Range of Motion   Cervical/Thoracic Spine       Cervical    Flexion: 40 degrees  Restriction level: minimal  Extension: 20 degrees     with pain Restriction level: minimal  Left lateral flexion: 20 degrees     Restriction level: minimal  Right lateral flexion: 20 degrees     Restriction level minimal  Left rotation: 70 degrees  Right rotation: 70 degrees    Restriction level: minimal    General Comments:      Cervical/Thoracic Comments  Suboccipital tightness noted B  UT tightness noted B             Precautions: Manuals             STM             Sub-occipital release             traction                          Neuro Re-Ed             rows             Low rows                                                                              Ther Ex             SNAGS             UT s             Levator s                                                                              Ther Activity                                       Gait Training                                       Modalities

## 2023-09-29 ENCOUNTER — OFFICE VISIT (OUTPATIENT)
Dept: PHYSICAL THERAPY | Facility: REHABILITATION | Age: 73
End: 2023-09-29
Payer: MEDICARE

## 2023-09-29 DIAGNOSIS — M54.2 CERVICALGIA OF OCCIPITO-ATLANTO-AXIAL REGION: Primary | ICD-10-CM

## 2023-09-29 PROCEDURE — 97140 MANUAL THERAPY 1/> REGIONS: CPT

## 2023-09-29 PROCEDURE — 97110 THERAPEUTIC EXERCISES: CPT

## 2023-09-29 NOTE — PROGRESS NOTES
Daily Note     Today's date: 2023  Patient name: Ritesh Casey  : 1950  MRN: [id number]  Referring provider: Javon Argueta MD  Dx:   Encounter Diagnosis     ICD-10-CM    1. Cervicalgia of pbortwab-fbvadbt-vcxlt region  M54.2                      Subjective: patient states he woke up with a headaches this morning      Objective: See treatment diary below      Assessment: Tolerated treatment well. Patient demonstrated fatigue post treatment, exhibited good technique with therapeutic exercises and would benefit from continued PT Patient with improving symptoms after STM, traction and suboccipital release      Plan: Continue per plan of care.       Precautions:       Manuals             STM done            Sub-occipital release done            traction done                         Neuro Re-Ed             rows             Low rows                                                                              Ther Ex             SNAGS 5"x10 B            UT s             Levator s                                                                              Ther Activity                                       Gait Training                                       Modalities

## 2023-10-02 ENCOUNTER — OFFICE VISIT (OUTPATIENT)
Dept: PHYSICAL THERAPY | Facility: REHABILITATION | Age: 73
End: 2023-10-02
Payer: MEDICARE

## 2023-10-02 DIAGNOSIS — M54.2 CERVICALGIA OF OCCIPITO-ATLANTO-AXIAL REGION: Primary | ICD-10-CM

## 2023-10-02 PROCEDURE — 97110 THERAPEUTIC EXERCISES: CPT | Performed by: PHYSICAL THERAPIST

## 2023-10-02 PROCEDURE — 97110 THERAPEUTIC EXERCISES: CPT

## 2023-10-02 PROCEDURE — 97140 MANUAL THERAPY 1/> REGIONS: CPT | Performed by: PHYSICAL THERAPIST

## 2023-10-02 PROCEDURE — 97140 MANUAL THERAPY 1/> REGIONS: CPT

## 2023-10-02 NOTE — PROGRESS NOTES
Daily Note     Today's date: 10/2/2023  Patient name: Kenan Gilmore  : 1950  MRN: [id number]  Referring provider: Elizabeth Perez MD  Dx:   Encounter Diagnosis     ICD-10-CM    1. Cervicalgia of zapdugdm-khsibhn-qhjag region  M54.2                      Subjective: patient states he already felt less of a headaches after last session so he is hopeful this will work       Objective: See treatment diary below      Assessment: Tolerated treatment well. Patient demonstrated fatigue post treatment, exhibited good technique with therapeutic exercises and would benefit from continued PT Patient already dispaying improving symptoms after last session. Kept the plan the same today. Once symptoms stabilize, we will progress strengthening/stretching      Plan: Continue per plan of care.       Precautions:       Manuals 9/29 10/2           STM done done           Sub-occipital release done done           traction done done                        Neuro Re-Ed             rows             Low rows                                                                              Ther Ex             SNAGS 5"x10 B reviewed           UT s             Levator s                                                                              Ther Activity                                       Gait Training                                       Modalities

## 2023-10-04 ENCOUNTER — OFFICE VISIT (OUTPATIENT)
Dept: PHYSICAL THERAPY | Facility: REHABILITATION | Age: 73
End: 2023-10-04
Payer: MEDICARE

## 2023-10-04 DIAGNOSIS — M54.2 CERVICALGIA OF OCCIPITO-ATLANTO-AXIAL REGION: Primary | ICD-10-CM

## 2023-10-04 PROCEDURE — 97112 NEUROMUSCULAR REEDUCATION: CPT

## 2023-10-04 PROCEDURE — 97110 THERAPEUTIC EXERCISES: CPT

## 2023-10-04 PROCEDURE — 97110 THERAPEUTIC EXERCISES: CPT | Performed by: PHYSICAL THERAPIST

## 2023-10-04 PROCEDURE — 97140 MANUAL THERAPY 1/> REGIONS: CPT | Performed by: PHYSICAL THERAPIST

## 2023-10-04 PROCEDURE — 97112 NEUROMUSCULAR REEDUCATION: CPT | Performed by: PHYSICAL THERAPIST

## 2023-10-04 PROCEDURE — 97140 MANUAL THERAPY 1/> REGIONS: CPT

## 2023-10-04 NOTE — PROGRESS NOTES
Daily Note     Today's date: 10/4/2023  Patient name: Domi Acevedo  : 1950  MRN: [id number]  Referring provider: Dionicio Nunez MD  Dx:   Encounter Diagnosis     ICD-10-CM    1. Cervicalgia of bbhoglmb-qmnmric-snped region  M54.2                      Subjective: patient woke up the other day with a slight headache but it worked itself out with some of the stretches      Objective: See treatment diary below      Assessment: Tolerated treatment well. Patient demonstrated fatigue post treatment, exhibited good technique with therapeutic exercises and would benefit from continued PT Patient with improving symptoms. Initiated scap strengthening today. Cues required for keeping shoulders down      Plan: Continue per plan of care.       Precautions:       Manuals 9/29 10/2 10/4          STM done done done          Sub-occipital release done done done          traction done done done                       Neuro Re-Ed             rows   grn 2x10          Low rows                                                                              Ther Ex             SNAGS 5"x10 B reviewed reviewed          UT s             Levator s                                                                              Ther Activity                                       Gait Training                                       Modalities

## 2023-10-10 ENCOUNTER — OFFICE VISIT (OUTPATIENT)
Dept: PHYSICAL THERAPY | Facility: REHABILITATION | Age: 73
End: 2023-10-10
Payer: MEDICARE

## 2023-10-10 DIAGNOSIS — M54.2 CERVICALGIA OF OCCIPITO-ATLANTO-AXIAL REGION: Primary | ICD-10-CM

## 2023-10-10 PROCEDURE — 97140 MANUAL THERAPY 1/> REGIONS: CPT | Performed by: PHYSICAL THERAPIST

## 2023-10-10 PROCEDURE — 97110 THERAPEUTIC EXERCISES: CPT | Performed by: PHYSICAL THERAPIST

## 2023-10-10 NOTE — PROGRESS NOTES
Daily Note     Today's date: 10/10/2023  Patient name: Dustin Conway  : 1950  MRN: [id number]  Referring provider: Zonia Nageotte, MD  Dx:   Encounter Diagnosis     ICD-10-CM    1. Cervicalgia of gvqkgjgq-pnvwoac-wqsqh region  M54.2                      Subjective: patient states he had a little setback this weekend. He had a strong headache on  and now he feels like he has a kink in his neck      Objective: See treatment diary below      Assessment: Tolerated treatment well. Patient demonstrated fatigue post treatment, exhibited good technique with therapeutic exercises and would benefit from continued PT Patient with slight set back this weekend. Headaches seemed to potentially be caused by something other than a cervical involvement. Patient continues to have suboccipital tightness      Plan: Continue per plan of care.       Precautions:       Manuals 9/29 10/2 10/4 10/10         STM done done done done         Sub-occipital release done done done done         traction done done done done                      Neuro Re-Ed             rows   grn 2x10          Low rows                                                                              Ther Ex             SNAGS 5"x10 B reviewed reviewed reviewed         UT s             Levator s                                                                              Ther Activity                                       Gait Training                                       Modalities

## 2023-10-12 ENCOUNTER — OFFICE VISIT (OUTPATIENT)
Dept: PHYSICAL THERAPY | Facility: REHABILITATION | Age: 73
End: 2023-10-12
Payer: MEDICARE

## 2023-10-12 DIAGNOSIS — M54.2 CERVICALGIA OF OCCIPITO-ATLANTO-AXIAL REGION: Primary | ICD-10-CM

## 2023-10-12 PROCEDURE — 97110 THERAPEUTIC EXERCISES: CPT | Performed by: PHYSICAL THERAPIST

## 2023-10-12 PROCEDURE — 97140 MANUAL THERAPY 1/> REGIONS: CPT | Performed by: PHYSICAL THERAPIST

## 2023-10-12 NOTE — PROGRESS NOTES
Daily Note     Today's date: 10/12/2023  Patient name: Master Mittal  : 1950  MRN: [id number]  Referring provider: Floyd Tompkins MD  Dx:   Encounter Diagnosis     ICD-10-CM    1. Cervicalgia of hvokntoc-hrtlqsz-pawco region  M54.2                      Subjective: patient states he had another headache the other day while playing golf      Objective: See treatment diary below      Assessment: Tolerated treatment well. Patient demonstrated fatigue post treatment, exhibited good technique with therapeutic exercises, and would benefit from continued PT Patient continues to have upper cervical tightness. Discussed adding chin tucks to his home program      Plan: Continue per plan of care.       Precautions:       Manuals 9/29 10/2 10/4 10/10 10/12        STM done done done done done        Sub-occipital release done done done done done        traction done done done done done                     Neuro Re-Ed             rows   grn 2x10          Low rows                                                                              Ther Ex             SNAGS 5"x10 B reviewed reviewed reviewed reviwed        UT s             Levator s             Chin tucks     10"x10                                                            Ther Activity                                       Gait Training                                       Modalities

## 2023-10-17 ENCOUNTER — OFFICE VISIT (OUTPATIENT)
Dept: PHYSICAL THERAPY | Facility: REHABILITATION | Age: 73
End: 2023-10-17
Payer: MEDICARE

## 2023-10-17 DIAGNOSIS — M54.2 CERVICALGIA OF OCCIPITO-ATLANTO-AXIAL REGION: Primary | ICD-10-CM

## 2023-10-17 PROCEDURE — 97140 MANUAL THERAPY 1/> REGIONS: CPT | Performed by: PHYSICAL THERAPIST

## 2023-10-17 PROCEDURE — 97110 THERAPEUTIC EXERCISES: CPT | Performed by: PHYSICAL THERAPIST

## 2023-10-17 NOTE — PROGRESS NOTES
Daily Note     Today's date: 10/17/2023  Patient name: Shyla Rangel  : 1950  MRN: [id number]  Referring provider: Frank Avila MD  Dx:   Encounter Diagnosis     ICD-10-CM    1. Cervicalgia of owptzqmo-urhjehy-wqqaa region  M54.2                      Subjective: patient states he has been feeling good since last session      Objective: See treatment diary below      Assessment: Tolerated treatment well. Patient demonstrated fatigue post treatment, exhibited good technique with therapeutic exercises, and would benefit from continued PT Patient with improving symptoms for the last couple of days. Patient continues to respond well to soft tissue work and traction of neck. Continue to monitor symptoms      Plan: Continue per plan of care.       Precautions:       Manuals 9/29 10/2 10/4 10/10 10/12 10/17       STM done done done done done done       Sub-occipital release done done done done done done       traction done done done done done done                    Neuro Re-Ed             rows   grn 2x10          Low rows                                                                              Ther Ex             SNAGS 5"x10 B reviewed reviewed reviewed reviwed 5"x10       UT s             Levator s             Chin tucks     10"x10 10"x10                                                           Ther Activity                                       Gait Training                                       Modalities

## 2023-10-19 ENCOUNTER — OFFICE VISIT (OUTPATIENT)
Dept: PHYSICAL THERAPY | Facility: REHABILITATION | Age: 73
End: 2023-10-19
Payer: MEDICARE

## 2023-10-19 DIAGNOSIS — M54.2 CERVICALGIA OF OCCIPITO-ATLANTO-AXIAL REGION: Primary | ICD-10-CM

## 2023-10-19 PROCEDURE — 97140 MANUAL THERAPY 1/> REGIONS: CPT | Performed by: PHYSICAL THERAPIST

## 2023-10-19 PROCEDURE — 97110 THERAPEUTIC EXERCISES: CPT | Performed by: PHYSICAL THERAPIST

## 2023-10-19 NOTE — PROGRESS NOTES
Daily Note     Today's date: 10/19/2023  Patient name: Dustin Conway  : 1950  MRN: [id number]  Referring provider: Zonia Nageotte, MD  Dx:   Encounter Diagnosis     ICD-10-CM    1. Cervicalgia of yuargobg-ziquwvm-bnvkq region  M54.2                      Subjective: patient states he did have a headache yesterday but it wasn't as bad      Objective: See treatment diary below      Assessment: Tolerated treatment well. Patient demonstrated fatigue post treatment, exhibited good technique with therapeutic exercises, and would benefit from continued PT Patient with improving tightness felt in the cervical region. Patient having less frequent headaches       Plan: Continue per plan of care.       Precautions:       Manuals 9/29 10/2 10/4 10/10 10/12 10/17 10/19      STM done done done done done done done      Sub-occipital release done done done done done done done      traction done done done done done done done                   Neuro Re-Ed             rows   grn 2x10          Low rows                                                                              Ther Ex             SNAGS 5"x10 B reviewed reviewed reviewed reviwed 5"x10 HEP      UT s             Levator s             Chin tucks     10"x10 10"x10 HEP                                                          Ther Activity                                       Gait Training                                       Modalities

## 2023-10-24 ENCOUNTER — OFFICE VISIT (OUTPATIENT)
Dept: PHYSICAL THERAPY | Facility: REHABILITATION | Age: 73
End: 2023-10-24
Payer: MEDICARE

## 2023-10-24 DIAGNOSIS — M54.2 CERVICALGIA OF OCCIPITO-ATLANTO-AXIAL REGION: Primary | ICD-10-CM

## 2023-10-24 PROCEDURE — 97110 THERAPEUTIC EXERCISES: CPT | Performed by: PHYSICAL THERAPIST

## 2023-10-24 PROCEDURE — 97140 MANUAL THERAPY 1/> REGIONS: CPT | Performed by: PHYSICAL THERAPIST

## 2023-10-24 NOTE — PROGRESS NOTES
Daily Note     Today's date: 10/24/2023  Patient name: Gregoria De La Rosa  : 1950  MRN: [id number]  Referring provider: Ulises Morrow MD  Dx:   Encounter Diagnosis     ICD-10-CM    1. Cervicalgia of enkiwekk-nboqtxo-nleil region  M54.2                      Subjective: patient sttaes he has a mild headache today but the last couple of days he has been good      Objective: See treatment diary below      Assessment: Tolerated treatment well. Patient demonstrated fatigue post treatment, exhibited good technique with therapeutic exercises, and would benefit from continued PT Patient has been doing better over the last week. Patient with less tightness of UT and paraspinals      Plan: Continue per plan of care.       Precautions:       Manuals 9/29 10/2 10/4 10/10 10/12 10/17 10/19 10/24     STM done done done done done done done done     Sub-occipital release done done done done done done done done     traction done done done done done done done done                  Neuro Re-Ed             rows   grn 2x10          Low rows                                                                              Ther Ex             SNAGS 5"x10 B reviewed reviewed reviewed reviwed 5"x10 HEP HEP     UT s             Levator s             Chin tucks     10"x10 10"x10 HEP HEP                                                         Ther Activity                                       Gait Training                                       Modalities

## 2023-10-26 ENCOUNTER — OFFICE VISIT (OUTPATIENT)
Dept: PHYSICAL THERAPY | Facility: REHABILITATION | Age: 73
End: 2023-10-26
Payer: MEDICARE

## 2023-10-26 DIAGNOSIS — M54.2 CERVICALGIA OF OCCIPITO-ATLANTO-AXIAL REGION: Primary | ICD-10-CM

## 2023-10-26 PROCEDURE — 97140 MANUAL THERAPY 1/> REGIONS: CPT | Performed by: PHYSICAL THERAPIST

## 2023-10-26 PROCEDURE — 97110 THERAPEUTIC EXERCISES: CPT | Performed by: PHYSICAL THERAPIST

## 2023-10-26 NOTE — PROGRESS NOTES
Daily Note     Today's date: 10/26/2023  Patient name: Demetra Goldmann  : 1950  MRN: [id number]  Referring provider: Damien Canavan, MD  Dx:   Encounter Diagnosis     ICD-10-CM    1. Cervicalgia of vkkegnxt-hirxsok-wufiu region  M54.2                      Subjective: patient states he woke up yesterday morning with a kink in his neck where he had trouble looking to the L      Objective: See treatment diary below      Assessment: Tolerated treatment well. Patient demonstrated fatigue post treatment, exhibited good technique with therapeutic exercises, and would benefit from continued PT Patient with limited L rotation of neck. Improved ROM after manual therapy      Plan: Continue per plan of care.       Precautions:       Manuals 9/29 10/2 10/4 10/10 10/12 10/17 10/19 10/24 10/26    STM done done done done done done done done done    Sub-occipital release done done done done done done done done done    traction done done done done done done done done done                 Neuro Re-Ed             rows   grn 2x10          Low rows                                                                              Ther Ex             SNAGS 5"x10 B reviewed reviewed reviewed reviwed 5"x10 HEP HEP HEP    UT s             Levator s             Chin tucks     10"x10 10"x10 HEP HEP HEP                                                        Ther Activity                                       Gait Training                                       Modalities

## 2023-10-31 ENCOUNTER — OFFICE VISIT (OUTPATIENT)
Dept: PHYSICAL THERAPY | Facility: REHABILITATION | Age: 73
End: 2023-10-31
Payer: MEDICARE

## 2023-10-31 DIAGNOSIS — M54.2 CERVICALGIA OF OCCIPITO-ATLANTO-AXIAL REGION: Primary | ICD-10-CM

## 2023-10-31 PROCEDURE — 97140 MANUAL THERAPY 1/> REGIONS: CPT | Performed by: PHYSICAL THERAPIST

## 2023-10-31 PROCEDURE — 97110 THERAPEUTIC EXERCISES: CPT | Performed by: PHYSICAL THERAPIST

## 2023-11-02 ENCOUNTER — OFFICE VISIT (OUTPATIENT)
Dept: PHYSICAL THERAPY | Facility: REHABILITATION | Age: 73
End: 2023-11-02
Payer: MEDICARE

## 2023-11-02 DIAGNOSIS — M54.2 CERVICALGIA OF OCCIPITO-ATLANTO-AXIAL REGION: Primary | ICD-10-CM

## 2023-11-02 PROCEDURE — 97140 MANUAL THERAPY 1/> REGIONS: CPT | Performed by: PHYSICAL THERAPIST

## 2023-11-02 PROCEDURE — 97110 THERAPEUTIC EXERCISES: CPT | Performed by: PHYSICAL THERAPIST

## 2023-11-02 NOTE — PROGRESS NOTES
Daily Note     Today's date: 2023  Patient name: Roxane Escalante  : 1950  MRN: [id number]  Referring provider: Judi Delgado MD  Dx:   Encounter Diagnosis     ICD-10-CM    1. Cervicalgia of ikvtgtrv-cnskdhw-gyhut region  M54.2                      Subjective: patient states the headaches have been good the last couple of days and the tightness in his neck is improving      Objective: See treatment diary below      Assessment: Tolerated treatment well. Patient demonstrated fatigue post treatment, exhibited good technique with therapeutic exercises, and would benefit from continued PT Patient continues to have mild limitation in L rotation but is displaying improving tightness of UT and cervical paraspinals      Plan: Continue per plan of care.       Precautions:       Manuals 9/29 10/2 10/4 10/10 10/12 10/17 10/19 10/24 10/26 10/31 11/2   STM done done done done done done done done done done done   Sub-occipital release done done done done done done done done done done done   traction done done done done done done done done done done done                 Neuro Re-Ed              rows   grn 2x10           Low rows                                                                                    Ther Ex              SNAGS 5"x10 B reviewed reviewed reviewed reviwed 5"x10 HEP HEP HEP HEP HEP   UT s              Levator s              Chin tucks     10"x10 10"x10 HEP HEP HEP HEP HEP                                                           Ther Activity                                          Gait Training                                          Modalities

## 2023-11-08 ENCOUNTER — OFFICE VISIT (OUTPATIENT)
Dept: PHYSICAL THERAPY | Facility: REHABILITATION | Age: 73
End: 2023-11-08
Payer: MEDICARE

## 2023-11-08 DIAGNOSIS — M54.2 CERVICALGIA OF OCCIPITO-ATLANTO-AXIAL REGION: Primary | ICD-10-CM

## 2023-11-08 PROCEDURE — 97140 MANUAL THERAPY 1/> REGIONS: CPT | Performed by: PHYSICAL THERAPIST

## 2023-11-08 PROCEDURE — 97110 THERAPEUTIC EXERCISES: CPT | Performed by: PHYSICAL THERAPIST

## 2023-11-08 NOTE — PROGRESS NOTES
Daily Note     Today's date: 2023  Patient name: Roscoe Velásquez  : 1950  MRN: [id number]  Referring provider: Odalys Monahan MD  Dx:   Encounter Diagnosis     ICD-10-CM    1. Cervicalgia of ozrxxudi-atxnhlk-pziwr region  M54.2                      Subjective: patient states he continues to have less headaches despite golfing and doing work around the house      Objective: See treatment diary below      Assessment: Tolerated treatment well. Patient demonstrated fatigue post treatment, exhibited good technique with therapeutic exercises, and would benefit from continued PT Patient did well despite having a week off from PT. Scheduled next appt for 2 weeks. If patient continues to do well, he will be discharged at that time      Plan: Continue per plan of care.       Precautions:       Manuals 9/29 10/2 10/4 10/10 10/12 10/17 10/19 10/24 10/26 10/31 11/2 11/8   STM done done done done done done done done done done done done   Sub-occipital release done done done done done done done done done done done done   traction done done done done done done done done done done done done                  Neuro Re-Ed               rows   grn 2x10            Low rows                                                                                          Ther Ex               SNAGS 5"x10 B reviewed reviewed reviewed reviwed 5"x10 HEP HEP HEP HEP HEP HEP   UT s               Levator s               Chin tucks     10"x10 10"x10 HEP HEP HEP HEP HEP HEP                                                               Ther Activity                                             Gait Training                                             Modalities

## 2023-11-21 ENCOUNTER — OFFICE VISIT (OUTPATIENT)
Dept: PHYSICAL THERAPY | Facility: REHABILITATION | Age: 73
End: 2023-11-21
Payer: MEDICARE

## 2023-11-21 DIAGNOSIS — M54.2 CERVICALGIA OF OCCIPITO-ATLANTO-AXIAL REGION: Primary | ICD-10-CM

## 2023-11-21 PROCEDURE — 97140 MANUAL THERAPY 1/> REGIONS: CPT | Performed by: PHYSICAL THERAPIST

## 2023-11-21 PROCEDURE — 97110 THERAPEUTIC EXERCISES: CPT | Performed by: PHYSICAL THERAPIST

## 2023-11-21 NOTE — PROGRESS NOTES
Daily Note     Today's date: 2023  Patient name: Michaela Emerson  : 1950  MRN: [id number]  Referring provider: Carmella Moore MD  Dx:   Encounter Diagnosis     ICD-10-CM    1. Cervicalgia of pxpmufxi-aiwnoue-unxux region  M54.2                      Subjective: patient states he has been able to keep his symptoms under control despite not coming to PT      Objective: See treatment diary below      Assessment: Tolerated treatment well. Patient demonstrated fatigue post treatment, exhibited good technique with therapeutic exercises, and would benefit from continued PT Patient is going to continues his exercises at home and check in if needed      Plan: Continue per plan of care.       Precautions:       Manuals 9/29 10/2 10/4 10/10 10/12 10/17 10/19 10/24 10/26 10/31 11/2 11/8 11/21   STM done done done done done done done done done done done done done   Sub-occipital release done done done done done done done done done done done done done   traction done done done done done done done done done done done done done                   Neuro Re-Ed                rows   grn 2x10             Low rows                                                                                                Ther Ex                SNAGS 5"x10 B reviewed reviewed reviewed reviwed 5"x10 HEP HEP HEP HEP HEP HEP HEP   UT s                Levator s                Chin tucks     10"x10 10"x10 HEP HEP HEP HEP HEP HEP HEP                                                                   Ther Activity                                                Gait Training                                                Modalities

## 2024-01-02 ENCOUNTER — OFFICE VISIT (OUTPATIENT)
Dept: PHYSICAL THERAPY | Facility: REHABILITATION | Age: 74
End: 2024-01-02
Payer: MEDICARE

## 2024-01-02 DIAGNOSIS — M54.2 NECK PAIN: Primary | ICD-10-CM

## 2024-01-02 PROCEDURE — 97162 PT EVAL MOD COMPLEX 30 MIN: CPT | Performed by: PHYSICAL THERAPIST

## 2024-01-02 NOTE — PROGRESS NOTES
PT Evaluation     Today's date: 2024  Patient name: Jacobo Bliss  : 1950  MRN: [id number]  Referring provider: Jose Luis Garcia MD  Dx:   Encounter Diagnosis     ICD-10-CM    1. Neck pain  M54.2                      Assessment  Assessment details: Patient presents to PT with neck pain and headaches. Patient displays decreased cervical ROM, suboccipital tightness and upper cervical restriction. These impairments are leading to pain with sleeping, static postures and use of UE. Patient will benefit from skilled PT to address above impairments and help them return to PLOF.    Impairments: abnormal coordination, abnormal muscle firing, abnormal muscle tone, abnormal or restricted ROM, abnormal movement, activity intolerance, impaired balance, impaired physical strength, lacks appropriate home exercise program, pain with function, scapular dyskinesis and poor posture   Barriers to therapy: Long covid  Understanding of Dx/Px/POC: good   Prognosis: good    Goals  STG  1. Patient will display decreased pain to 0-2 in 6 weeks  2. Patient will display cervical ROM WNL in 6 weeks  3. Patient will display scapular strength WNL in 6 weeks    LTG  1. Patient will be able to read for 30 minutes without pain in 12 weeks  2. Patient will be able to perform computer work for 30 minutes without pain in 12 weeks  3. Patient will be sleep for 4-6 hours straight without pain in 12 weeks            Plan  Patient would benefit from: PT eval and skilled physical therapy  Planned modality interventions: cryotherapy, thermotherapy: hydrocollator packs, TENS and traction  Planned therapy interventions: activity modification, ADL training, behavior modification, body mechanics training, flexibility, functional ROM exercises, graded activity, graded exercise, home exercise program, work reintegration, transfer training, therapeutic training, therapeutic exercise, therapeutic activities, stretching, strengthening, patient education,  postural training, motor coordination training, neuromuscular re-education, manual therapy, joint mobilization and massage  Frequency: 2x week  Duration in visits: 12  Duration in weeks: 6  Plan of Care beginning date: 2023  Plan of Care expiration date: 2023        Subjective Evaluation    History of Present Illness  Mechanism of injury: Patient states he has been getting headaches for some time now. They are starting to get more frequent. He went to a dr and received an MRI and was told his only 2 options were surgery or injections. Patient doesn't want to proceed in that manner. Patient complains of B headaches that wrap around the back of the head/ear. Patient has a newer medication that seems to be helping. Patient gets most of his headaches from the sleeping position and the others from being active. Patient is also dealing with some complications from long covid          Not a recurrent problem   Quality of life: good    Patient Goals  Patient goals for therapy: decreased pain, increased motion and increased strength    Pain  Current pain ratin  At best pain ratin  At worst pain ratin  Quality: sharp, tight and pressure  Relieving factors: medications  Aggravating factors: overhead activity    Social Support  Steps to enter house: yes    Employment status: not working    Diagnostic Tests  MRI studies: abnormal        Objective     Active Range of Motion   Cervical/Thoracic Spine       Cervical    Flexion: 40 degrees  Restriction level: minimal  Extension: 20 degrees     with pain Restriction level: minimal  Left lateral flexion: 20 degrees     Restriction level: minimal  Right lateral flexion: 20 degrees     Restriction level minimal  Left rotation: 70 degrees  Right rotation: 70 degrees    Restriction level: minimal    General Comments:      Cervical/Thoracic Comments  Suboccipital tightness noted B  UT tightness noted B             Precautions:       Manuals             STM                                                     Neuro Re-Ed             row             Low row                                                                              Ther Ex             UT s             Levator s                                                                                           Ther Activity                                       Gait Training                                       Modalities

## 2024-01-02 NOTE — LETTER
2024    Jose Luis Garcia MD   [facility]  [address]  [facility] [address]    Patient: Jacoob Bliss   YOB: 1950   Date of Visit: 2024     Encounter Diagnosis     ICD-10-CM    1. Neck pain  M54.2           Dear Dr. Garcia:    Thank you for your recent referral of Jacobo Bliss. Please review the attached evaluation summary from Jacobo's recent visit.     Please verify that you agree with the plan of care by signing the attached order.     If you have any questions or concerns, please do not hesitate to call.     I sincerely appreciate the opportunity to share in the care of one of your patients and hope to have another opportunity to work with you in the near future.       Sincerely,    Silver Martni, PT      Referring Provider:      I certify that I have read the below Plan of Care and certify the need for these services furnished under this plan of treatment while under my care.                    Jose Luis Garcia MD   [facility]  [address]  [facility] [address]  Via Fax: [phone]          PT Evaluation     Today's date: 2024  Patient name: Jacobo Bliss  : 1950  MRN: [id number]  Referring provider: Jose Luis Garcia MD  Dx:   Encounter Diagnosis     ICD-10-CM    1. Neck pain  M54.2                      Assessment  Assessment details: Patient presents to PT with neck pain and headaches. Patient displays decreased cervical ROM, suboccipital tightness and upper cervical restriction. These impairments are leading to pain with sleeping, static postures and use of UE. Patient will benefit from skilled PT to address above impairments and help them return to OF.    Impairments: abnormal coordination, abnormal muscle firing, abnormal muscle tone, abnormal or restricted ROM, abnormal movement, activity intolerance, impaired balance, impaired physical strength, lacks appropriate home exercise program, pain with function, scapular dyskinesis and poor posture   Barriers to therapy: Long  covid  Understanding of Dx/Px/POC: good   Prognosis: good    Goals  STG  1. Patient will display decreased pain to 0-2 in 6 weeks  2. Patient will display cervical ROM WNL in 6 weeks  3. Patient will display scapular strength WNL in 6 weeks    LTG  1. Patient will be able to read for 30 minutes without pain in 12 weeks  2. Patient will be able to perform computer work for 30 minutes without pain in 12 weeks  3. Patient will be sleep for 4-6 hours straight without pain in 12 weeks            Plan  Patient would benefit from: PT eval and skilled physical therapy  Planned modality interventions: cryotherapy, thermotherapy: hydrocollator packs, TENS and traction  Planned therapy interventions: activity modification, ADL training, behavior modification, body mechanics training, flexibility, functional ROM exercises, graded activity, graded exercise, home exercise program, work reintegration, transfer training, therapeutic training, therapeutic exercise, therapeutic activities, stretching, strengthening, patient education, postural training, motor coordination training, neuromuscular re-education, manual therapy, joint mobilization and massage  Frequency: 2x week  Duration in visits: 12  Duration in weeks: 6  Plan of Care beginning date: 9/25/2023  Plan of Care expiration date: 11/6/2023        Subjective Evaluation    History of Present Illness  Mechanism of injury: Patient states he has been getting headaches for some time now. They are starting to get more frequent. He went to a dr and received an MRI and was told his only 2 options were surgery or injections. Patient doesn't want to proceed in that manner. Patient complains of B headaches that wrap around the back of the head/ear. Patient has a newer medication that seems to be helping. Patient gets most of his headaches from the sleeping position and the others from being active. Patient is also dealing with some complications from long covid          Not a recurrent  problem   Quality of life: good    Patient Goals  Patient goals for therapy: decreased pain, increased motion and increased strength    Pain  Current pain ratin  At best pain ratin  At worst pain ratin  Quality: sharp, tight and pressure  Relieving factors: medications  Aggravating factors: overhead activity    Social Support  Steps to enter house: yes    Employment status: not working    Diagnostic Tests  MRI studies: abnormal        Objective     Active Range of Motion   Cervical/Thoracic Spine       Cervical    Flexion: 40 degrees  Restriction level: minimal  Extension: 20 degrees     with pain Restriction level: minimal  Left lateral flexion: 20 degrees     Restriction level: minimal  Right lateral flexion: 20 degrees     Restriction level minimal  Left rotation: 70 degrees  Right rotation: 70 degrees    Restriction level: minimal    General Comments:      Cervical/Thoracic Comments  Suboccipital tightness noted B  UT tightness noted B             Precautions:       Manuals             STM                                                    Neuro Re-Ed             row             Low row                                                                              Ther Ex             UT s             Levator s                                                                                           Ther Activity                                       Gait Training                                       Modalities

## 2024-01-04 ENCOUNTER — OFFICE VISIT (OUTPATIENT)
Dept: PHYSICAL THERAPY | Facility: REHABILITATION | Age: 74
End: 2024-01-04
Payer: MEDICARE

## 2024-01-04 DIAGNOSIS — M54.2 NECK PAIN: Primary | ICD-10-CM

## 2024-01-04 PROCEDURE — 97140 MANUAL THERAPY 1/> REGIONS: CPT | Performed by: PHYSICAL THERAPIST

## 2024-01-04 PROCEDURE — 97110 THERAPEUTIC EXERCISES: CPT | Performed by: PHYSICAL THERAPIST

## 2024-01-04 NOTE — PROGRESS NOTES
"Daily Note     Today's date: 2024  Patient name: Jacobo Bliss  : 1950  MRN: [id number]  Referring provider: Jose Luis Garcai MD  Dx:   Encounter Diagnosis     ICD-10-CM    1. Neck pain  M54.2                      Subjective: patient states his neck is feeling a little better but he is still getting a headache just about everyday      Objective: See treatment diary below      Assessment: Tolerated treatment well. Patient demonstrated fatigue post treatment, exhibited good technique with therapeutic exercises, and would benefit from continued PT Patient continues to display soft tissue restrictions of UT, cervical paraspinals and suboccipitals      Plan: Continue per plan of care.      Precautions:       Manuals             STM done                                                   Neuro Re-Ed             row             Low row                                                                              Ther Ex             UT s reviewed            Levator s reviewd            ltr 5\"x20            Piriformis s 20sx3                                                                Ther Activity                                       Gait Training                                       Modalities                                            "

## 2024-01-08 ENCOUNTER — APPOINTMENT (OUTPATIENT)
Dept: PHYSICAL THERAPY | Facility: REHABILITATION | Age: 74
End: 2024-01-08
Payer: MEDICARE

## 2024-01-08 ENCOUNTER — OFFICE VISIT (OUTPATIENT)
Dept: PHYSICAL THERAPY | Facility: REHABILITATION | Age: 74
End: 2024-01-08
Payer: MEDICARE

## 2024-01-08 DIAGNOSIS — M54.2 NECK PAIN: Primary | ICD-10-CM

## 2024-01-08 PROCEDURE — 97140 MANUAL THERAPY 1/> REGIONS: CPT | Performed by: PHYSICAL THERAPIST

## 2024-01-08 PROCEDURE — 97110 THERAPEUTIC EXERCISES: CPT | Performed by: PHYSICAL THERAPIST

## 2024-01-08 NOTE — PROGRESS NOTES
"Daily Note     Today's date: 2024  Patient name: Jacobo Bliss  : 1950  MRN: [id number]  Referring provider: Jose Luis Garcia MD  Dx:   Encounter Diagnosis     ICD-10-CM    1. Neck pain  M54.2                      Subjective: patient states he is still having more intense headaches      Objective: See treatment diary below      Assessment: Tolerated treatment well. Patient demonstrated fatigue post treatment, exhibited good technique with therapeutic exercises, and would benefit from continued PT Patient with continual soft tissue restrictions especially of suboccipitals. Patient continues to get more intense headaches but it has only been 3 visits      Plan: Continue per plan of care.      Precautions:       Manuals            STM done done                                                  Neuro Re-Ed             row             Low row                                                                              Ther Ex             UT s reviewed reviewed           Levator s reviewd reviewed           ltr 5\"x20            Piriformis s 20sx3                                                                Ther Activity                                       Gait Training                                       Modalities                                              " Nurse requested ACT. MD declined need for ACT at this time.

## 2024-01-10 ENCOUNTER — OFFICE VISIT (OUTPATIENT)
Dept: PHYSICAL THERAPY | Facility: REHABILITATION | Age: 74
End: 2024-01-10
Payer: MEDICARE

## 2024-01-10 DIAGNOSIS — M54.2 NECK PAIN: Primary | ICD-10-CM

## 2024-01-10 PROCEDURE — 97110 THERAPEUTIC EXERCISES: CPT | Performed by: PHYSICAL THERAPIST

## 2024-01-10 PROCEDURE — 97140 MANUAL THERAPY 1/> REGIONS: CPT | Performed by: PHYSICAL THERAPIST

## 2024-01-10 NOTE — PROGRESS NOTES
"Daily Note     Today's date: 1/10/2024  Patient name: Jacobo Bliss  : 1950  MRN: [id number]  Referring provider: Jose Luis Garcia MD  Dx:   Encounter Diagnosis     ICD-10-CM    1. Neck pain  M54.2                      Subjective: patient states today was the first morning that he didn't have a headache in a couple of weeks      Objective: See treatment diary below      Assessment: Tolerated treatment well. Patient demonstrated fatigue post treatment, exhibited good technique with therapeutic exercises, and would benefit from continued PT Patient with continual soft tissue restrictions of upper traps, cervical paraspinals and suboccpitals but they are improving since last sessopm      Plan: Continue per plan of care.      Precautions:       Manuals 1/4 1/8 1/10          STM done done done                                                 Neuro Re-Ed             row   discussed          Low row                                                                              Ther Ex             UT s reviewed reviewed 3x20s          Levator s reviewd reviewed 3x20s          ltr 5\"x20            Piriformis s 20sx3                                                                Ther Activity                                       Gait Training                                       Modalities                                                "

## 2024-01-23 ENCOUNTER — OFFICE VISIT (OUTPATIENT)
Dept: PHYSICAL THERAPY | Facility: REHABILITATION | Age: 74
End: 2024-01-23
Payer: MEDICARE

## 2024-01-23 DIAGNOSIS — M54.2 NECK PAIN: Primary | ICD-10-CM

## 2024-01-23 PROCEDURE — 97140 MANUAL THERAPY 1/> REGIONS: CPT | Performed by: PHYSICAL THERAPIST

## 2024-01-23 PROCEDURE — 97110 THERAPEUTIC EXERCISES: CPT | Performed by: PHYSICAL THERAPIST

## 2024-01-23 NOTE — PROGRESS NOTES
"Daily Note     Today's date: 2024  Patient name: Jacobo Bliss  : 1950  MRN: [id number]  Referring provider: Jose Luis Garcia MD  Dx:   Encounter Diagnosis     ICD-10-CM    1. Neck pain  M54.2                      Subjective: patient states his neck has been better over the last week or 2      Objective: See treatment diary below      Assessment: Tolerated treatment well. Patient demonstrated fatigue post treatment, exhibited good technique with therapeutic exercises, and would benefit from continued PT Patient with his best day since starting PT. He has had less pain over the last week or 2      Plan: Continue per plan of care.      Precautions:       Manuals 1/4 1/8 1/10 1/23         STM done done done done                                                Neuro Re-Ed             row   discussed reviewed         Low row                                                                              Ther Ex             UT s reviewed reviewed 3x20s 3x20s         Levator s reviewd reviewed 3x20s 3x20s         ltr 5\"x20            Piriformis s 20sx3                                                                Ther Activity                                       Gait Training                                       Modalities                                                  "

## 2024-01-26 ENCOUNTER — OFFICE VISIT (OUTPATIENT)
Dept: PHYSICAL THERAPY | Facility: REHABILITATION | Age: 74
End: 2024-01-26
Payer: MEDICARE

## 2024-01-26 DIAGNOSIS — M54.2 NECK PAIN: Primary | ICD-10-CM

## 2024-01-26 PROCEDURE — 97110 THERAPEUTIC EXERCISES: CPT | Performed by: PHYSICAL THERAPIST

## 2024-01-26 PROCEDURE — 97140 MANUAL THERAPY 1/> REGIONS: CPT | Performed by: PHYSICAL THERAPIST

## 2024-01-26 NOTE — PROGRESS NOTES
"Daily Note     Today's date: 2024  Patient name: Jacobo Bliss  : 1950  MRN: [id number]  Referring provider: Jose Luis Garcia MD  Dx:   Encounter Diagnosis     ICD-10-CM    1. Neck pain  M54.2                      Subjective: patient states he has had headaches since       Objective: See treatment diary below      Assessment: Tolerated treatment well. Patient demonstrated fatigue post treatment, exhibited good technique with therapeutic exercises, and would benefit from continued PT Patient with increased tightness in subocipital muscles leading to increased frequency of headaches over the last couple of days      Plan: Continue per plan of care.      Precautions:       Manuals 1/4 1/8 1/10 1/23 1/26        STM done done done done done                                               Neuro Re-Ed             row   discussed reviewed reviewed        Low row                                                                              Ther Ex             UT s reviewed reviewed 3x20s 3x20s 3x20s        Levator s reviewd reviewed 3x20s 3x20s 3x20s        ltr 5\"x20            Piriformis s 20sx3                                                                Ther Activity                                       Gait Training                                       Modalities                                                    "

## 2024-02-01 ENCOUNTER — OFFICE VISIT (OUTPATIENT)
Dept: PHYSICAL THERAPY | Facility: REHABILITATION | Age: 74
End: 2024-02-01
Payer: MEDICARE

## 2024-02-01 DIAGNOSIS — M54.2 NECK PAIN: Primary | ICD-10-CM

## 2024-02-01 PROCEDURE — 97110 THERAPEUTIC EXERCISES: CPT | Performed by: PHYSICAL THERAPIST

## 2024-02-01 PROCEDURE — 97140 MANUAL THERAPY 1/> REGIONS: CPT | Performed by: PHYSICAL THERAPIST

## 2024-02-01 NOTE — PROGRESS NOTES
"Daily Note     Today's date: 2024  Patient name: Jacobo Bliss  : 1950  MRN: [id number]  Referring provider: Jose Luis Garcia MD  Dx:   Encounter Diagnosis     ICD-10-CM    1. Neck pain  M54.2                      Subjective: patient states he only had a headache on Tuesday this week otherwise he had a good week      Objective: See treatment diary below      Assessment: Tolerated treatment well. Patient demonstrated fatigue post treatment, exhibited good technique with therapeutic exercises, and would benefit from continued PT Patient with improving tightness of suboccipitals. Patient with less frequency of headaches. Patient would like to trial cold laser      Plan: Continue per plan of care.      Precautions:       Manuals 1/4 1/8 1/10 1/23 1/26 2/1       STM (UT, paraspinals, subocciptals) done done done done done done       SOR      done       Cervical traction      done                    Neuro Re-Ed             row   discussed reviewed reviewed        Low row                                                                              Ther Ex             UT s reviewed reviewed 3x20s 3x20s 3x20s 3x20s       Levator s reviewd reviewed 3x20s 3x20s 3x20s 3x20s       ltr 5\"x20            Piriformis s 20sx3                                                                Ther Activity                                       Gait Training                                       Modalities                                                      "

## 2024-02-06 NOTE — PROGRESS NOTES
PT Evaluation     Today's date: 2024  Patient name: Jacobo Bliss  : 1950  MRN: [id number]  Referring provider: Jose Luis Garcia MD  Dx:   Encounter Diagnosis     ICD-10-CM    1. Neck pain  M54.2       2. Headache in back of head  R51.9                      Assessment  Assessment details: Jacobo Bliss is a 73 y.o. male who presents with signs and symptoms consistent of persistent neck pain with posterior headaches. Pt has been experiencing headaches for 3-4 years; however, symptoms have been progressively worsening. Patient presents with bilateral neck and suboccipital  pain, decreased neck ROM, joint mobility restrictions (upper c/s), deconditioning, and poor motor control. Due to these impairments, Patient has difficulty performing prolonged sitting, lifting, reaching , driving, turning head , looking up , and pushing/pulling. Patient would benefit from skilled physical therapy to address the impairments, improve their level of function, and to improve their overall quality of life.    Primary movement impairments:   1) Decreased OA and AA joint mobility and ROM   2) Decreased c/s ROM  3) Peripheral sensitization to the neck and UQ     Impairments: abnormal muscle firing, abnormal muscle tone, abnormal or restricted ROM, activity intolerance, lacks appropriate home exercise program, pain with function, poor posture  and poor body mechanics    Goals  Short Term Goals: to be achieved by 4 weeks  1) Patient to be independent with basic HEP.  2) Decrease pain to 3/10 at its worst.  3) Increase cervical spine ROM by 5-10 degrees in all deficient planes.  4) Increase UE strength by 1/2 MMT grade in all deficient planes.  5) Improve joint mobility in cervical spine to normal     Long Term Goals: to be achieved by discharge  1) FOTO equal to or greater than expected.  2) Patient to be independent with comprehensive HEP.  3) Cervical spine ROM WNL all planes to improve a/iadls.  4) Increase UE strength to 1 MMT  grade in all planes to improve a/iadls.  5) Reduce headache intensity by 50%      Plan  Patient would benefit from: PT eval and skilled physical therapy  Planned modality interventions: low level laser therapy  Planned therapy interventions: manual therapy, neuromuscular re-education, therapeutic activities, therapeutic exercise and home exercise program  Frequency: 2x per week for 8 weeks.  Treatment plan discussed with: patient      Subjective Evaluation    History of Present Illness  Mechanism of injury: History of Current Injury: Pt transferred to [place] for neck pain and headaches from our [place] PT office. Patient requested use of low level laser therapy with treatment. Pt reports increased frequency of headaches which prompted his decision to seek treatment at our [place] office. Pt has been attending PT of 1 month. However, patient was previously in PT from Sept- Nov of last year. Pt sees a chiro on a regular basis. Pt reports experiencing headaches for 3-4 years. They seem to be worsening.   Headache frequency: No rhyme or reason when it happens.   Pain location/Descriptors: P1: Pain in suboccipital region. P2: pain from upper trap to shoulders   Aggravating factors: Sleeping, driving   Easing factors: Advil helps P2 but not P1  24 HR pattern: Worsen in AM. Pt gets very little sleep at night time.  Pt does take a nap.   Imaging: MRI: 1. Multilevel disc degeneration and cervical spondylosis, greatest at C4-5 and   C5-6.   2. At C4-5, disc ridge complex with uncovertebral arthropathy on the right side   and small central disc herniation. There is mild impression of the ventral   thecal sac with moderate to severe right and mild left neural foraminal   narrowing.   3. At C5-6, disc ridge complex with uncovertebral arthropathy. There is mild   impression of the ventral thecal sac with severe bilateral neural foraminal   narrowing.     Special Questions: Denies any N&T in face are arms. Denies ataxia or balance  "issues, dysphagia, dysarthria, drop attacks.   Patient goals:  Help manage symptoms.   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     Pt does admit to having anxiety. Pt does take lexopro.   Pt has a wife who has MS and is a caretaker  Has children and grandchildren.   PMH significant for right foot surgery: Removal of bone spurs.   Patient Goals  Patient goals for therapy: increased strength, decreased pain, increased motion and independence with ADLs/IADLs    Pain  Pain scale: Mild clench in neck.  At worst pain ratin      Diagnostic Tests  MRI studies: normal  Treatments  Current treatment: chiropractic, medication and physical therapy      Objective     Active Range of Motion   Cervical/Thoracic Spine       Cervical  Subcranial protraction:  WFL   Subcranial retraction: Active cervical subcranial retraction: Unable to get head over shoulders.   Restriction level: maximal  Flexion: 70 degrees  with pain  Extension: 30 degrees     with pain  Left lateral flexion: 25 degrees      Right lateral flexion: 25 degrees      Left rotation: 54 degrees with pain  Right rotation: 52 degrees    with pain    Tests     Additional Tests Details  AA: L: 25 deg, R:45 deg  AA joint mobility: hypomobile  OA joint mobility: hypomobile    Latera glides: WNL  Rotational glides: hypomobile throughout- worse upper c/s    Tenderness in paraspinals and SHAILESH region             Precautions: Skin CA, Anxiety, DDD      Manuals             IASTM using LLL 4' to c/s paraspinas + SHAILESH            Rotational glides of C/S             OA mobs             AA mobs             Neuro Re-Ed             C/s retraction             DNF activation              Scap setting with foam roller                                                                 Ther Ex             Cervical rotational snag 10x3\"B             3 finger rotation for Upper c/s rotation             T/S extension c/ foam roller               "                                                                Ther Activity                                       Gait Training                                       Modalities

## 2024-02-08 ENCOUNTER — OFFICE VISIT (OUTPATIENT)
Dept: PHYSICAL THERAPY | Facility: CLINIC | Age: 74
End: 2024-02-08
Payer: MEDICARE

## 2024-02-08 DIAGNOSIS — R51.9 HEADACHE IN BACK OF HEAD: ICD-10-CM

## 2024-02-08 DIAGNOSIS — M54.2 NECK PAIN: Primary | ICD-10-CM

## 2024-02-08 PROCEDURE — 97110 THERAPEUTIC EXERCISES: CPT | Performed by: PHYSICAL THERAPIST

## 2024-02-08 PROCEDURE — 97162 PT EVAL MOD COMPLEX 30 MIN: CPT | Performed by: PHYSICAL THERAPIST

## 2024-02-08 NOTE — LETTER
2024    Jose Luis Garcia MD   [facility]  [address]  [facility] [address]    Patient: Jacobo Bliss   YOB: 1950   Date of Visit: 2024     Encounter Diagnosis     ICD-10-CM    1. Neck pain  M54.2       2. Headache in back of head  R51.9           Dear Dr. Garcia:    Thank you for your recent referral of Jacobo Bliss. Please review the attached evaluation summary from Jacobo's recent visit.     Please verify that you agree with the plan of care by signing the attached order.     If you have any questions or concerns, please do not hesitate to call.     I sincerely appreciate the opportunity to share in the care of one of your patients and hope to have another opportunity to work with you in the near future.       Sincerely,    Hany Guzman, PT      Referring Provider:      I certify that I have read the below Plan of Care and certify the need for these services furnished under this plan of treatment while under my care.                    Jose Luis Garcia MD   [facility]  [address]  [facility] [address]  Via Fax: [phone]          PT Evaluation     Today's date: 2024  Patient name: Jacobo Bliss  : 1950  MRN: [id number]  Referring provider: Jose Luis Garcia MD  Dx:   Encounter Diagnosis     ICD-10-CM    1. Neck pain  M54.2       2. Headache in back of head  R51.9                      Assessment  Assessment details: Jacobo Bliss is a 73 y.o. male who presents with signs and symptoms consistent of persistent neck pain with posterior headaches. Pt has been experiencing headaches for 3-4 years; however, symptoms have been progressively worsening. Patient presents with bilateral neck and suboccipital  pain, decreased neck ROM, joint mobility restrictions (upper c/s), deconditioning, and poor motor control. Due to these impairments, Patient has difficulty performing prolonged sitting, lifting, reaching , driving, turning head , looking up , and pushing/pulling. Patient  would benefit from skilled physical therapy to address the impairments, improve their level of function, and to improve their overall quality of life.    Primary movement impairments:   1) Decreased OA and AA joint mobility and ROM   2) Decreased c/s ROM  3) Peripheral sensitization to the neck and UQ     Impairments: abnormal muscle firing, abnormal muscle tone, abnormal or restricted ROM, activity intolerance, lacks appropriate home exercise program, pain with function, poor posture  and poor body mechanics    Goals  Short Term Goals: to be achieved by 4 weeks  1) Patient to be independent with basic HEP.  2) Decrease pain to 3/10 at its worst.  3) Increase cervical spine ROM by 5-10 degrees in all deficient planes.  4) Increase UE strength by 1/2 MMT grade in all deficient planes.  5) Improve joint mobility in cervical spine to normal     Long Term Goals: to be achieved by discharge  1) FOTO equal to or greater than expected.  2) Patient to be independent with comprehensive HEP.  3) Cervical spine ROM WNL all planes to improve a/iadls.  4) Increase UE strength to 1 MMT grade in all planes to improve a/iadls.  5) Reduce headache intensity by 50%      Plan  Patient would benefit from: PT eval and skilled physical therapy  Planned modality interventions: low level laser therapy  Planned therapy interventions: manual therapy, neuromuscular re-education, therapeutic activities, therapeutic exercise and home exercise program  Frequency: 2x per week for 8 weeks.  Treatment plan discussed with: patient      Subjective Evaluation    History of Present Illness  Mechanism of injury: History of Current Injury: Pt transferred to [place] for neck pain and headaches from our [place] PT office. Patient requested use of low level laser therapy with treatment. Pt reports increased frequency of headaches which prompted his decision to seek treatment at our [place] office. Pt has been attending PT of 1 month. However, patient was  previously in PT from Sept- Nov of last year. Pt sees a chiro on a regular basis. Pt reports experiencing headaches for 3-4 years. They seem to be worsening.   Headache frequency: No rhyme or reason when it happens.   Pain location/Descriptors: P1: Pain in suboccipital region. P2: pain from upper trap to shoulders   Aggravating factors: Sleeping, driving   Easing factors: Advil helps P2 but not P1  24 HR pattern: Worsen in AM. Pt gets very little sleep at night time.  Pt does take a nap.   Imaging: MRI: 1. Multilevel disc degeneration and cervical spondylosis, greatest at C4-5 and   C5-6.   2. At C4-5, disc ridge complex with uncovertebral arthropathy on the right side   and small central disc herniation. There is mild impression of the ventral   thecal sac with moderate to severe right and mild left neural foraminal   narrowing.   3. At C5-6, disc ridge complex with uncovertebral arthropathy. There is mild   impression of the ventral thecal sac with severe bilateral neural foraminal   narrowing.     Special Questions: Denies any N&T in face are arms. Denies ataxia or balance issues, dysphagia, dysarthria, drop attacks.   Patient goals:  Help manage symptoms.   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     Pt does admit to having anxiety. Pt does take lexopro.   Pt has a wife who has MS and is a caretaker  Has children and grandchildren.   PMH significant for right foot surgery: Removal of bone spurs.   Patient Goals  Patient goals for therapy: increased strength, decreased pain, increased motion and independence with ADLs/IADLs    Pain  Pain scale: Mild clench in neck.  At worst pain ratin      Diagnostic Tests  MRI studies: normal  Treatments  Current treatment: chiropractic, medication and physical therapy      Objective     Active Range of Motion   Cervical/Thoracic Spine       Cervical  Subcranial protraction:  WFL   Subcranial retraction: Active cervical  "subcranial retraction: Unable to get head over shoulders.   Restriction level: maximal  Flexion: 70 degrees  with pain  Extension: 30 degrees     with pain  Left lateral flexion: 25 degrees      Right lateral flexion: 25 degrees      Left rotation: 54 degrees with pain  Right rotation: 52 degrees    with pain    Tests     Additional Tests Details  AA: L: 25 deg, R:45 deg  AA joint mobility: hypomobile  OA joint mobility: hypomobile    Latera glides: WNL  Rotational glides: hypomobile throughout- worse upper c/s    Tenderness in paraspinals and SHAILESH region             Precautions: Skin CA, Anxiety, DDD      Manuals 2/8            IASTM using LLL 4' to c/s paraspinas + SHAILESH            Rotational glides of C/S             OA mobs             AA mobs             Neuro Re-Ed             C/s retraction             DNF activation              Scap setting with foam roller                                                                 Ther Ex             Cervical rotational snag 10x3\"B             3 finger rotation for Upper c/s rotation             T/S extension c/ foam roller                                                                              Ther Activity                                       Gait Training                                       Modalities                                                              "

## 2024-02-15 ENCOUNTER — OFFICE VISIT (OUTPATIENT)
Dept: PHYSICAL THERAPY | Facility: CLINIC | Age: 74
End: 2024-02-15
Payer: MEDICARE

## 2024-02-15 DIAGNOSIS — R51.9 HEADACHE IN BACK OF HEAD: ICD-10-CM

## 2024-02-15 DIAGNOSIS — M54.2 NECK PAIN: Primary | ICD-10-CM

## 2024-02-15 PROCEDURE — 97112 NEUROMUSCULAR REEDUCATION: CPT | Performed by: PHYSICAL THERAPIST

## 2024-02-15 PROCEDURE — 97140 MANUAL THERAPY 1/> REGIONS: CPT | Performed by: PHYSICAL THERAPIST

## 2024-02-15 PROCEDURE — 97110 THERAPEUTIC EXERCISES: CPT | Performed by: PHYSICAL THERAPIST

## 2024-02-15 NOTE — PROGRESS NOTES
"Daily Note     Today's date: 2/15/2024  Patient name: Jacobo Bliss  : 1950  MRN: [id number]  Referring provider: Jose Luis Garcia MD  Dx:   Encounter Diagnosis     ICD-10-CM    1. Neck pain  M54.2       2. Headache in back of head  R51.9                      Subjective: Pt denies and new complaints this morning. He states that he was pretty good since last visit. Had one headache that lasted 24 hours.       Objective: See treatment diary below      Assessment: Tolerated treatment well. Focused intervention on primary movement impairments. Patient exhibited good technique with therapeutic exercises and would benefit from continued PT.      Plan: Continue per plan of care.      Precautions: Skin CA, Anxiety, DDD      Manuals 2/8 2/15           IASTM using LLL 4' to c/s paraspinas + SHAILESH 4' to c/s paraspinas + SHAILESH           Rotational glides of C/S  Gr II           OA mobs  Gr II           AA mobs  10x ea           Neuro Re-Ed             C/s retraction  15x3\"           DNF activation              Scap setting with foam roller  2x10 3\"                                                               Ther Ex             Cervical rotational snag 10x3\"B  10x5\"B            3 finger rotation for Upper c/s rotation             T/S extension c/ foam roller  1x15 3\"            UBE  3'/3'                                                               Ther Activity                                       Gait Training                                       Modalities               10'                          1:1 with PT from [id number]      "

## 2024-02-16 ENCOUNTER — OFFICE VISIT (OUTPATIENT)
Dept: PHYSICAL THERAPY | Facility: CLINIC | Age: 74
End: 2024-02-16
Payer: MEDICARE

## 2024-02-16 DIAGNOSIS — M54.2 NECK PAIN: ICD-10-CM

## 2024-02-16 DIAGNOSIS — R51.9 HEADACHE IN BACK OF HEAD: Primary | ICD-10-CM

## 2024-02-16 PROCEDURE — 97140 MANUAL THERAPY 1/> REGIONS: CPT | Performed by: PHYSICAL THERAPIST

## 2024-02-16 PROCEDURE — 97112 NEUROMUSCULAR REEDUCATION: CPT | Performed by: PHYSICAL THERAPIST

## 2024-02-16 PROCEDURE — 97110 THERAPEUTIC EXERCISES: CPT | Performed by: PHYSICAL THERAPIST

## 2024-02-16 NOTE — PROGRESS NOTES
"Daily Note     Today's date: 2024  Patient name: Jacobo Bliss  : 1950  MRN: [id number]  Referring provider: Jose Luis Garcia MD  Dx:   Encounter Diagnosis     ICD-10-CM    1. Headache in back of head  R51.9       2. Neck pain  M54.2                      Subjective: Pt reports having a headache this morning. Located at the base of the neck to the occiput and temporalis. Pt tried to take is regular headache medication without effectiveness. Also notes trying certain exercises with limited help      Objective: See treatment diary below      Assessment: Tolerated treatment well. Implemented soft tissue mobilizations to temporalis. Performed self OA mobilizations. Discussed the importance of graded movement/exposure vs having immediate effect. Also discussed  adverse effects of sedentary positions and how this can play a role in pain state. Pt should consider consulting with neurology secondary to persistent headaches. He never consulted neurology despite persistent headaches. Patient would benefit from continued PT.      Plan: Continue per plan of care.      Precautions: Skin CA, Anxiety, DDD      Manuals 2/8 2/15 2/16          IASTM using LLL 4' to c/s paraspinas + SHAILESH 4' to c/s paraspinas + SHAILESH 4' to c/s paraspinas + SHAILESH          Rotational glides of C/S  Gr II Gr II          OA mobs  Gr II           AA mobs  10x ea           STM to SHAILESH, paraspinals, Temporalis   6'          Neuro Re-Ed             C/s retraction  15x3\" 10x3\"           DNF activation              Scap setting with foam roller  2x10 3\"                                                               Ther Ex             Cervical rotational snag 10x3\"B  10x5\"B            3 finger rotation for Upper c/s rotation             T/S extension c/ foam roller  1x15 3\"            UBE  3'/3' 3'/3'          Self OA mobs   10x ea for 3\"                                                 Ther Activity                                       Education                " 10'           Gait Training                                       Modalities               10'                          1:1 with PT from [id number]

## 2024-02-20 ENCOUNTER — APPOINTMENT (OUTPATIENT)
Dept: PHYSICAL THERAPY | Facility: CLINIC | Age: 74
End: 2024-02-20
Payer: MEDICARE

## 2024-02-23 ENCOUNTER — OFFICE VISIT (OUTPATIENT)
Dept: PHYSICAL THERAPY | Facility: CLINIC | Age: 74
End: 2024-02-23
Payer: MEDICARE

## 2024-02-23 ENCOUNTER — APPOINTMENT (OUTPATIENT)
Dept: PHYSICAL THERAPY | Facility: CLINIC | Age: 74
End: 2024-02-23
Payer: MEDICARE

## 2024-02-23 DIAGNOSIS — R51.9 HEADACHE IN BACK OF HEAD: Primary | ICD-10-CM

## 2024-02-23 DIAGNOSIS — M54.2 NECK PAIN: ICD-10-CM

## 2024-02-23 PROCEDURE — 97110 THERAPEUTIC EXERCISES: CPT | Performed by: PHYSICAL THERAPIST

## 2024-02-23 PROCEDURE — 97140 MANUAL THERAPY 1/> REGIONS: CPT | Performed by: PHYSICAL THERAPIST

## 2024-02-23 NOTE — PROGRESS NOTES
"Daily Note     Today's date: 2024  Patient name: Jacobo Bliss  : 1950  MRN: [id number]  Referring provider: Jose Luis Garcia MD  Dx:   Encounter Diagnosis     ICD-10-CM    1. Headache in back of head  R51.9       2. Neck pain  M54.2           Start Time: 0815  Stop Time: 0900  Total time in clinic (min): 45 minutes    Subjective: Pt missed last treatment secondary to Bell's Palsy. Pt started to notice weakness in L eye lid and facial drop. Went to ED and had medical work up. Pt notes that medical work up with negative for CVA or TIA and likely viral related. Pt was taking prednisone and anti-viral. He reports improving neck pain and headache. Although he still has left sided headache at base of neck in suboccipital region.  Pt called to schedule with [facility] neurology. Awaiting call back at this time to schedule.       Objective: See treatment diary below      Assessment: Tolerated treatment well. Pt fairly tender to L SCM and insertion on mastoid process. Implemented SCM stretching into POC. Patient exhibited good technique with therapeutic exercises and would benefit from continued PT.       Plan: Continue per plan of care. Recommend point stimulator treatment for Bell's Palsy. Pt would like to wait.      Precautions: Skin CA, Anxiety, DDD      Manuals 2/8 2/15 2/16 2/23         IASTM using LLL 4' to c/s paraspinas + SHAILESH 4' to c/s paraspinas + SHAILESH 4' to c/s paraspinas + SHAILESH 3' L cervical spine region and SHAILESH         Rotational glides of C/S  Gr II Gr II Gr III         OA mobs  Gr II           AA mobs  10x ea           STM to SHAILESH, paraspinals, Temporalis   6' 8'' + SCM insertion         Neuro Re-Ed             C/s retraction  15x3\" 10x3\"           DNF activation              Scap setting with foam roller  2x10 3\"                                                               Ther Ex             Cervical rotational snag 10x3\"B  10x5\"B            3 finger rotation for Upper c/s rotation    10x ea x 5\"     " "    T/S extension c/ foam roller  1x15 3\"            UBE  3'/3' 3'/3' 3'/3'         Self OA mobs   10x ea for 3\" 10x ea for 3\"         L SCM stretch    10x5\"                                    Ther Activity                                       Education                10'           Gait Training                                       Modalities               10'                          1:1 with PT from 075-9         "

## 2024-02-27 ENCOUNTER — APPOINTMENT (OUTPATIENT)
Dept: PHYSICAL THERAPY | Facility: CLINIC | Age: 74
End: 2024-02-27
Payer: MEDICARE

## 2024-02-27 ENCOUNTER — OFFICE VISIT (OUTPATIENT)
Dept: PHYSICAL THERAPY | Facility: CLINIC | Age: 74
End: 2024-02-27
Payer: MEDICARE

## 2024-02-27 DIAGNOSIS — M54.2 NECK PAIN: ICD-10-CM

## 2024-02-27 DIAGNOSIS — R51.9 HEADACHE IN BACK OF HEAD: Primary | ICD-10-CM

## 2024-02-27 PROCEDURE — 97110 THERAPEUTIC EXERCISES: CPT | Performed by: PHYSICAL THERAPIST

## 2024-02-27 PROCEDURE — 97140 MANUAL THERAPY 1/> REGIONS: CPT | Performed by: PHYSICAL THERAPIST

## 2024-02-29 ENCOUNTER — OFFICE VISIT (OUTPATIENT)
Dept: PHYSICAL THERAPY | Facility: CLINIC | Age: 74
End: 2024-02-29
Payer: MEDICARE

## 2024-02-29 DIAGNOSIS — G51.0 BELL'S PALSY: Primary | ICD-10-CM

## 2024-02-29 PROCEDURE — 97162 PT EVAL MOD COMPLEX 30 MIN: CPT

## 2024-02-29 PROCEDURE — 97112 NEUROMUSCULAR REEDUCATION: CPT

## 2024-02-29 PROCEDURE — 97140 MANUAL THERAPY 1/> REGIONS: CPT

## 2024-02-29 NOTE — PROGRESS NOTES
PT Direct Access Evaluation    Today's date: 2024  Patient name: Jacobo Bliss  : 1950  MRN: [id number]  Referring provider: Jose Luis Garcia MD  Dx:   Encounter Diagnosis     ICD-10-CM    1. Bell's palsy  G51.0                      Assessment  Assessment details: Jacobo Bliss is a 73 y.o. male who presents with signs and symptoms consistent of referring diagnosis based off of subjective/objective findings. Patient presents with decreased strength and decreased ROM of the facial musculature with mild to moderate muscular contraction noted on the L side of the face. Sensation intact. Oculomotor exam/UQS unremarkable. Due to these impairments, Patient has difficulty performing many of his daily activities which involve the face such as eating, drinking, smiling and blinking as well as increases in pain which limits his daily functional capabilities. Patient would benefit from a comprehensive HEP focusing on improving said deficits. Patient was educated on and provided with an at home exercise program this date to be completed as well as prognosis. Encouraged patient to continue to use an eye patch to minimize drying as well as protect his eye from irritants. Patient verbalized understanding of the importance of completion. Patient would benefit from skilled physical therapy to address the impairments, improve their level of function, and to improve their overall quality of life. PT POC 1-2x/wk for 6 weeks. Thank you for this referral.       Impairments: abnormal or restricted ROM, impaired physical strength, lacks appropriate home exercise program and pain with function    Symptom irritability: moderateUnderstanding of Dx/Px/POC: good   Prognosis: good    Goals  Goals  Short Term Goals: to be achieved by 4 weeks  1) Patient to be independent with basic HEP.  2) Patient will be able to achieve some muscle contraction of the eyebrows on the R side of his face  3) Patient will be able to pucker his lips   4)  Patient will be able to equally puff his cheeks     Long Term Goals: to be achieved by discharge  1) FOTO equal to or greater than expected outcome.  2) Patient to be independent with comprehensive HEP.  3) Patient will be able to smile  4) Patient will be able to furrow his eyebrow  5) Patient will be able to raise his eyebrows       Plan  Patient would benefit from: PT eval and skilled physical therapy  Planned modality interventions: point stimulation.  Planned therapy interventions: manual therapy, neuromuscular re-education, strengthening, therapeutic activities, therapeutic exercise, functional ROM exercises, home exercise program and activity modification  Frequency: 1-2.  Duration in weeks: 6  Treatment plan discussed with: patient        Subjective Evaluation    History of Present Illness  Mechanism of injury: History of Current Injury: Patient notes a recent history of Bell's Palsy which began on 2/18/24 which resulted in a subsequent ER visit after patient started to note facial droop and paralysis on the L side of the face. He was given prednisone as well as other medication which ceased this past Tuesday. Since the initial injury, patient has had some minor improvements in facial movement. He does note increases in pain in the L side of the face with increased pain in the back of his head which he describes as a throbbing/burning which is constant and increases when medication wears off. He also finds increased pain near the ear which radiates into the eyebrow and underneath the jaw. Blurriness was noted in the L eye initially although this has been steadily improving. He has been wearing an eye patch to minimize the risk of eye injury. Currently, he is being treated by OPPT for tension headaches.    Pain location/Descriptors: See above   Aggravating factors: Yawning, massage, nothing   Easing factors: Medication   24 HR pattern: none   Imaging: Yes   Special Questions: Denies N/T, weakness in the UE ,  dysphagia, diplopia, dysarthria   Patient goals:  improve pain, decrease facial droop  Occupation: Retired banker      Quality of life: good    Patient Goals  Patient goals for therapy: decreased pain, increased motion and increased strength    Pain  Current pain ratin  At best pain ratin  At worst pain ratin  Quality: sharp and throbbing    Treatments  Current treatment: physical therapy      Objective     Neurological Testing     Sensation   Cervical/Thoracic   Left   Intact: light touch    Right   Intact: light touch    Reflexes   Left   Biceps (C5/C6): normal (2+)  Brachioradialis (C6): normal (2+)  Triceps (C7): normal (2+)  Edwards's reflex: negative    Right   Biceps (C5/C6): normal (2+)  Brachioradialis (C6): normal (2+)  Triceps (C7): normal (2+)  Edwards's reflex: negative    Strength/Myotome Testing   Cervical Spine     Left   Interossei strength (t1): 5  Neck lateral flexion (C3): 5    Right   Interossei strength (t1): 5  Neck lateral flexion (C3): 5    Left Shoulder     Planes of Motion   Abduction: 5   External rotation at 0°: 5     Right Shoulder     Planes of Motion   Abduction: 5   External rotation at 0°: 5     Left Elbow   Flexion: 5  Extension: 5    Right Elbow   Flexion: 5  Extension: 5    Left Wrist/Hand   Wrist extension: 5  Wrist flexion: 5  Thumb extension: 5    Right Wrist/Hand   Wrist extension: 5  Wrist flexion: 5  Thumb extension: 5    General Comments:      Cervical/Thoracic Comments  Oculomotor exam: intact, no abnormalities noted     Peripheral vision: intact and symmetrical B/L     Sensation: Diminished along facial nerve on the L side of the face; WNL on the R side      Eyebrow raise: Mild contraction on the L 4/5; Normal on the R      Cheek pucker: Able to pucker both sides with more noted R>L although there is mild contraction on the L.      Cheek blowout: able to close mouth but unable to control and blow air into the L cheek 3+/5     Smile: Mild contraction noted on  the L 3+/5; Normal on the R      Frown: Unable to complete or contract on the L with minimal muscular control 3/5; Normal on the R      Platysma: Unable to contract on the L; Full, normal contraction on the R      [place]: DNP     Eye Closure: Unable to complete on L with mild ability to contract 3+/5; normal on R     Eyebrow Furrow: Normal on the R; Mild contraction on L 3+/5    TTP noted along the posterior occiput, jawline (on the L), near facial nerve on the L as well                  Diagnosis: Bell's palsy    Precautions:    POC Expires:   Re-evaluation Date:    FOTO Scores/Date: Goal -;    Visit Count 6/10       Manuals 1/31       Point Stimulator  EB mouth (2); eye (2); eyebrow lateral and medial (2); near ear (2)       Re-eval                 Ther Ex                                                                                Neuro Re-Ed        Cheek puff        Pucker HEP       Eye brow raise HEP       Face scrunch         Eye squeeze HEP       Platysma scrunch         Duck face  HEP       Shocked face        Wide eye open          Ther Act                                                                   Modalities

## 2024-02-29 NOTE — LETTER
2024    Jose Luis Garcia MD   [facility]  [address]  [facility] [address]    Patient: Jacobo Bliss   YOB: 1950   Date of Visit: 2024     Encounter Diagnosis     ICD-10-CM    1. Bell's palsy  G51.0           Dear Dr. Garcia:    Thank you for your recent referral of Jacobo Bliss. Please review the attached evaluation summary from Jacobo's recent visit.     Please verify that you agree with the plan of care by signing the attached order.     If you have any questions or concerns, please do not hesitate to call.     I sincerely appreciate the opportunity to share in the care of one of your patients and hope to have another opportunity to work with you in the near future.       Sincerely,    Daljit Cage, PT      Referring Provider:      I certify that I have read the below Plan of Care and certify the need for these services furnished under this plan of treatment while under my care.                    Jose Luis Garcia MD   [facility]  [address]  [facility] [address]  Via Fax: [phone]          PT Direct Access Evaluation    Today's date: 2024  Patient name: Jacobo Bliss  : 1950  MRN: [id number]  Referring provider: Jose Luis Garcia MD  Dx:   Encounter Diagnosis     ICD-10-CM    1. Bell's palsy  G51.0                      Assessment  Assessment details: Jacobo Bliss is a 73 y.o. male who presents with signs and symptoms consistent of referring diagnosis based off of subjective/objective findings. Patient presents with decreased strength and decreased ROM of the facial musculature with mild to moderate muscular contraction noted on the L side of the face. Sensation intact. Oculomotor exam/UQS unremarkable. Due to these impairments, Patient has difficulty performing many of his daily activities which involve the face such as eating, drinking, smiling and blinking as well as increases in pain which limits his daily functional capabilities. Patient would benefit from a comprehensive  HEP focusing on improving said deficits. Patient was educated on and provided with an at home exercise program this date to be completed as well as prognosis. Encouraged patient to continue to use an eye patch to minimize drying as well as protect his eye from irritants. Patient verbalized understanding of the importance of completion. Patient would benefit from skilled physical therapy to address the impairments, improve their level of function, and to improve their overall quality of life. PT POC 1-2x/wk for 6 weeks. Thank you for this referral.       Impairments: abnormal or restricted ROM, impaired physical strength, lacks appropriate home exercise program and pain with function    Symptom irritability: moderateUnderstanding of Dx/Px/POC: good   Prognosis: good    Goals  Goals  Short Term Goals: to be achieved by 4 weeks  1) Patient to be independent with basic HEP.  2) Patient will be able to achieve some muscle contraction of the eyebrows on the R side of his face  3) Patient will be able to pucker his lips   4) Patient will be able to equally puff his cheeks     Long Term Goals: to be achieved by discharge  1) FOTO equal to or greater than expected outcome.  2) Patient to be independent with comprehensive HEP.  3) Patient will be able to smile  4) Patient will be able to furrow his eyebrow  5) Patient will be able to raise his eyebrows       Plan  Patient would benefit from: PT eval and skilled physical therapy  Planned modality interventions: point stimulation.  Planned therapy interventions: manual therapy, neuromuscular re-education, strengthening, therapeutic activities, therapeutic exercise, functional ROM exercises, home exercise program and activity modification  Frequency: 1-2.  Duration in weeks: 6  Treatment plan discussed with: patient        Subjective Evaluation    History of Present Illness  Mechanism of injury: History of Current Injury: Patient notes a recent history of Bell's Palsy which  began on 24 which resulted in a subsequent ER visit after patient started to note facial droop and paralysis on the L side of the face. He was given prednisone as well as other medication which ceased this past Tuesday. Since the initial injury, patient has had some minor improvements in facial movement. He does note increases in pain in the L side of the face with increased pain in the back of his head which he describes as a throbbing/burning which is constant and increases when medication wears off. He also finds increased pain near the ear which radiates into the eyebrow and underneath the jaw. Blurriness was noted in the L eye initially although this has been steadily improving. He has been wearing an eye patch to minimize the risk of eye injury. Currently, he is being treated by OPPT for tension headaches.    Pain location/Descriptors: See above   Aggravating factors: Yawning, massage, nothing   Easing factors: Medication   24 HR pattern: none   Imaging: Yes   Special Questions: Denies N/T, weakness in the UE , dysphagia, diplopia, dysarthria   Patient goals:  improve pain, decrease facial droop  Occupation: Retired banker      Quality of life: good    Patient Goals  Patient goals for therapy: decreased pain, increased motion and increased strength    Pain  Current pain ratin  At best pain ratin  At worst pain ratin  Quality: sharp and throbbing    Treatments  Current treatment: physical therapy      Objective     Neurological Testing     Sensation   Cervical/Thoracic   Left   Intact: light touch    Right   Intact: light touch    Reflexes   Left   Biceps (C5/C6): normal (2+)  Brachioradialis (C6): normal (2+)  Triceps (C7): normal (2+)  Edwards's reflex: negative    Right   Biceps (C5/C6): normal (2+)  Brachioradialis (C6): normal (2+)  Triceps (C7): normal (2+)  Edwards's reflex: negative    Strength/Myotome Testing   Cervical Spine     Left   Interossei strength (t1): 5  Neck lateral flexion  (C3): 5    Right   Interossei strength (t1): 5  Neck lateral flexion (C3): 5    Left Shoulder     Planes of Motion   Abduction: 5   External rotation at 0°: 5     Right Shoulder     Planes of Motion   Abduction: 5   External rotation at 0°: 5     Left Elbow   Flexion: 5  Extension: 5    Right Elbow   Flexion: 5  Extension: 5    Left Wrist/Hand   Wrist extension: 5  Wrist flexion: 5  Thumb extension: 5    Right Wrist/Hand   Wrist extension: 5  Wrist flexion: 5  Thumb extension: 5    General Comments:      Cervical/Thoracic Comments  Oculomotor exam: intact, no abnormalities noted     Peripheral vision: intact and symmetrical B/L     Sensation: Diminished along facial nerve on the L side of the face; WNL on the R side      Eyebrow raise: Mild contraction on the L 4/5; Normal on the R      Cheek pucker: Able to pucker both sides with more noted R>L although there is mild contraction on the L.      Cheek blowout: able to close mouth but unable to control and blow air into the L cheek 3+/5     Smile: Mild contraction noted on the L 3+/5; Normal on the R      Frown: Unable to complete or contract on the L with minimal muscular control 3/5; Normal on the R      Platysma: Unable to contract on the L; Full, normal contraction on the R      [place]: DNP     Eye Closure: Unable to complete on L with mild ability to contract 3+/5; normal on R     Eyebrow Furrow: Normal on the R; Mild contraction on L 3+/5    TTP noted along the posterior occiput, jawline (on the L), near facial nerve on the L as well                  Diagnosis: Bell's palsy    Precautions:    POC Expires:   Re-evaluation Date:    FOTO Scores/Date: Goal -;    Visit Count 6/10       Manuals 1/31       Point Stimulator  EB mouth (2); eye (2); eyebrow lateral and medial (2); near ear (2)       Re-eval                 Ther Ex                                                                                Neuro Re-Ed        Cheek puff        Pucker HEP       Eye brow  raise HEP       Face scrunch         Eye squeeze HEP       Platysma scrunch         Duck face  HEP       Shocked face        Wide eye open          Ther Act                                                                   Modalities

## 2024-03-01 ENCOUNTER — APPOINTMENT (OUTPATIENT)
Dept: PHYSICAL THERAPY | Facility: CLINIC | Age: 74
End: 2024-03-01
Payer: MEDICARE

## 2024-03-01 ENCOUNTER — OFFICE VISIT (OUTPATIENT)
Dept: PHYSICAL THERAPY | Facility: CLINIC | Age: 74
End: 2024-03-01
Payer: MEDICARE

## 2024-03-01 DIAGNOSIS — M54.2 NECK PAIN: ICD-10-CM

## 2024-03-01 DIAGNOSIS — R51.9 HEADACHE IN BACK OF HEAD: Primary | ICD-10-CM

## 2024-03-01 PROCEDURE — 97140 MANUAL THERAPY 1/> REGIONS: CPT | Performed by: PHYSICAL THERAPIST

## 2024-03-01 PROCEDURE — 97110 THERAPEUTIC EXERCISES: CPT | Performed by: PHYSICAL THERAPIST

## 2024-03-01 NOTE — PROGRESS NOTES
"Daily Note     Today's date: 3/1/2024  Patient name: Jacobo Bliss  : 1950  MRN: [id number]  Referring provider: Jose Luis Garcia MD  Dx:   Encounter Diagnosis     ICD-10-CM    1. Headache in back of head  R51.9       2. Neck pain  M54.2                      Subjective: Pt reports that he was put on meds that were not affective to treat bell palsy and he was having too many side effects (left occipital headaches). He has since stopped the meds after seeing his PCP and headaches have reduced. Pt continues to have left occipital headache radiating to the ear, just with less intensity.       Objective: See treatment diary below  AA mobility: 40 deg L/45 deg R    Assessment: Tolerated treatment well. Continued gentle ROM and stretching. Pt still fairly tender to palpation around cervical paraspinals and soft tissue. Good relief present with rotational mobilizations. Patient exhibited good technique with therapeutic exercises and would benefit from continued PT. Upper cervical rotation has improved in both planes, despite headaches.       Plan: Continue per plan of care.        Manuals 2/8 2/15 2/16 2/23 2/27 3/1       IASTM using LLL 4' to c/s paraspinas + SHAILESH 4' to c/s paraspinas + SHAILESH 4' to c/s paraspinas + SHAILESH 3' L cervical spine region and SHAILESH 3' L cervical spine region and SHAILESH 3' L cervical spine region and SHAILESH       Rotational glides of C/S  Gr II Gr II Gr III Gr III        OA mobs  Gr II           AA mobs  10x ea           STM to SHAILESH, paraspinals, Temporalis   6' 8'' + SCM insertion 8'' + SCM insertion 5'        Neuro Re-Ed             C/s retraction  15x3\" 10x3\"  10x3\" 10x3\"        DNF activation              Scap setting with foam roller  2x10 3\"                                                               Ther Ex             Cervical rotational snag 10x3\"B  10x5\"B            3 finger rotation for Upper c/s rotation    10x ea x 5\" 10x ea for 5\" 10x5\" ea       T/S extension c/ foam roller  1x15 3\"          " "  UBE  3'/3' 3'/3' 3'/3' 3'/3' 3'/3'       Self OA mobs   10x ea for 3\" 10x ea for 3\" 10x ea for 3\" 10x ea for 3\"       L SCM stretch    10x5\"                                    Ther Activity                                       Education                10'           Gait Training                                       Modalities               10'                          1:1 with PT from [id number]     "

## 2024-03-05 ENCOUNTER — APPOINTMENT (OUTPATIENT)
Dept: PHYSICAL THERAPY | Facility: CLINIC | Age: 74
End: 2024-03-05
Payer: MEDICARE

## 2024-03-06 ENCOUNTER — OFFICE VISIT (OUTPATIENT)
Dept: PHYSICAL THERAPY | Facility: CLINIC | Age: 74
End: 2024-03-06
Payer: MEDICARE

## 2024-03-06 ENCOUNTER — APPOINTMENT (OUTPATIENT)
Dept: PHYSICAL THERAPY | Facility: CLINIC | Age: 74
End: 2024-03-06
Payer: MEDICARE

## 2024-03-06 DIAGNOSIS — R51.9 HEADACHE IN BACK OF HEAD: Primary | ICD-10-CM

## 2024-03-06 DIAGNOSIS — M54.2 NECK PAIN: ICD-10-CM

## 2024-03-06 PROCEDURE — 97140 MANUAL THERAPY 1/> REGIONS: CPT | Performed by: PHYSICAL THERAPIST

## 2024-03-06 PROCEDURE — 97110 THERAPEUTIC EXERCISES: CPT | Performed by: PHYSICAL THERAPIST

## 2024-03-06 PROCEDURE — 97112 NEUROMUSCULAR REEDUCATION: CPT | Performed by: PHYSICAL THERAPIST

## 2024-03-06 NOTE — PROGRESS NOTES
"Daily Note     Today's date: 3/6/2024  Patient name: Jacobo Bliss  : 1950  MRN: [id number]  Referring provider: Jose Luis Garcia MD  Dx:   Encounter Diagnosis     ICD-10-CM    1. Headache in back of head  R51.9       2. Neck pain  M54.2           Start Time: 728  Stop Time: 821  Total time in clinic (min): 53 minutes    Subjective: Pt reports continued left suboccipital headache that has a mind of it's own. Cannot identify a cause or effect. Admits to having a lot of stress. His son is going through additional testing to identify potential return of cancer in his hip, he is dealing with current pain and health issues, his wife has MS, and his mother is also requiring assistance.       Objective: See treatment diary below      Assessment: Consulted with patient and discussed the complexity of persistent pain and emotional state. Pt admittedly going through a lot with both his health and his families health. Discussed how wind up can help. Tolerated treatment well. Reintroduced scapular setting and lower trap activation. Patient exhibited good technique with therapeutic exercises and would benefit from continued PT.      Plan: Continue per plan of care.      Manuals 2/8 2/15 2/16 2/23 2/27 3/1 3/6      IASTM using LLL 4' to c/s paraspinas + SHAILESH 4' to c/s paraspinas + SHAILESH 4' to c/s paraspinas + SHAILESH 3' L cervical spine region and SHAILESH 3' L cervical spine region and SHAILESH 3' L cervical spine region and SHAILESH 3' L cervical spine region and SHAILESH      Rotational glides of C/S  Gr II Gr II Gr III Gr III        OA mobs  Gr II           AA mobs  10x ea           STM to SHAILESH, paraspinals, Temporalis   6' 8'' + SCM insertion 8'' + SCM insertion 5'  10'      Neuro Re-Ed             C/s retraction  15x3\" 10x3\"  10x3\" 10x3\"        DNF activation              Scap setting with foam roller  2x10 3\"     LT activation 2x10 3\"                                                           Ther Ex             Cervical rotational snag 10x3\"B  " "10x5\"B            3 finger rotation for Upper c/s rotation    10x ea x 5\" 10x ea for 5\" 10x5\" ea 10x3\"       T/S extension c/ foam roller  1x15 3\"      10x3\"       UBE  3'/3' 3'/3' 3'/3' 3'/3' 3'/3' 3'/3'      Self OA mobs   10x ea for 3\" 10x ea for 3\" 10x ea for 3\" 10x ea for 3\" 10x ea for 3\"      L SCM stretch    10x5\"                                    Ther Activity                                       Education                10'     10'      Gait Training                                       Modalities               10'                          1:1 with PT from [phone]       "

## 2024-03-08 ENCOUNTER — OFFICE VISIT (OUTPATIENT)
Dept: PHYSICAL THERAPY | Facility: CLINIC | Age: 74
End: 2024-03-08
Payer: MEDICARE

## 2024-03-08 ENCOUNTER — APPOINTMENT (OUTPATIENT)
Dept: PHYSICAL THERAPY | Facility: CLINIC | Age: 74
End: 2024-03-08
Payer: MEDICARE

## 2024-03-08 DIAGNOSIS — M54.2 NECK PAIN: ICD-10-CM

## 2024-03-08 DIAGNOSIS — R51.9 HEADACHE IN BACK OF HEAD: Primary | ICD-10-CM

## 2024-03-08 PROCEDURE — 97140 MANUAL THERAPY 1/> REGIONS: CPT | Performed by: PHYSICAL THERAPIST

## 2024-03-08 PROCEDURE — 97112 NEUROMUSCULAR REEDUCATION: CPT | Performed by: PHYSICAL THERAPIST

## 2024-03-08 NOTE — PROGRESS NOTES
"Daily Note     Today's date: 3/8/2024  Patient name: Jacobo Bliss  : 1950  MRN: [id number]  Referring provider: Jose Luis Garcia MD  Dx:   Encounter Diagnosis     ICD-10-CM    1. Headache in back of head  R51.9       2. Neck pain  M54.2           Start Time: 0730  Stop Time: 0815  Total time in clinic (min): 45 minutes    Subjective: Pt reports feeling really good after PT and our conversation last session. Note       Objective: See treatment diary below      Assessment: Implemented universal mid and low level row into POC. Tolerated treatment well. Patient exhibited good technique with therapeutic exercises and would benefit from continued PT.      Plan: Continue per plan of care.      Manuals 2/8 2/15 2/16 2/23 2/27 3/1 3/6 3/8     IASTM using LLL 4' to c/s paraspinas + SHAILESH 4' to c/s paraspinas + SHAILESH 4' to c/s paraspinas + SHAILESH 3' L cervical spine region and SHAILESH 3' L cervical spine region and SHAILESH 3' L cervical spine region and SHAILESH 3' L cervical spine region and SHAILESH 3' L cervical spine region and SHAILESH     Rotational glides of C/S  Gr II Gr II Gr III Gr III        OA mobs  Gr II           AA mobs  10x ea           STM to SHAILESH, paraspinals, Temporalis   6' 8'' + SCM insertion 8'' + SCM insertion 5'  10' 10'      Neuro Re-Ed             C/s retraction  15x3\" 10x3\"  10x3\" 10x3\"        DNF activation              Scap setting with foam roller  2x10 3\"     LT activation 2x10 3\"  LT activation 2x10 3\"      U Mid level row        10x3\" 20#      U low level row        10x3\" 20#     No money TB        1x10 ytb                   Ther Ex             Cervical rotational snag 10x3\"B  10x5\"B            3 finger rotation for Upper c/s rotation    10x ea x 5\" 10x ea for 5\" 10x5\" ea 10x3\"       T/S extension c/ foam roller  1x15 3\"      10x3\"       UBE  3'/3' 3'/3' 3'/3' 3'/3' 3'/3' 3'/3' 3'/3'     Self OA mobs   10x ea for 3\" 10x ea for 3\" 10x ea for 3\" 10x ea for 3\" 10x ea for 3\"      L SCM stretch    10x5\"                     "                Ther Activity                                       Education                10'     10'      Gait Training                                       Modalities               10'                          1:1 with PT from [phone]

## 2024-03-11 ENCOUNTER — TELEPHONE (OUTPATIENT)
Dept: NEUROLOGY | Facility: CLINIC | Age: 74
End: 2024-03-11

## 2024-03-12 ENCOUNTER — OFFICE VISIT (OUTPATIENT)
Dept: PHYSICAL THERAPY | Facility: CLINIC | Age: 74
End: 2024-03-12
Payer: MEDICARE

## 2024-03-12 ENCOUNTER — APPOINTMENT (OUTPATIENT)
Dept: PHYSICAL THERAPY | Facility: CLINIC | Age: 74
End: 2024-03-12
Payer: MEDICARE

## 2024-03-12 DIAGNOSIS — M54.2 NECK PAIN: ICD-10-CM

## 2024-03-12 DIAGNOSIS — R51.9 HEADACHE IN BACK OF HEAD: Primary | ICD-10-CM

## 2024-03-12 PROCEDURE — 97110 THERAPEUTIC EXERCISES: CPT | Performed by: PHYSICAL THERAPIST

## 2024-03-12 PROCEDURE — 97112 NEUROMUSCULAR REEDUCATION: CPT | Performed by: PHYSICAL THERAPIST

## 2024-03-12 PROCEDURE — 97140 MANUAL THERAPY 1/> REGIONS: CPT | Performed by: PHYSICAL THERAPIST

## 2024-03-12 NOTE — PROGRESS NOTES
"Daily Note     Today's date: 3/12/2024  Patient name: Jacobo Bliss  : 1950  MRN: [id number]  Referring provider: Jose Luis Garcia MD  Dx:   Encounter Diagnosis     ICD-10-CM    1. Headache in back of head  R51.9       2. Neck pain  M54.2           Start Time: 0730  Stop Time: 0815  Total time in clinic (min): 45 minutes    Subjective: Pt reports feeling really good still. No significant headache or neck pain since Friday. Has not taken advil since Friday as well. Plans to golf tomorrow and Thursday, really looking forward to that.       Objective: See treatment diary below      Assessment: Tolerated treatment well. No trigger points noted in UT or SHAILESH. Mild tenderness present throughout cervical paraspinals but symptoms intensity is much less. Continued with scapular and UQ strengthening. Patient exhibited good technique with therapeutic exercises and would benefit from continued PT      Plan: Continue per plan of care.      Manuals 2/8 2/15 2/16 2/23 2/27 3/1 3/6 3/8 3/12    IASTM using LLL 4' to c/s paraspinas + SHAILESH 4' to c/s paraspinas + SHAILESH 4' to c/s paraspinas + SHAILESH 3' L cervical spine region and SHAILESH 3' L cervical spine region and SHAILESH 3' L cervical spine region and SHAILESH 3' L cervical spine region and SHAILESH 3' L cervical spine region and SHAILEHS 3' L cervical spine region and SHAILESH    Rotational glides of C/S  Gr II Gr II Gr III Gr III    Gr III    OA mobs  Gr II           AA mobs  10x ea           STM to SHAILESH, paraspinals, Temporalis   6' 8'' + SCM insertion 8'' + SCM insertion 5'  10' 10'  10'    Neuro Re-Ed             C/s retraction  15x3\" 10x3\"  10x3\" 10x3\"        DNF activation              Scap setting with foam roller  2x10 3\"     LT activation 2x10 3\"  LT activation 2x10 3\"      U Mid level row        10x3\" 20#  10x3\" 20#    U low level row        10x3\" 20# 10x3\" 20#    No money TB        1x10 ytb  1x10 ytb     TB W         1x10 ytb     Ther Ex             Cervical rotational snag 10x3\"B  10x5\"B            3 " "finger rotation for Upper c/s rotation    10x ea x 5\" 10x ea for 5\" 10x5\" ea 10x3\"       T/S extension c/ foam roller  1x15 3\"      10x3\"       UBE  3'/3' 3'/3' 3'/3' 3'/3' 3'/3' 3'/3' 3'/3' 3'/3'    Self OA mobs   10x ea for 3\" 10x ea for 3\" 10x ea for 3\" 10x ea for 3\" 10x ea for 3\"  10x ea for 3\"    L SCM stretch    10x5\"                                    Ther Activity             Wall walks         5x ytb                  Education                10'     10'      Gait Training                                       Modalities               10'                          1:1 with PT from [phone]           "

## 2024-03-13 ENCOUNTER — OFFICE VISIT (OUTPATIENT)
Dept: PHYSICAL THERAPY | Facility: CLINIC | Age: 74
End: 2024-03-13
Payer: MEDICARE

## 2024-03-13 DIAGNOSIS — G51.0 BELL'S PALSY: Primary | ICD-10-CM

## 2024-03-13 PROCEDURE — 97140 MANUAL THERAPY 1/> REGIONS: CPT

## 2024-03-13 NOTE — PROGRESS NOTES
Daily Note     Today's date: 3/13/2024  Patient name: Jacobo Bliss  : 1950  MRN: [id number]  Referring provider: Jose Luis Garcia MD  Dx:   Encounter Diagnosis     ICD-10-CM    1. Bell's palsy  G51.0                      Subjective: Patient finds that he has some small improvements in muscle control of the face. Has been told by friends that he has slowly been looking better. Reports compliance with HEP.       Objective: See treatment diary below      Assessment: Tolerated treatment well. Initiated POC this date. ROS shows noted improvements in control of all facial musculature. Most notably patient better able to raise eyebrows as well as complete closure of eyelid. Instructed patient to maintain using eyewear to protect eye as he is unable to blink without cueing. Unable to furrow brow or smile completely. After point stimulator, improved muscle contraction noted at all motor points. Progress as able. Patient would benefit from continued PT      Plan: Continue per plan of care.        Diagnosis: Bell's palsy    Precautions:    POC Expires:   Re-evaluation Date:    FOTO Scores/Date: Goal -;    Visit Count 1/10 2/10      Manuals 2/29 3/13      Point Stimulator  EB mouth (2); eye (2); eyebrow lateral and medial (2); near ear (2) EB mouth (2); eye (2); eyebrow lateral and medial (2); near ear (2)      Re-eval                 Ther Ex                                                                                Neuro Re-Ed        HEP review  EB       Cheek puff        Pucker HEP       Eye brow raise HEP       Face scrunch         Eye squeeze HEP       Platysma scrunch         Duck face  HEP       Shocked face        Wide eye open          Ther Act                                                                   Modalities

## 2024-03-13 NOTE — PROGRESS NOTES
"Daily Note     Today's date: 2024  Patient name: Jacobo Bliss  : 1950  MRN: [id number]  Referring provider: Jose Luis Garcia MD  Dx:   Encounter Diagnosis     ICD-10-CM    1. Headache in back of head  R51.9       2. Neck pain  M54.2                      Subjective: Pt reports feeling good today compared to the past 3 days. Still feeling mainly left side headaches and neck pain. He's attributing this to his new regiment of medications. Pt would like to schedule for bell's palsy for point stimulator treatment.       Objective: See treatment diary below      Assessment: Tolerated treatment well. Focused treatment on ROM and flexibility of the upper cervical spine. Patient exhibited good technique with therapeutic exercises. Minor cueing required throughout program, both verbal and tactile. Pt still fairly tender to palpation to SHAILESH and cervical paraspinals.       Plan: Continue per plan of care.      Precautions: Skin CA, Anxiety, DDD      Manuals 2/8 2/15 2/16 2/23 2/27        IASTM using LLL 4' to c/s paraspinas + SHAILESH 4' to c/s paraspinas + SHAILESH 4' to c/s paraspinas + SHAILESH 3' L cervical spine region and SHAILESH 3' L cervical spine region and SHAILESH        Rotational glides of C/S  Gr II Gr II Gr III Gr III        OA mobs  Gr II           AA mobs  10x ea           STM to SHAILESH, paraspinals, Temporalis   6' 8'' + SCM insertion 8'' + SCM insertion        Neuro Re-Ed             C/s retraction  15x3\" 10x3\"  10x3\" 10x3\"        DNF activation              Scap setting with foam roller  2x10 3\"                                                               Ther Ex             Cervical rotational snag 10x3\"B  10x5\"B            3 finger rotation for Upper c/s rotation    10x ea x 5\" 10x ea for 5\"        T/S extension c/ foam roller  1x15 3\"            UBE  3'/3' 3'/3' 3'/3' 3'/3'        Self OA mobs   10x ea for 3\" 10x ea for 3\" 10x ea for 3\"        L SCM stretch    10x5\"                                    Ther Activity         "                               Education                10'           Gait Training                                       Modalities               10'                          1:1 with PT from [phone]            Her/She

## 2024-03-15 ENCOUNTER — EVALUATION (OUTPATIENT)
Dept: PHYSICAL THERAPY | Facility: CLINIC | Age: 74
End: 2024-03-15
Payer: MEDICARE

## 2024-03-15 ENCOUNTER — APPOINTMENT (OUTPATIENT)
Dept: PHYSICAL THERAPY | Facility: CLINIC | Age: 74
End: 2024-03-15
Payer: MEDICARE

## 2024-03-15 DIAGNOSIS — M54.2 NECK PAIN: ICD-10-CM

## 2024-03-15 DIAGNOSIS — R51.9 HEADACHE IN BACK OF HEAD: Primary | ICD-10-CM

## 2024-03-15 PROCEDURE — 97140 MANUAL THERAPY 1/> REGIONS: CPT | Performed by: PHYSICAL THERAPIST

## 2024-03-15 PROCEDURE — 97112 NEUROMUSCULAR REEDUCATION: CPT | Performed by: PHYSICAL THERAPIST

## 2024-03-15 PROCEDURE — 97110 THERAPEUTIC EXERCISES: CPT | Performed by: PHYSICAL THERAPIST

## 2024-03-15 NOTE — LETTER
March 15, 2024    Jose Luis Garcia MD   [facility]  [address]  [facility] [address]    Patient: Jacobo Bliss   YOB: 1950   Date of Visit: 3/15/2024     Encounter Diagnosis     ICD-10-CM    1. Headache in back of head  R51.9       2. Neck pain  M54.2           Dear Dr. Garcia:    Thank you for your recent referral of Jacobo Bliss. Please review the attached evaluation summary from Jacobo's recent visit.     Please verify that you agree with the plan of care by signing the attached order.     If you have any questions or concerns, please do not hesitate to call.     I sincerely appreciate the opportunity to share in the care of one of your patients and hope to have another opportunity to work with you in the near future.       Sincerely,    Hany Guzman, PT      Referring Provider:      I certify that I have read the below Plan of Care and certify the need for these services furnished under this plan of treatment while under my care.                    Jose Luis Garcia MD   [facility]  [address]  [facility] [address]  Via Fax: [phone]          [id number]    Today's date: 3/15/2024  Patient name: Jacobo Bliss  : 1950  MRN: [id number]  Referring provider: Jose Luis Garcia MD  Dx:   Encounter Diagnosis     ICD-10-CM    1. Headache in back of head  R51.9       2. Neck pain  M54.2           Start Time: 0730  Stop Time: 0815  Total time in clinic (min): 45 minutes    Assessment  Assessment details: Jacobo Bliss is a 73 y.o. male who presents with signs and symptoms consistent of persistent neck pain with posterior headaches. Pt has been experiencing headaches for 3-4 years. Throughout the course of PT, pt developed insidous onset bell's palsey which is progressively improving. He is scheduled to see a neurologist next week. Pt has progressively improved over the course of PT. His headaches are improving, neck pain reducing, ROM improving, and strength developing. His joint mobility has  gradually become more mobile. FOTO has improved from 46 to 68. All primary movement impairments and objective measures improved. We discussed the pathway of persistent pain as all progress may not be linear in the future. Pt will benefit from continuation of skilled PT to address impairments and improve function.       Primary movement impairments:   1) Decreased OA and AA joint mobility and ROM -Improving  2) Decreased c/s ROM-Improving  3) Peripheral sensitization to the neck and UQ -Improving    Impairments: abnormal muscle firing, abnormal muscle tone, abnormal or restricted ROM, activity intolerance, lacks appropriate home exercise program, pain with function, poor posture  and poor body mechanics    Goals  Short Term Goals: to be achieved by 4 weeks  1) Patient to be independent with basic HEP.-MET  2) Decrease pain to 3/10 at its worst.-Partially met  3) Increase cervical spine ROM by 5-10 degrees in all deficient planes.-Partially met  4) Increase UE strength by 1/2 MMT grade in all deficient planes.-Partially met  5) Improve joint mobility in cervical spine to normal -Partially met    Long Term Goals: to be achieved by discharge  1) FOTO equal to or greater than expected.-Partially met  2) Patient to be independent with comprehensive HEP.-Partially met  3) Cervical spine ROM WNL all planes to improve a/iadls.-Partially met  4) Increase UE strength to 1 MMT grade in all planes to improve a/iadls.-Partially met  5) Reduce headache intensity by 50%-Partially met      Plan  Patient would benefit from: PT eval and skilled physical therapy  Planned modality interventions: low level laser therapy  Planned therapy interventions: manual therapy, neuromuscular re-education, therapeutic activities, therapeutic exercise and home exercise program  Frequency: 2x per week for 4-6 weeks.  Treatment plan discussed with: patient      Subjective Evaluation    History of Present Illness  Mechanism of injury: History of Current  Injury: Pt transferred to [facility] for neck pain and headaches from our [place] PT office. Patient requested use of low level laser therapy with treatment. Pt reports increased frequency of headaches which prompted his decision to seek treatment at our [place] office. Pt has been attending PT of 1 month. However, patient was previously in PT from Sept- Nov of last year. Pt sees a chiro on a regular basis. Pt reports experiencing headaches for 3-4 years. They seem to be worsening.   Headache frequency: No rhyme or reason when it happens.   Pain location/Descriptors: P1: Pain in suboccipital region. P2: pain from upper trap to shoulders   Aggravating factors: Sleeping, driving   Easing factors: Advil helps P2 but not P1  24 HR pattern: Worsen in AM. Pt gets very little sleep at night time.  Pt does take a nap.   Imaging: MRI: 1. Multilevel disc degeneration and cervical spondylosis, greatest at C4-5 and   C5-6.   2. At C4-5, disc ridge complex with uncovertebral arthropathy on the right side   and small central disc herniation. There is mild impression of the ventral   thecal sac with moderate to severe right and mild left neural foraminal   narrowing.   3. At C5-6, disc ridge complex with uncovertebral arthropathy. There is mild   impression of the ventral thecal sac with severe bilateral neural foraminal   narrowing.     Special Questions: Denies any N&T in face are arms. Denies ataxia or balance issues, dysphagia, dysarthria, drop attacks.   Patient goals:  Help manage symptoms.   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     Pt does admit to having anxiety. Pt does take lexopro.   Pt has a wife who has MS and is a caretaker  Has children and grandchildren.   PMH significant for right foot surgery: Removal of bone spurs.   Patient Goals  Patient goals for therapy: increased strength, decreased pain, increased motion and independence with ADLs/IADLs    Pain  Pain scale:  "Mild clench in neck.  At worst pain ratin-6      Diagnostic Tests  MRI studies: normal  Treatments  Current treatment: chiropractic, medication and physical therapy      Objective     Active Range of Motion   Cervical/Thoracic Spine       Cervical  Subcranial protraction:  WFL   Subcranial retraction: Active cervical subcranial retraction: Unable to get head over shoulders.   Restriction level: maximal  Flexion: 80 degrees  with pain  Extension: 45 degrees     Left lateral flexion: 25 degrees      Right lateral flexion: 25 degrees      Left rotation: 70 degrees with pain  Right rotation: 78 degrees    with pain    Tests     Additional Tests Details  AA: L: 45 deg, R:50 deg  AA joint mobility: hypomobile  OA joint mobility: hypomobile    Latera glides: WNL  Rotational glides: hypomobile throughout- worse upper c/s    Tenderness in paraspinals and SHAILESH region    MMT:    Lower trap: 4-  Mid trap: 4  Rhomboid: 4+    Shoulder:  Flexion: 4+  ABD: 4+  ER: 4  IR: 5  Bicpes: 5  Triceps: 5  Wrist flexion/ext: 5       Precautions: Skin CA, Anxiety, DDD    Manuals 2/8 2/15 2/16 2/23 2/27 3/1 3/6 3/8 3/12 3/15   IASTM using LLL 4' to c/s paraspinas + SHAILESH 4' to c/s paraspinas + SHAILESH 4' to c/s paraspinas + SHAILESH 3' L cervical spine region and SHAILESH 3' L cervical spine region and SHAILESH 3' L cervical spine region and SHAILESH 3' L cervical spine region and SHAILESH 3' L cervical spine region and SHAILESH 3' L cervical spine region and SHAILESH 3' L cervical spine region and SHAILESH   Rotational glides of C/S  Gr II Gr II Gr III Gr III    Gr III Gr III   OA mobs  Gr II           AA mobs  10x ea           STM to SHAILESH, paraspinals, Temporalis   6' 8'' + SCM insertion 8'' + SCM insertion 5'  10' 10'  10' 5'   Neuro Re-Ed             C/s retraction  15x3\" 10x3\"  10x3\" 10x3\"        DNF activation              Scap setting with foam roller  2x10 3\"     LT activation 2x10 3\"  LT activation 2x10 3\"      U Mid level row        10x3\" 20#  10x3\" 20#    U low level row      " "  10x3\" 20# 10x3\" 20#    No money TB        1x10 ytb  1x10 ytb  2x10 ytb    TB W         1x10 ytb     Ther Ex             Cervical rotational snag 10x3\"B  10x5\"B            3 finger rotation for Upper c/s rotation    10x ea x 5\" 10x ea for 5\" 10x5\" ea 10x3\"       T/S extension c/ foam roller  1x15 3\"      10x3\"       UBE  3'/3' 3'/3' 3'/3' 3'/3' 3'/3' 3'/3' 3'/3' 3'/3' 3'/3'   Self OA mobs   10x ea for 3\" 10x ea for 3\" 10x ea for 3\" 10x ea for 3\" 10x ea for 3\"  10x ea for 3\"    L SCM stretch    10x5\"                                    Ther Activity             Wall walks         5x ytb                  Education                10'     10'      Gait Training                                       Modalities               10'                          1:1 with PT from 730-815  Pt was re-evaluated today x 20 minutes                                "

## 2024-03-15 NOTE — PROGRESS NOTES
[id number]    Today's date: 3/15/2024  Patient name: Jacobo Bliss  : 1950  MRN: [id number]  Referring provider: Jose Luis Garcia MD  Dx:   Encounter Diagnosis     ICD-10-CM    1. Headache in back of head  R51.9       2. Neck pain  M54.2           Start Time: 0730  Stop Time: 0815  Total time in clinic (min): 45 minutes    Assessment  Assessment details: Jacobo Bliss is a 73 y.o. male who presents with signs and symptoms consistent of persistent neck pain with posterior headaches. Pt has been experiencing headaches for 3-4 years. Throughout the course of PT, pt developed insidous onset bell's palsey which is progressively improving. He is scheduled to see a neurologist next week. Pt has progressively improved over the course of PT. His headaches are improving, neck pain reducing, ROM improving, and strength developing. His joint mobility has gradually become more mobile. FOTO has improved from 46 to 68. All primary movement impairments and objective measures improved. We discussed the pathway of persistent pain as all progress may not be linear in the future. Pt will benefit from continuation of skilled PT to address impairments and improve function.       Primary movement impairments:   1) Decreased OA and AA joint mobility and ROM -Improving  2) Decreased c/s ROM-Improving  3) Peripheral sensitization to the neck and UQ -Improving    Impairments: abnormal muscle firing, abnormal muscle tone, abnormal or restricted ROM, activity intolerance, lacks appropriate home exercise program, pain with function, poor posture  and poor body mechanics    Goals  Short Term Goals: to be achieved by 4 weeks  1) Patient to be independent with basic HEP.-MET  2) Decrease pain to 3/10 at its worst.-Partially met  3) Increase cervical spine ROM by 5-10 degrees in all deficient planes.-Partially met  4) Increase UE strength by 1/2 MMT grade in all deficient planes.-Partially met  5) Improve joint mobility in cervical spine to  normal -Partially met    Long Term Goals: to be achieved by discharge  1) FOTO equal to or greater than expected.-Partially met  2) Patient to be independent with comprehensive HEP.-Partially met  3) Cervical spine ROM WNL all planes to improve a/iadls.-Partially met  4) Increase UE strength to 1 MMT grade in all planes to improve a/iadls.-Partially met  5) Reduce headache intensity by 50%-Partially met      Plan  Patient would benefit from: PT eval and skilled physical therapy  Planned modality interventions: low level laser therapy  Planned therapy interventions: manual therapy, neuromuscular re-education, therapeutic activities, therapeutic exercise and home exercise program  Frequency: 2x per week for 4-6 weeks.  Treatment plan discussed with: patient      Subjective Evaluation    History of Present Illness  Mechanism of injury: History of Current Injury: Pt transferred to [place] for neck pain and headaches from our [place] [facility] office. Patient requested use of low level laser therapy with treatment. Pt reports increased frequency of headaches which prompted his decision to seek treatment at our [place] office. Pt has been attending PT of 1 month. However, patient was previously in PT from Sept- Nov of last year. Pt sees a chiro on a regular basis. Pt reports experiencing headaches for 3-4 years. They seem to be worsening.   Headache frequency: No rhyme or reason when it happens.   Pain location/Descriptors: P1: Pain in suboccipital region. P2: pain from upper trap to shoulders   Aggravating factors: Sleeping, driving   Easing factors: Advil helps P2 but not P1  24 HR pattern: Worsen in AM. Pt gets very little sleep at night time.  Pt does take a nap.   Imaging: MRI: 1. Multilevel disc degeneration and cervical spondylosis, greatest at C4-5 and   C5-6.   2. At C4-5, disc ridge complex with uncovertebral arthropathy on the right side   and small central disc herniation. There is mild impression of the ventral    thecal sac with moderate to severe right and mild left neural foraminal   narrowing.   3. At C5-6, disc ridge complex with uncovertebral arthropathy. There is mild   impression of the ventral thecal sac with severe bilateral neural foraminal   narrowing.     Special Questions: Denies any N&T in face are arms. Denies ataxia or balance issues, dysphagia, dysarthria, drop attacks.   Patient goals:  Help manage symptoms.   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     Pt does admit to having anxiety. Pt does take lexopro.   Pt has a wife who has MS and is a caretaker  Has children and grandchildren.   PMH significant for right foot surgery: Removal of bone spurs.   Patient Goals  Patient goals for therapy: increased strength, decreased pain, increased motion and independence with ADLs/IADLs    Pain  Pain scale: Mild clench in neck.  At worst pain ratin-6      Diagnostic Tests  MRI studies: normal  Treatments  Current treatment: chiropractic, medication and physical therapy      Objective     Active Range of Motion   Cervical/Thoracic Spine       Cervical  Subcranial protraction:  WFL   Subcranial retraction: Active cervical subcranial retraction: Unable to get head over shoulders.   Restriction level: maximal  Flexion: 80 degrees  with pain  Extension: 45 degrees     Left lateral flexion: 25 degrees      Right lateral flexion: 25 degrees      Left rotation: 70 degrees with pain  Right rotation: 78 degrees    with pain    Tests     Additional Tests Details  AA: L: 45 deg, R:50 deg  AA joint mobility: hypomobile  OA joint mobility: hypomobile    Latera glides: WNL  Rotational glides: hypomobile throughout- worse upper c/s    Tenderness in paraspinals and SHAILESH region    MMT:    Lower trap: 4-  Mid trap: 4  Rhomboid: 4+    Shoulder:  Flexion: 4+  ABD: 4+  ER: 4  IR: 5  Bicpes: 5  Triceps: 5  Wrist flexion/ext: 5       Precautions: Skin CA, Anxiety, DDD    Manuals 2/8 2/15  "2/16 2/23 2/27 3/1 3/6 3/8 3/12 3/15   IASTM using LLL 4' to c/s paraspinas + SHAILESH 4' to c/s paraspinas + SHAILESH 4' to c/s paraspinas + SHAILESH 3' L cervical spine region and SHAILESH 3' L cervical spine region and SHAILESH 3' L cervical spine region and SHAILESH 3' L cervical spine region and SHAILESH 3' L cervical spine region and SHAILESH 3' L cervical spine region and SHAILESH 3' L cervical spine region and SHAILESH   Rotational glides of C/S  Gr II Gr II Gr III Gr III    Gr III Gr III   OA mobs  Gr II           AA mobs  10x ea           STM to SHAILESH, paraspinals, Temporalis   6' 8'' + SCM insertion 8'' + SCM insertion 5'  10' 10'  10' 5'   Neuro Re-Ed             C/s retraction  15x3\" 10x3\"  10x3\" 10x3\"        DNF activation              Scap setting with foam roller  2x10 3\"     LT activation 2x10 3\"  LT activation 2x10 3\"      U Mid level row        10x3\" 20#  10x3\" 20#    U low level row        10x3\" 20# 10x3\" 20#    No money TB        1x10 ytb  1x10 ytb  2x10 ytb    TB W         1x10 ytb     Ther Ex             Cervical rotational snag 10x3\"B  10x5\"B            3 finger rotation for Upper c/s rotation    10x ea x 5\" 10x ea for 5\" 10x5\" ea 10x3\"       T/S extension c/ foam roller  1x15 3\"      10x3\"       UBE  3'/3' 3'/3' 3'/3' 3'/3' 3'/3' 3'/3' 3'/3' 3'/3' 3'/3'   Self OA mobs   10x ea for 3\" 10x ea for 3\" 10x ea for 3\" 10x ea for 3\" 10x ea for 3\"  10x ea for 3\"    L SCM stretch    10x5\"                                    Ther Activity             Wall walks         5x ytb                  Education                10'     10'      Gait Training                                       Modalities               10'                          1:1 with PT from 730-815  Pt was re-evaluated today x 20 minutes                "

## 2024-03-18 NOTE — PROGRESS NOTES
[facility]     CONSULT NOTE       Name:  Jacobo Bliss  MRN: [id number]  : 1950  Date: 24    ASSESSMENT & PLAN     Cervicogenic headache  Chronic headaches that start in the posterior neck and will radiate up into the head when they become more severe. Additionally there is a component of occipital neuralgia as he has exquisite tenderness over the bilateral greater occipital nerves. Headaches have improved in frequency and duration since starting Physical Therapy and has not had a headache in the last 2 weeks. Typically has good response to OTC acetaminophen or ibuprofen and will need to use Fioricet on occasion. Additionally reports that headaches are worse in the mornings but denies positional component. Also denies symptoms of sleep apnea. Has poor sleep but this is more due to pain/discomfort rather than sleep itself. We discussed the importance of continuing PT and home exercises. As his headaches are improving, he does not need a daily preventative. If headaches worsen in the future, can consider a low-dose muscle relaxer such as Tizanidine or Flexeril. Can continue as needed Fioricet. Can also consider occipital nerve blocks vs trigger point injections in the future as well. Patient can follow up on an as-needed basis. Advised to call with any questions or concerns.    Multilevel degenerative disc disease  Noted to have multilevel degenerative disc disease, cervical spondylosis, and neural foraminal narrowing at multiple levels in the cervical spine. Has been evaluated by a PA at [facility] who recommended either epidural injections or cervical fusion. He is hesitant to do either of these procedures at this time. Unfortunately his request to haven an evaluation by a physician at [facility] was not addressed and he has decided to not follow up with them. Recommend continuing PT. Can refer to [facility] in the future if he would like another evaluation.    Bell's palsy  Weakness in the left  "side of face involving both the upper and lower face consistent with a peripheral CN VII palsy/Bell's palsy. Continues to have incomplete eye closure, asymmetric smile, weakness on forming a seal with mouth. Symptoms are improving and suspect that he will have full/near full recovery with time. Advised to tape eye shut at night and continue to use lubricating eye ointment or drops. Also advised to see eye doctor immediately for any eye pain/redness as he is more prone to corneal injury with the incomplete eye closure. Can continue with PT that he is attending for the Bell's palsy.     Also, the left-sided temporal pain that he experienced at the end of February is likely due to nerve regeneration. Have a very low suspicion for TA given rapid improvement.       Diagnoses and all orders for this visit:    Cervicogenic headache    Multilevel degenerative disc disease    Bell's palsy    Other orders  -     dicyclomine (BENTYL) 10 mg capsule; Take 10 mg by mouth as needed Patient is unsure of dosage  -     Cetirizine HCl (ZyrTEC Allergy) 10 MG CAPS; Take by mouth as needed  -     GenTeal Tears Night-Time (GenTeal Tears Night-Time) opthalmic ointment; ADMINISTER TO BOTH EYES EVERY HOUR AS NEEDED FOR DRY EYES  -     meclizine (ANTIVERT) 25 mg tablet; Take 25 mg by mouth Three times daily as needed  -     Zinc 50 MG TABS; Take 1 tablet by mouth in the morning  -     Menaquinone-7 (Vitamin K2) 40 MCG TABS; Take by mouth Patient is unsure of dose and takes every other day         HISTORY OF PRESENT ILLNESS     SUBJECTIVE:    NEREIDA Centeno is a 73 y.o. right-handed male with PMHx including SCC skin cancer, DDD, and anxiety. He presents with c/o headaches. Patient reports that headaches started about 8 years ago. He reports two types of headaches: 1) \"normal\" headaches taht occur in the lower neck and shoulders and typically when he over works. These headaches typically respond well to OTC ibuprofen. 2) \"golf\" headaches - he is an " avid golfer and will get more severe headaches after golfing. These are also in the back of the neck but will radiate up into the head. They have become more severe with time. Has seen his PCP for headaches and is prescribed Fioricet. Taking it up to once per week and has good response 90% of the time. Once or twice a year will need to take a second dose. Started PT in the last few months and has noticed a great improvement in his headaches. Happening less frequently and are shorter in duration. No headaches in the last 2 weeks. Also sees a chiropractor about once per month for manipulation and traction. Does not do HVLA technique in the cervical region.     He does wake up with headaches. Denies snoring or excessive daytime sleepiness.     Has know cervical spine disease. Has been seen by a PA-[facility] at [facility]. Never saw a doctor despite requesting to see a physician. Was recommended to have either epidural injection or cervical fusion. He is not too keen on either of these procedures. Has not followed up with them.     Patient was seen in the ED at [facility] on 2/18/24 for headaches and left eye twitching. Reported at the time that he was going to PT for tension-type headaches and taking PRN Fioricet. For the 2 days prior he noticed left eye twitching and discomfort. Also felt that the left side of his face was not moving as well compared to normal. On exam, was noted to have difficulty closing left eye, unequal smile, unequal eyebrow raising. CT head was obtained and was negative for acute findings. He was diagnosed with Bell's palsy and d/c home with Rx for Prednisone taper and Valacyclovir. Lyme testing was negative.     Saw PCP on 2/29/24 for worsening headaches. Reported that these headaches started after starting Prednisone and are different from his prior headaches. Recommended to try Excedrin tension headache and stay well hydrated. Was offered MRI but wanted to discuss with Neurology first. He reports  "that this pain was \"electric\" like and in the left temple. Suspect secondary to nerve regeneration after Bell's palsy.        PREVIOUS WORKUP:    Labs REVIEWED:  - 2/18/24: Lyme negative  - 8/10/23: TSH 2.27,     Imaging REVIEWED:  - 2/18/24 CT head ([facility]): No acute hemorrhage, mass or ischemia identified. Consider MRI if symptoms persist.   - 4/23/23 MRI C-spine wo contrast ([facility]): 1. Multilevel disc degeneration and cervical spondylosis, greatest at C4-5 and C5-6. 2. At C4-5, disc ridge complex with uncovertebral arthropathy on the right side and small central disc herniation. There is mild impression of the ventral thecal sac with moderate to severe right and mild left neural foraminal narrowing. 3. At C5-6, disc ridge complex with uncovertebral arthropathy. There is mild impression of the ventral thecal sac with severe bilateral neural foraminal narrowing.      Last eye exam: few weeks ago    Where is your headache located and pain quality? Back of head/neck, tightness  What is the intensity of pain? Average: 6-7/10, worst 9-10/10  Associated symptoms:   [] Nausea       [] Vomiting        [] Diarrhea  [] Insomnia    [x] Stiff or sore neck   [x] Problems with concentration  [] Photophobia     []Phonophobia      [] Osmophobia  [] Blurred vision   [] Prefer quiet, dark room  [] Light-headed or dizzy     [] Tinnitus   [] Hands or feet tingle or feel numb/paresthesias    [] Red ear      [] Ptosis      [] Facial droop  [] Lacrimation  [] Nasal congestion/rhinorrhea   [] Flushing of face  [] Change in pupil size    Things that make the headache worse? Golf    Headache triggers:  golfing    Have you seen someone else for headaches or pain? Yes  Have you had trigger point injection performed and how often? Yes, lidocaine injections 3 years ago and 2 years ago. First injection helped for 6 months. Th second also worked. The last time had lidocaine with steroid and it did not help.   Have you had Botox injection " performed and how often? No   Have you had epidural injections or transforaminal injections performed? No  Are you current pregnant or planning on getting pregnant? N/A  Have you ever had any Brain imaging? yes - CT head     What medications do you take or have you taken for your headaches?      PREVENTATIVE ABORTIVE   CURRENT None Fioricet   PREVIOUS None Fioricet with codeine    Taking Fioricet once per week.    Alternative therapies used in the past for headaches? chiropractic care and physical therapy    Neck pain?: Yes    LIFESTYLE    Sleep   - averages: goes to bed 10:30 pm, wakes up 3:30-4:30 am  Problems falling asleep?:   No  Problems staying asleep?:  No  Will take a nap in the afternoon on most days, approximately 1 hour    How often do you get up at night? 2-3 times  Do you snore while asleep? no  Do you wake up with headaches? Yes        Physical activity: golf, PT, played baseball until age 70, rides bicycle, playing with grandkids, yard work    Water: 60-70+ oz per day  Caffeine: 3 cups of coffee per day    Mood: anxiety since childhood, follows with psychiatry. On Lexapro.  Buspar, Bupropion, and one other medication did not work for him.    The following portions of the patient's history were reviewed and updated as appropriate: allergies, current medications, past family history, past medical history, past social history, past surgical history and problem list.    Pertinent family history:  Family history of headaches:  no known family members with significant headaches  Any family history of aneurysms - No    Pertinent social history:  Work: retired - previously worked as a   Lives with lives with their spouse    Illicit Drugs: denies  Alcohol/tobacco: Denies alcohol use, Denies tobacco use    PAST MEDICAL HISTORY     Past Medical History:   Diagnosis Date   • Anxiety    • History of arthritis    • History of degenerative disc disease    • Sexual dysfunction    • Squamous cell skin  cancer 04/21/2021    SCCIS- Left Upper bridge of nose.   • Stomach problems        Patient Active Problem List   Diagnosis   • Family history of prostate cancer in father   • Microscopic hematuria   • Weight loss, unintentional   • Diarrhea   • Bloating   • Cervicogenic headache   • Multilevel degenerative disc disease   • Bell's palsy       MEDICATIONS        Current Outpatient Medications   Medication Sig Dispense Refill   • butalbital-acetaminophen-caffeine-codeine (FIORICET WITH CODEINE) -95-30 MG per capsule TAKE 1 CAPSULE BY MOUTH EVERY 6 HOURS AS NEEDED FOR HEADACHES  3   • Cetirizine HCl (ZyrTEC Allergy) 10 MG CAPS Take by mouth as needed     • cholecalciferol (VITAMIN D3) 1,000 units tablet Take 1,000 Units by mouth daily     • Cobalamin Combinations (B-12) 100-5000 MCG SUBL Place under the tongue     • diclofenac sodium (VOLTAREN) 1 % SHONDA EXT AA QID  3   • dicyclomine (BENTYL) 10 mg capsule Take 10 mg by mouth as needed Patient is unsure of dosage     • escitalopram (LEXAPRO) 10 mg tablet Take 10 mg by mouth daily      • fluticasone (FLONASE) 50 mcg/act nasal spray into each nostril     • GenTeal Tears Night-Time (GenTeal Tears Night-Time) opthalmic ointment ADMINISTER TO BOTH EYES EVERY HOUR AS NEEDED FOR DRY EYES     • meclizine (ANTIVERT) 25 mg tablet Take 25 mg by mouth Three times daily as needed     • Menaquinone-7 (Vitamin K2) 40 MCG TABS Take by mouth Patient is unsure of dose and takes every other day     • Saw [place] 450 MG CAPS Take by mouth     • Saw Palmetto, Serenoa repens, (SAW PALMETTO EXTRACT PO) Take 450 mg by mouth     • Zinc 50 MG TABS Take 1 tablet by mouth in the morning     • DOCOSAHEXAENOIC ACID PO Take 1 g by mouth     • Flaxseed, Linseed, (FLAX SEED OIL) 1000 MG CAPS Take by mouth (Patient not taking: Reported on 3/19/2024)     • loratadine (CLARITIN) 10 mg tablet Take by mouth (Patient not taking: Reported on 3/19/2024)     • Nettle, Urtica Dioica, (NETTLE LEAF PO) Take 1  "capsule by mouth (Patient not taking: Reported on 3/19/2024)       No current facility-administered medications for this visit.        ALLERGIES        Allergies   Allergen Reactions   • Penicillins Other (See Comments)     \"I think I'm allergic to penicillin. It's the only thing I can remember being allergic to, it was from my childhood though and I don't remember the reaction I had.\"       OBJECTIVE     Patient ID: Jacobo Bliss is a 73 y.o. male    Blood pressure 154/90, pulse 69, height 5' 6\" (1.676 m), weight 72.8 kg (160 lb 9.6 oz), SpO2 97%.    GENERAL EXAM:    CONSTITUTIONAL: Well developed, well nourished, well groomed. No dysmorphic features.     Eyes:  PERRLA, EOM normal      Neck:  Normal ROM, neck supple.      HEENT:  Normocephalic atraumatic. No meningismus.    Oropharynx is clear and moist. No oral mucosal lesions.   Chest:  Respirations regular and unlabored.    Cardiovascular:  Distal extremities warm without palpable edema or tenderness, no observed significant swelling.    Musculoskeletal:  Full range of motion.    Skin:  warm and dry   Psychiatric:  Normal behavior and appropriate affect      NEUROLOGICAL EXAM:    Neurological Exam  Mental Status  Awake, alert and oriented to person, place and time. Oriented to person, place, time and situation. Speech is normal. Language is fluent with no aphasia.    Cranial Nerves  CN II: Visual fields full to confrontation.  CN III, IV, VI: Extraocular movements intact bilaterally. No nystagmus. Normal saccades. Normal smooth pursuit. Left orbicular oculi weakness. Pupils equal round and reactive to light bilaterally.  CN V: Facial sensation is normal.  CN VII:  Right: There is no facial weakness.  Left: There is peripheral facial weakness.  CN VIII: Hearing is normal.  CN IX, X: Palate elevates symmetrically  CN XI: Shoulder shrug strength is normal.  CN XII: Tongue midline without atrophy or fasciculations.    Weakness in the left side of face including " eyebrow elevation, eyelid closure, forming seal with mouth, asymmetric smile.    Motor  Normal muscle bulk throughout. Normal muscle tone. No abnormal involuntary movements. Strength is 5/5 throughout all four extremities.    Sensory  Light touch is normal in upper and lower extremities.     Reflexes  Deep tendon reflexes are 2+ and symmetric in all four extremities.    Coordination  Right: Finger-to-nose normal. Rapid alternating movement normal. Heel-to-shin normal.Left: Finger-to-nose normal. Rapid alternating movement normal. Heel-to-shin normal.    Gait  Casual gait is normal including stance, stride, and arm swing. Able to rise from chair without using arms.    ROS     Review of Systems   Constitutional:  Negative for activity change, appetite change, chills, fatigue and fever.   HENT:  Negative for congestion, hearing loss, rhinorrhea, tinnitus and trouble swallowing.    Eyes:  Negative for photophobia and visual disturbance.   Respiratory:  Negative for cough and shortness of breath.    Cardiovascular:  Negative for chest pain, palpitations and leg swelling.   Gastrointestinal:  Negative for abdominal pain, diarrhea, nausea and vomiting.   Genitourinary:  Negative for difficulty urinating, dysuria and hematuria.        (-) urinary incontinence   Musculoskeletal:  Positive for arthralgias and neck pain. Negative for back pain and neck stiffness.   Skin:  Negative for rash.   Neurological:  Positive for facial asymmetry and headaches. Negative for dizziness, tremors, seizures, syncope, speech difficulty, weakness, light-headedness and numbness.   Psychiatric/Behavioral:  Negative for confusion, dysphoric mood and sleep disturbance. The patient is not nervous/anxious.    All other systems reviewed and are negative.    ======    I have discussed the patient's history, physical exam findings, assessment, and plan in detail with attending, Dr. Steiner    Thank you for allowing me to participate in the care of  your patient, Jacobo GEGE Bliss.    Linh Perez, DO  [facility]'s Neurology Residency, PGY-4

## 2024-03-19 ENCOUNTER — OFFICE VISIT (OUTPATIENT)
Dept: NEUROLOGY | Facility: CLINIC | Age: 74
End: 2024-03-19
Payer: MEDICARE

## 2024-03-19 ENCOUNTER — APPOINTMENT (OUTPATIENT)
Dept: PHYSICAL THERAPY | Facility: CLINIC | Age: 74
End: 2024-03-19
Payer: MEDICARE

## 2024-03-19 ENCOUNTER — OFFICE VISIT (OUTPATIENT)
Dept: PHYSICAL THERAPY | Facility: CLINIC | Age: 74
End: 2024-03-19
Payer: MEDICARE

## 2024-03-19 VITALS
HEART RATE: 69 BPM | WEIGHT: 160.6 LBS | SYSTOLIC BLOOD PRESSURE: 154 MMHG | OXYGEN SATURATION: 97 % | HEIGHT: 66 IN | DIASTOLIC BLOOD PRESSURE: 90 MMHG | BODY MASS INDEX: 25.81 KG/M2

## 2024-03-19 DIAGNOSIS — G51.0 BELL'S PALSY: ICD-10-CM

## 2024-03-19 DIAGNOSIS — M53.9 MULTILEVEL DEGENERATIVE DISC DISEASE: ICD-10-CM

## 2024-03-19 DIAGNOSIS — M54.2 NECK PAIN: ICD-10-CM

## 2024-03-19 DIAGNOSIS — R51.9 HEADACHE IN BACK OF HEAD: Primary | ICD-10-CM

## 2024-03-19 DIAGNOSIS — G44.86 CERVICOGENIC HEADACHE: Primary | ICD-10-CM

## 2024-03-19 PROCEDURE — 99205 OFFICE O/P NEW HI 60 MIN: CPT | Performed by: PSYCHIATRY & NEUROLOGY

## 2024-03-19 PROCEDURE — 97110 THERAPEUTIC EXERCISES: CPT | Performed by: PHYSICAL THERAPIST

## 2024-03-19 PROCEDURE — 97112 NEUROMUSCULAR REEDUCATION: CPT | Performed by: PHYSICAL THERAPIST

## 2024-03-19 PROCEDURE — 97140 MANUAL THERAPY 1/> REGIONS: CPT | Performed by: PHYSICAL THERAPIST

## 2024-03-19 RX ORDER — DICYCLOMINE HYDROCHLORIDE 10 MG/1
10 CAPSULE ORAL AS NEEDED
COMMUNITY

## 2024-03-19 RX ORDER — MINERAL OIL, WHITE PETROLATUM .03; .94 G/G; G/G
OINTMENT OPHTHALMIC
COMMUNITY
Start: 2024-02-18

## 2024-03-19 RX ORDER — GINSENG 100 MG
1 CAPSULE ORAL DAILY
COMMUNITY

## 2024-03-19 RX ORDER — MECLIZINE HYDROCHLORIDE 25 MG/1
25 TABLET ORAL 3 TIMES DAILY PRN
COMMUNITY
Start: 2023-03-31 | End: 2024-03-30

## 2024-03-19 RX ORDER — VITAMIN K2 40 MCG
TABLET ORAL
COMMUNITY

## 2024-03-19 RX ORDER — CETIRIZINE HYDROCHLORIDE 10 MG/1
CAPSULE, LIQUID FILLED ORAL AS NEEDED
COMMUNITY

## 2024-03-19 NOTE — PATIENT INSTRUCTIONS
Your headaches are what we call cervicogenic headache - which means it comes from the neck. This type of headache responds well with therapy and exercises. Use your massage gun every night to help with the muscle tightness.     There is also a component of occipital neuralgia - that's the spot on the back of the head that is sore. This area does respond well to nerve blocks. If this is a problem in the future, we can always consider it.    Since you are doing better, we can hold off on starting any medications. If your headaches get worse, we can always consider a low-dose muscle relaxer at night which will help with the headaches    You can follow up with us on an as-needed basis. Please call our office at [phone] if you have any questions or concerns

## 2024-03-19 NOTE — PROGRESS NOTES
"Daily Note     Today's date: 3/19/2024  Patient name: Jacobo Bliss  : 1950  MRN: [id number]  Referring provider: Jose Luis Garcia MD  Dx:   Encounter Diagnosis     ICD-10-CM    1. Headache in back of head  R51.9       2. Neck pain  M54.2                      Subjective: Pt overall doing well. Mild headache this am which improved with moist heat. Pt will consult with neurologist today about persistent headaches, although improving with PT.      Objective: See treatment diary below      Assessment: Tolerated treatment well. Progressed scapular stabilization exercises. Initiated cervical isometrics. Patient exhibited good technique with therapeutic exercises and would benefit from continued PT      Plan: Continue per plan of care.      Precautions: Skin CA, Anxiety, DDD    Manuals 3/19    2/27 3/1 3/6 3/8 3/12 3/15   IASTM using LLL 3' L cervical spine region and SHAILESH    3' L cervical spine region and SHAILESH 3' L cervical spine region and SHAILESH 3' L cervical spine region and SHAILESH 3' L cervical spine region and SHAILESH 3' L cervical spine region and SHAILESH 3' L cervical spine region and SHAILESH   Rotational glides of C/S Gr III    Gr III    Gr III Gr III   OA mobs             AA mobs             STM to SHAILESH, paraspinals, Temporalis 5'     8'' + SCM insertion 5'  10' 10'  10' 5'   Neuro Re-Ed             C/s retraction     10x3\"        DNF activation              Scap setting with foam roller       LT activation 2x10 3\"  LT activation 2x10 3\"      U Mid level row 10x3\" 20# 2 sets       10x3\" 20#  10x3\" 20#    U low level row 10x3\" 20# 2 sets       10x3\" 20# 10x3\" 20#    No money TB 2x10 ytb       1x10 ytb  1x10 ytb  2x10 ytb    Cervical isometrics Flexion/extension 10x3\" ea with VB against wall            TB W         1x10 ytb     Ther Ex             Cervical rotational snag             3 finger rotation for Upper c/s rotation     10x ea for 5\" 10x5\" ea 10x3\"       T/S extension c/ foam roller       10x3\"       UBE 3'/3'    3'/3' " "3'/3' 3'/3' 3'/3' 3'/3' 3'/3'   Self OA mobs     10x ea for 3\" 10x ea for 3\" 10x ea for 3\"  10x ea for 3\"    L SCM stretch                                       Ther Activity             Wall walks         5x ytb                  Education                    10'      Gait Training                                       Modalities                                       1:1 with PT from [phone]     "

## 2024-03-20 ENCOUNTER — OFFICE VISIT (OUTPATIENT)
Dept: PHYSICAL THERAPY | Facility: CLINIC | Age: 74
End: 2024-03-20
Payer: MEDICARE

## 2024-03-20 DIAGNOSIS — G51.0 BELL'S PALSY: Primary | ICD-10-CM

## 2024-03-20 PROBLEM — M53.9 MULTILEVEL DEGENERATIVE DISC DISEASE: Status: ACTIVE | Noted: 2024-03-20

## 2024-03-20 PROCEDURE — 97140 MANUAL THERAPY 1/> REGIONS: CPT

## 2024-03-20 NOTE — ASSESSMENT & PLAN NOTE
Weakness in the left side of face involving both the upper and lower face consistent with a peripheral CN VII palsy/Bell's palsy. Continues to have incomplete eye closure, asymmetric smile, weakness on forming a seal with mouth. Symptoms are improving and suspect that he will have full/near full recovery with time. Advised to tape eye shut at night and continue to use lubricating eye ointment or drops. Also advised to see eye doctor immediately for any eye pain/redness as he is more prone to corneal injury with the incomplete eye closure. Can continue with PT that he is attending for the Bell's palsy.     Also, the left-sided temporal pain that he experienced at the end of February is likely due to nerve regeneration. Have a very low suspicion for TA given rapid improvement.

## 2024-03-20 NOTE — ASSESSMENT & PLAN NOTE
Noted to have multilevel degenerative disc disease, cervical spondylosis, and neural foraminal narrowing at multiple levels in the cervical spine. Has been evaluated by a PA at [facility] who recommended either epidural injections or cervical fusion. He is hesitant to do either of these procedures at this time. Unfortunately his request to haven an evaluation by a physician at [facility] was not addressed and he has decided to not follow up with them. Recommend continuing PT. Can refer to [facility] in the future if he would like another evaluation.

## 2024-03-20 NOTE — PROGRESS NOTES
Daily Note     Today's date: 3/20/2024  Patient name: Jacobo Bliss  : 1950  MRN: [id number]  Referring provider: Jose Luis Garcia MD  Dx:   Encounter Diagnosis     ICD-10-CM    1. Bell's palsy  G51.0                      Subjective: Patient reports seeing Neurologist yesterday with positive reports. Regarding the bells palsy, patient has continued to see improvements. Neurologist expects to see near if not full recovery. Would like pt to continue with OPPT       Objective: See treatment diary below      Assessment: Tolerated treatment well. ROS continues to show noted improvements in control of all facial musculature. Symmetrical eyebrow raise noted. Improved ability to furrow brow also noted. Smiling continues to have deficit L vs R although improved since last visit. Able to puff cheek without release of air. Has been compliant with HEP. After point stimulator, improved muscle contraction noted at all motor points. Progress as able. Patient would benefit from continued PT      Plan: Continue per plan of care.        Diagnosis: Bell's palsy    Precautions:    POC Expires:   Re-evaluation Date:    FOTO Scores/Date: Goal -;    Visit Count 1/10 2/10 3/10     Manuals 2/29 3/13 3/20     Point Stimulator  EB mouth (2); eye (2); eyebrow lateral and medial (2); near ear (2) EB mouth (2); eye (2); eyebrow lateral and medial (2); near ear (2) EB mouth (2); eye (2); eyebrow lateral and medial (2); near ear (2)     Re-eval                 Ther Ex                                                                                Neuro Re-Ed        HEP review  EB       Cheek puff        Pucker HEP       Eye brow raise HEP       Face scrunch         Eye squeeze HEP       Platysma scrunch         Duck face  HEP       Shocked face        Wide eye open          Ther Act                                                                   Modalities

## 2024-03-20 NOTE — ASSESSMENT & PLAN NOTE
Chronic headaches that start in the posterior neck and will radiate up into the head when they become more severe. Additionally there is a component of occipital neuralgia as he has exquisite tenderness over the bilateral greater occipital nerves. Headaches have improved in frequency and duration since starting Physical Therapy and has not had a headache in the last 2 weeks. Typically has good response to OTC acetaminophen or ibuprofen and will need to use Fioricet on occasion. Additionally reports that headaches are worse in the mornings but denies positional component. Also denies symptoms of sleep apnea. Has poor sleep but this is more due to pain/discomfort rather than sleep itself. We discussed the importance of continuing PT and home exercises. As his headaches are improving, he does not need a daily preventative. If headaches worsen in the future, can consider a low-dose muscle relaxer such as Tizanidine or Flexeril. Can continue as needed Fioricet. Can also consider occipital nerve blocks vs trigger point injections in the future as well. Patient can follow up on an as-needed basis. Advised to call with any questions or concerns.

## 2024-03-22 ENCOUNTER — APPOINTMENT (OUTPATIENT)
Dept: PHYSICAL THERAPY | Facility: CLINIC | Age: 74
End: 2024-03-22
Payer: MEDICARE

## 2024-03-22 ENCOUNTER — OFFICE VISIT (OUTPATIENT)
Dept: PHYSICAL THERAPY | Facility: CLINIC | Age: 74
End: 2024-03-22
Payer: MEDICARE

## 2024-03-22 DIAGNOSIS — M54.2 NECK PAIN: ICD-10-CM

## 2024-03-22 DIAGNOSIS — R51.9 HEADACHE IN BACK OF HEAD: Primary | ICD-10-CM

## 2024-03-22 PROCEDURE — 97140 MANUAL THERAPY 1/> REGIONS: CPT | Performed by: PHYSICAL THERAPIST

## 2024-03-22 PROCEDURE — 97112 NEUROMUSCULAR REEDUCATION: CPT | Performed by: PHYSICAL THERAPIST

## 2024-03-22 PROCEDURE — 97110 THERAPEUTIC EXERCISES: CPT | Performed by: PHYSICAL THERAPIST

## 2024-03-26 ENCOUNTER — APPOINTMENT (OUTPATIENT)
Dept: PHYSICAL THERAPY | Facility: CLINIC | Age: 74
End: 2024-03-26
Payer: MEDICARE

## 2024-03-26 ENCOUNTER — OFFICE VISIT (OUTPATIENT)
Dept: PHYSICAL THERAPY | Facility: CLINIC | Age: 74
End: 2024-03-26
Payer: MEDICARE

## 2024-03-26 DIAGNOSIS — M54.2 NECK PAIN: ICD-10-CM

## 2024-03-26 DIAGNOSIS — R51.9 HEADACHE IN BACK OF HEAD: Primary | ICD-10-CM

## 2024-03-26 PROCEDURE — 97112 NEUROMUSCULAR REEDUCATION: CPT | Performed by: PHYSICAL THERAPIST

## 2024-03-26 PROCEDURE — 97140 MANUAL THERAPY 1/> REGIONS: CPT | Performed by: PHYSICAL THERAPIST

## 2024-03-26 PROCEDURE — 97110 THERAPEUTIC EXERCISES: CPT | Performed by: PHYSICAL THERAPIST

## 2024-03-26 NOTE — PROGRESS NOTES
"Daily Note     Today's date: 3/26/2024  Patient name: Jacobo Bliss  : 1950  MRN: [id number]  Referring provider: Jose Luis Garcia MD  Dx:   Encounter Diagnosis     ICD-10-CM    1. Headache in back of head  R51.9       2. Neck pain  M54.2           Start Time: 0730  Stop Time: 0810  Total time in clinic (min): 40 minutes    Subjective: Pt reports having an \"off day\" yesterday. Experienced temporalis pain bilaterally while golfing and felt very cold around his ears. Attempted to wear hat but cold sensitivity continued. Pt admits to have a headache for 24 hours after last treatment. Attributes this to cervical extension isometrics with ball. Pt also notes having a pulsing pain in lateral hip after golfing yesterday.       Objective: See treatment diary below      Assessment:  Modified treatment today. Held cervical isometrics. Tolerated treatment well. Tenderness continues in cervical paraspinals and SHAILESH region. Patient exhibited good technique with therapeutic exercises and would benefit from continued PT.       Plan: Continue per plan of care.      Precautions: Skin CA, Anxiety, DDD    Manuals 3/19 3/22 3/26      3/12 3/15   IASTM using LLL 3' L cervical spine region and SHAILESH 3' L cervical spine region and SHAILESH 3' L cervical spine region and SHAILESH      3' L cervical spine region and SHAILESH 3' L cervical spine region and SHAILESH   Rotational glides of C/S Gr III Gr III Gr III      Gr III Gr III   OA mobs             AA mobs             STM to SHAILESH, paraspinals, Temporalis 5'  5' 5'      10' 5'   Neuro Re-Ed             C/s retraction             DNF activation              Scap setting with foam roller             U Mid level row 10x3\" 20# 2 sets 10x3\" 20# 2 sets 10x3\" 20# 2 sets      10x3\" 20#    U low level row 10x3\" 20# 2 sets 10x3\" 20# 2 sets 1x10, 1x8 20#       10x3\" 20#    No money TB 2x10 ytb 2x10 ytb  2x10 ytb       1x10 ytb  2x10 ytb    Cervical isometrics Flexion/extension 10x3\" ea with VB against wall " "Flexion/extension/ Lat [id number]\" ea with VB against wall hold          TB W  1x10 ytb        1x10 ytb     Ther Ex             Cervical rotational snag             3 finger rotation for Upper c/s rotation             T/S extension c/ foam roller             UBE 3'/3' 3'/3' 3'/3'      3'/3' 3'/3'   Self OA mobs         10x ea for 3\"    L SCM stretch             Cervical extension snag   7x5\"                       Ther Activity             Wall walks         5x ytb                  Education                          Gait Training                                       Modalities                                       1:1 with PT from [phone]                 "

## 2024-03-27 ENCOUNTER — OFFICE VISIT (OUTPATIENT)
Dept: PHYSICAL THERAPY | Facility: CLINIC | Age: 74
End: 2024-03-27
Payer: MEDICARE

## 2024-03-27 DIAGNOSIS — G51.0 BELL'S PALSY: Primary | ICD-10-CM

## 2024-03-27 PROCEDURE — 97140 MANUAL THERAPY 1/> REGIONS: CPT

## 2024-03-27 PROCEDURE — 97112 NEUROMUSCULAR REEDUCATION: CPT

## 2024-03-27 NOTE — PROGRESS NOTES
Daily Note     Today's date: 3/27/2024  Patient name: Jacobo Bliss  : 1950  MRN: [id number]  Referring provider: Jose Luis Garcia MD  Dx:   Encounter Diagnosis     ICD-10-CM    1. Bell's palsy  G51.0                      Subjective: Patient reports seeing Neurologist yesterday with positive reports. Regarding the bells palsy, patient has continued to see improvements. Neurologist expects to see near if not full recovery. Would like pt to continue with OPPT       Objective: See treatment diary below      Assessment: Tolerated treatment well. ROS continues to show noted improvements in control of all facial musculature with most notable improvements seen with eyebrow furrowing and smiling since last visit.  Has been compliant with HEP. After point stimulator, improved muscle contraction noted at all motor points. Maintained intensity of point stimulator with addition of lower eye to facilitate closure. Educated patient on proper sleeping habits pertaining to the L eye. Progress as able. Patient would benefit from continued PT      Plan: Continue per plan of care.        Diagnosis: Bell's palsy    Precautions:    POC Expires:   Re-evaluation Date:    FOTO Scores/Date: Goal -;    Visit Count 1/10 2/10 3/10 4/10    Manuals 2/29 3/13 3/20 3/27    Point Stimulator  EB mouth (2); eye (2); eyebrow lateral and medial (2); near ear (2) EB mouth (2); eye (2); eyebrow lateral and medial (2); near ear (2) EB mouth (2); eye (2); eyebrow lateral and medial (2); near ear (2) EB mouth (2); eye (2); eyebrow lateral and medial (2); near ear (2); under eye (2)    Re-eval                 Ther Ex                                                                                Neuro Re-Ed        HEP review  EB  EB + education      Cheek puff        Pucker HEP       Eye brow raise HEP       Face scrunch         Eye squeeze HEP       Platysma scrunch         Duck face  HEP       Shocked face        Wide eye open          Ther Act                                                                    Modalities

## 2024-03-28 ENCOUNTER — OFFICE VISIT (OUTPATIENT)
Dept: DERMATOLOGY | Facility: CLINIC | Age: 74
End: 2024-03-28
Payer: MEDICARE

## 2024-03-28 DIAGNOSIS — D22.71 MULTIPLE BENIGN MELANOCYTIC NEVI OF BOTH UPPER EXTREMITIES, BOTH LOWER EXTREMITIES, AND TRUNK: ICD-10-CM

## 2024-03-28 DIAGNOSIS — L57.8 OTHER SKIN CHANGES DUE TO CHRONIC EXPOSURE TO NONIONIZING RADIATION: ICD-10-CM

## 2024-03-28 DIAGNOSIS — D22.72 MULTIPLE BENIGN MELANOCYTIC NEVI OF BOTH UPPER EXTREMITIES, BOTH LOWER EXTREMITIES, AND TRUNK: ICD-10-CM

## 2024-03-28 DIAGNOSIS — D18.01 CHERRY ANGIOMA: ICD-10-CM

## 2024-03-28 DIAGNOSIS — L82.1 SEBORRHEIC KERATOSES: ICD-10-CM

## 2024-03-28 DIAGNOSIS — D22.5 MULTIPLE BENIGN MELANOCYTIC NEVI OF BOTH UPPER EXTREMITIES, BOTH LOWER EXTREMITIES, AND TRUNK: ICD-10-CM

## 2024-03-28 DIAGNOSIS — L81.4 SOLAR LENTIGO: ICD-10-CM

## 2024-03-28 DIAGNOSIS — D22.62 MULTIPLE BENIGN MELANOCYTIC NEVI OF BOTH UPPER EXTREMITIES, BOTH LOWER EXTREMITIES, AND TRUNK: ICD-10-CM

## 2024-03-28 DIAGNOSIS — Z85.89 HISTORY OF SQUAMOUS CELL CARCINOMA: Primary | ICD-10-CM

## 2024-03-28 DIAGNOSIS — D22.61 MULTIPLE BENIGN MELANOCYTIC NEVI OF BOTH UPPER EXTREMITIES, BOTH LOWER EXTREMITIES, AND TRUNK: ICD-10-CM

## 2024-03-28 PROCEDURE — 99213 OFFICE O/P EST LOW 20 MIN: CPT | Performed by: DERMATOLOGY

## 2024-03-28 NOTE — PATIENT INSTRUCTIONS
HISTORY OF SQUAMOUS CELL CARCINOMA      Assessment and Plan:  Based on a thorough discussion of this condition and the management approach to it (including a comprehensive discussion of the known risks, side effects and potential benefits of treatment), the patient (family) agrees to implement the following specific plan:    Recommend yearly skin examination   Recommend sunscreen with SPF 30 or higher and a wide brim hat     How can SCC be prevented?  The most important way to prevent SCC is to avoid sunburn. This is especially important in childhood and early life. Fair skinned individuals and those with a personal or family history of BCC should protect their skin from sun exposure daily, year-round and lifelong.  Stay indoors or under the shade in the middle of the day   Wear covering clothing   Apply high protection factor SPF50+ broad-spectrum sunscreens generously to exposed skin if outdoors   Avoid indoor tanning (sun beds, solaria)      What is the outlook for SCC?  Most SCC are cured by treatment. Cure is most likely if treatment is undertaken when the lesion is small. A small percent of SCC's can spread to lymph  nodes and long term monitoring is indicated.   They are also at increased risk of other skin cancers, especially melanoma. Regular self-skin examinations and long-term annual skin checks by an experienced health professional are recommended.     SOUZA ANGIOMAS     Assessment and Plan:  Based on a thorough discussion of this condition and the management approach to it (including a comprehensive discussion of the known risks, side effects and potential benefits of treatment), the patient (family) agrees to implement the following specific plan:  Reassure benign        SEBORRHEIC KERATOSIS; NON-INFLAMED     Assessment and Plan:  Based on a thorough discussion of this condition and the management approach to it (including a comprehensive discussion of the known risks, side effects and potential  "benefits of treatment), the patient (family) agrees to implement the following specific plan:  Reassure benign  Use sun protection.  Apply SPF 30 or higher at least three times a day.  Wear sun protecting clothing and hats.        SOLAR LENTIGINES   OTHER SKIN CHANGES DUE TO CHRONIC EXPOSURE TO NONIONIZING RADIATION     Assessment and Plan:  Based on a thorough discussion of this condition and the management approach to it (including a comprehensive discussion of the known risks, side effects and potential benefits of treatment), the patient (family) agrees to implement the following specific plan:  Reassure benign  Use sun protection.  Apply SPF 30 or higher at least three times a day.  Wear sun protecting clothing and hats.         MULTIPLE MELANOCYTIC NEVI (\"Moles\")     Assessment and Plan:  Based on a thorough discussion of this condition and the management approach to it (including a comprehensive discussion of the known risks, side effects and potential benefits of treatment), the patient (family) agrees to implement the following specific plan:  Reassure benign  Monitor for changes  Use sun protection.  Apply SPF 30 ( broad spectrum water resistant ) or higher at least three times a day.  Wear sun protecting clothing and hats.       Worrisome signs of skin malignancy discussed, questions answered. Regular self-skin check discussed. Advised to call or return to office if patient notices any spots of concern, rapidly growing/changing lesions, bleeding lesions, non-healing lesions. Advised regular SPF use.     "

## 2024-03-28 NOTE — PROGRESS NOTES
"[facility] Note     Patient Name: Jacobo Bliss  Encounter Date: 3/28/24     Have you been cared for by a [facility] Dermatologist in the last 3 years and, if so, which description applies to you?    Yes.  I have been here within the last 3 years, and my medical history has NOT changed since that time.  I am MALE/not capable of bearing children.    REVIEW OF SYSTEMS:  Have you recently had or currently have any of the following? No changes in my recent health.   PAST MEDICAL HISTORY:  Have you personally ever had or currently have any of the following?  If \"YES,\" then please provide more detail. No changes in my medical history.   HISTORY OF IMMUNOSUPPRESSION: Do you have a history of any of the following:  Systemic Immunosuppression such as Diabetes, Biologic or Immunotherapy, Chemotherapy, Organ Transplantation, Bone Marrow Transplantation?  No     Answering \"YES\" requires the addition of the dotphrase \"IMMUNOSUPPRESSED\" as the first diagnosis of the patient's visit.   FAMILY HISTORY:  Any \"first degree relatives\" (parent, brother, sister, or child) with the following?    No changes in my family's known health.   PATIENT EXPERIENCE:    Do you want the Dermatologist to perform a COMPLETE skin exam today including a clinical examination under the \"bra and underwear\" areas?  Yes  If necessary, do we have your permission to call and leave a detailed message on your Preferred Phone number that includes your specific medical information?  Yes      Allergies   Allergen Reactions    Penicillins Other (See Comments)     \"I think I'm allergic to penicillin. It's the only thing I can remember being allergic to, it was from my childhood though and I don't remember the reaction I had.\"      Current Outpatient Medications:     butalbital-acetaminophen-caffeine-codeine (FIORICET WITH CODEINE) -39-30 MG per capsule, TAKE 1 CAPSULE BY MOUTH EVERY 6 HOURS AS NEEDED FOR HEADACHES, Disp: , Rfl: 3    Cetirizine " HCl (ZyrTEC Allergy) 10 MG CAPS, Take by mouth as needed, Disp: , Rfl:     cholecalciferol (VITAMIN D3) 1,000 units tablet, Take 1,000 Units by mouth daily, Disp: , Rfl:     Cobalamin Combinations (B-12) 100-5000 MCG SUBL, Place under the tongue, Disp: , Rfl:     diclofenac sodium (VOLTAREN) 1 %, SHONDA EXT AA QID, Disp: , Rfl: 3    dicyclomine (BENTYL) 10 mg capsule, Take 10 mg by mouth as needed Patient is unsure of dosage, Disp: , Rfl:     DOCOSAHEXAENOIC ACID PO, Take 1 g by mouth, Disp: , Rfl:     escitalopram (LEXAPRO) 10 mg tablet, Take 10 mg by mouth daily , Disp: , Rfl:     Flaxseed, Linseed, (FLAX SEED OIL) 1000 MG CAPS, Take by mouth (Patient not taking: Reported on 3/19/2024), Disp: , Rfl:     fluticasone (FLONASE) 50 mcg/act nasal spray, into each nostril, Disp: , Rfl:     GenTeal Tears Night-Time (GenTeal Tears Night-Time) opthalmic ointment, ADMINISTER TO BOTH EYES EVERY HOUR AS NEEDED FOR DRY EYES, Disp: , Rfl:     loratadine (CLARITIN) 10 mg tablet, Take by mouth (Patient not taking: Reported on 3/19/2024), Disp: , Rfl:     meclizine (ANTIVERT) 25 mg tablet, Take 25 mg by mouth Three times daily as needed, Disp: , Rfl:     Menaquinone-7 (Vitamin K2) 40 MCG TABS, Take by mouth Patient is unsure of dose and takes every other day, Disp: , Rfl:     Nettle, Urtica Dioica, (NETTLE LEAF PO), Take 1 capsule by mouth (Patient not taking: Reported on 3/19/2024), Disp: , Rfl:     Saw Palmetto 450 MG CAPS, Take by mouth, Disp: , Rfl:     Saw Palmetto, Serenoa repens, (SAW PALMETTO EXTRACT PO), Take 450 mg by mouth, Disp: , Rfl:     Zinc 50 MG TABS, Take 1 tablet by mouth in the morning, Disp: , Rfl:           Whom besides the patient is providing clinical information about today's encounter?   NO ADDITIONAL HISTORIAN (patient alone provided history)    HISTORY OF SQUAMOUS CELL CARCINOMA     Physical Exam:  Anatomic Location Affected:  Left upper bridge of nose  Morphological Description of Scar:  well healed  scar  Suspected Recurrence: no  Regional adenopathy: no    Additional History of Present Condition:  Status post Mohs 4/21/2021 by Dr Sepulveda.       Assessment and Plan:  Based on a thorough discussion of this condition and the management approach to it (including a comprehensive discussion of the known risks, side effects and potential benefits of treatment), the patient (family) agrees to implement the following specific plan:    Recommend yearly skin examination   Recommend sunscreen with SPF 30 or higher and a wide brim hat     How can SCC be prevented?  The most important way to prevent SCC is to avoid sunburn. This is especially important in childhood and early life. Fair skinned individuals and those with a personal or family history of BCC should protect their skin from sun exposure daily, year-round and lifelong.  Stay indoors or under the shade in the middle of the day   Wear covering clothing   Apply high protection factor SPF50+ broad-spectrum sunscreens generously to exposed skin if outdoors   Avoid indoor tanning (sun beds, solaria)      What is the outlook for SCC?  Most SCC are cured by treatment. Cure is most likely if treatment is undertaken when the lesion is small. A small percent of SCC's can spread to lymph  nodes and long term monitoring is indicated.   They are also at increased risk of other skin cancers, especially melanoma. Regular self-skin examinations and long-term annual skin checks by an experienced health professional are recommended.     SOUZA ANGIOMAS     Physical Exam:  Anatomic Location Affected:  Trunk and extremities  Morphological Description:  Scattered cherry red papules  Denies pain, itch, bleeding. No treatments tried. Present for years. Present constantly; no modifying factors which make it worse or better.     Assessment and Plan:  Based on a thorough discussion of this condition and the management approach to it (including a comprehensive discussion of the known risks,  "side effects and potential benefits of treatment), the patient (family) agrees to implement the following specific plan:  Reassure benign        SEBORRHEIC KERATOSIS; NON-INFLAMED     Physical Exam:  Anatomic Location Affected:  Trunk and extremities  Morphological Description:  Waxy, smooth to warty textured, yellow to brownish-grey to dark brown to blackish, discrete, \"stuck-on\" appearing papules.  Present for years. Denies pain, itch, bleeding.      Additional History of Present Condition:  Present constantly; no modifying factors which make it worse or better. No prior treatment.       Assessment and Plan:  Based on a thorough discussion of this condition and the management approach to it (including a comprehensive discussion of the known risks, side effects and potential benefits of treatment), the patient (family) agrees to implement the following specific plan:  Reassure benign  Use sun protection.  Apply SPF 30 or higher at least three times a day.  Wear sun protecting clothing and hats.        SOLAR LENTIGINES   OTHER SKIN CHANGES DUE TO CHRONIC EXPOSURE TO NONIONIZING RADIATION     Physical Exam:  Anatomic Location Affected:  Sun exposed areas of back, chest, arms, legs  Morphological Description:  Multiple scattered brown to tan evenly pigmented macules   Denies pain, itch, bleeding. No treatments tried. Present for months - years. Reports getting newer lesions with sun exposure.         Assessment and Plan:  Based on a thorough discussion of this condition and the management approach to it (including a comprehensive discussion of the known risks, side effects and potential benefits of treatment), the patient (family) agrees to implement the following specific plan:  Reassure benign  Use sun protection.  Apply SPF 30 or higher at least three times a day.  Wear sun protecting clothing and hats.         MULTIPLE MELANOCYTIC NEVI (\"Moles\")     Physical Exam:  Anatomic Location Affected: Trunk and " extremities  Morphological Description:  Scattered, round to ovoid, symmetrical-appearing, even bordered, skin colored to dark brown macules/papules  Denies pain, itch, bleeding. No treatments tried. Present for years. Present constantly; no modifying factors which make it worse or better. Denies actively changing or growing moles.      Assessment and Plan:  Based on a thorough discussion of this condition and the management approach to it (including a comprehensive discussion of the known risks, side effects and potential benefits of treatment), the patient (family) agrees to implement the following specific plan:  Reassure benign  Monitor for changes  Use sun protection.  Apply SPF 30 or higher at least three times a day.  Wear sun protecting clothing and hats.       Worrisome signs of skin malignancy discussed, questions answered. Regular self-skin check discussed. Advised to call or return to office if patient notices any spots of concern, rapidly growing/changing lesions, bleeding lesions, non-healing lesions. Advised regular SPF use.       Scribe Attestation      I,:  Pilar Encarnacion MA am acting as a scribe while in the presence of the attending physician.:       I,:  Hemant Hills MD personally performed the services described in this documentation    as scribed in my presence.:

## 2024-03-29 ENCOUNTER — OFFICE VISIT (OUTPATIENT)
Dept: PHYSICAL THERAPY | Facility: CLINIC | Age: 74
End: 2024-03-29
Payer: MEDICARE

## 2024-03-29 ENCOUNTER — APPOINTMENT (OUTPATIENT)
Dept: PHYSICAL THERAPY | Facility: CLINIC | Age: 74
End: 2024-03-29
Payer: MEDICARE

## 2024-03-29 DIAGNOSIS — M54.2 NECK PAIN: Primary | ICD-10-CM

## 2024-03-29 DIAGNOSIS — R51.9 HEADACHE IN BACK OF HEAD: ICD-10-CM

## 2024-03-29 PROCEDURE — 97110 THERAPEUTIC EXERCISES: CPT | Performed by: PHYSICAL THERAPIST

## 2024-03-29 PROCEDURE — 97140 MANUAL THERAPY 1/> REGIONS: CPT | Performed by: PHYSICAL THERAPIST

## 2024-03-29 PROCEDURE — 97112 NEUROMUSCULAR REEDUCATION: CPT | Performed by: PHYSICAL THERAPIST

## 2024-03-29 NOTE — PROGRESS NOTES
"Daily Note     Today's date: 3/29/2024  Patient name: Jacobo Bliss  : 1950  MRN: [id number]  Referring provider: Jose Luis Garcia MD  Dx:   Encounter Diagnosis     ICD-10-CM    1. Neck pain  M54.2       2. Headache in back of head  R51.9                      Subjective: Pt reports neck and headaches doing well. Unfortunately, his son had a reoccurrence of cancer which is weighing on his entire family.       Objective: See treatment diary below      Assessment: Tolerated treatment well. Attempted traction, retraction, extension for 2 reps but patient apprehensive secondary to potential vertigo. Increased low row to 30#.  Patient would benefit from continued PT.       Plan: Continue per plan of care.      Precautions: Skin CA, Anxiety, DDD    Manuals 3/19 3/22 3/26 3/29     3/12 3/15   IASTM using LLL 3' L cervical spine region and SHAILESH 3' L cervical spine region and SHAILESH 3' L cervical spine region and SHAILESH 3' L cervical spine region and SHAILESH     3' L cervical spine region and SHAILESH 3' L cervical spine region and SHAILESH   Rotational glides of C/S Gr III Gr III Gr III Gr III     Gr III Gr III   OA mobs             AA mobs             STM to SHAILESH, paraspinals, Temporalis 5'  5' 5' 5'     10' 5'   Neuro Re-Ed             C/s retraction             DNF activation              Scap setting with foam roller             U Mid level row 10x3\" 20# 2 sets 10x3\" 20# 2 sets 10x3\" 20# 2 sets 10x3\" 20# 2 sets     10x3\" 20#    U low level row 10x3\" 20# 2 sets 10x3\" 20# 2 sets 1x10, 1x8 20#  10x3\" 30#, 7x3\" 30# sets     10x3\" 20#    No money TB 2x10 ytb 2x10 ytb  2x10 ytb  2x10 ytb      1x10 ytb  2x10 ytb    Cervical isometrics Flexion/extension 10x3\" ea with VB against wall Flexion/extension/ Lat [id number]\" ea with VB against wall hold hold         TB W  1x10 ytb        1x10 ytb     Ther Ex             Cervical rotational snag             3 finger rotation for Upper c/s rotation             T/S extension c/ foam roller             UBE " "3'/3' 3'/3' 3'/3' 3'/3'     3'/3' 3'/3'   Self OA mobs         10x ea for 3\"    L SCM stretch             Cervical extension snag   7x5\" 7x5\"                      Ther Activity             Wall walks         5x ytb                  Education                          Gait Training                                       Modalities                                       1:1 with PT from [phone]               "

## 2024-04-03 ENCOUNTER — OFFICE VISIT (OUTPATIENT)
Dept: PHYSICAL THERAPY | Facility: CLINIC | Age: 74
End: 2024-04-03
Payer: MEDICARE

## 2024-04-03 DIAGNOSIS — M54.2 NECK PAIN: Primary | ICD-10-CM

## 2024-04-03 DIAGNOSIS — R51.9 HEADACHE IN BACK OF HEAD: ICD-10-CM

## 2024-04-03 PROCEDURE — 97112 NEUROMUSCULAR REEDUCATION: CPT | Performed by: PHYSICAL THERAPIST

## 2024-04-03 PROCEDURE — 97140 MANUAL THERAPY 1/> REGIONS: CPT | Performed by: PHYSICAL THERAPIST

## 2024-04-03 PROCEDURE — 97110 THERAPEUTIC EXERCISES: CPT | Performed by: PHYSICAL THERAPIST

## 2024-04-03 NOTE — PROGRESS NOTES
"Daily Note     Today's date: 4/3/2024  Patient name: Jacobo Bliss  : 1950  MRN: [id number]  Referring provider: Jose Luis Garcia MD  Dx:   Encounter Diagnosis     ICD-10-CM    1. Neck pain  M54.2       2. Headache in back of head  R51.9                      Subjective: Pt reports only having to take 2 Advil since the weekend. Headaches are minimal at this time.       Objective: See treatment diary below      Assessment: Tolerated treatment well. Cervical ROM rotation and joint mobility improved. Patient fatigued with prescribed program, but able to self monitor and modulate. Implemented serratus wall slides with lower trap activation toward end of motion. Patient would benefit from continued PT.       Plan: Continue per plan of care.      Precautions: Skin CA, Anxiety, DDD    Manuals 3/19 3/22 3/26 3/29 4/3    3/12 3/15   IASTM using LLL 3' L cervical spine region and SHAILESH 3' L cervical spine region and SHAILESH 3' L cervical spine region and SHAILESH 3' L cervical spine region and SHAILESH 3' L cervical spine region and SHAILESH    3' L cervical spine region and SHAILESH 3' L cervical spine region and SHAILESH   Rotational glides of C/S Gr III Gr III Gr III Gr III Gr III    Gr III Gr III   OA mobs             AA mobs             STM to SHAILESH, paraspinals, Temporalis 5'  5' 5' 5' 5'     10' 5'   Neuro Re-Ed             C/s retraction             DNF activation              Scap setting with foam roller             U Mid level row 10x3\" 20# 2 sets 10x3\" 20# 2 sets 10x3\" 20# 2 sets 10x3\" 20# 2 sets 10x3\" 20# 2 sets    10x3\" 20#    U low level row 10x3\" 20# 2 sets 10x3\" 20# 2 sets 1x10, 1x8 20#  10x3\" 30#, 7x3\" 30# sets 10x3\" 30#, 7x3\" 30#     10x3\" 20#    No money TB 2x10 ytb 2x10 ytb  2x10 ytb  2x10 ytb      1x10 ytb  2x10 ytb    Cervical isometrics Flexion/extension 10x3\" ea with VB against wall Flexion/extension/ Lat [id number]\" ea with VB against wall hold hold         TB W  1x10 ytb    2x10 ytb     1x10 ytb     Ther Ex             Cervical " "rotational snag             3 finger rotation for Upper c/s rotation             T/S extension c/ foam roller             UBE 3'/3' 3'/3' 3'/3' 3'/3' 3'/3'    3'/3' 3'/3'   Self OA mobs         10x ea for 3\"    L SCM stretch             Cervical extension snag   7x5\" 7x5\" 7x5\"                     Ther Activity             Wall walks         5x ytb                  Education                          Gait Training                                       Modalities                                       1:1 with PT from [phone]                 "

## 2024-04-05 ENCOUNTER — OFFICE VISIT (OUTPATIENT)
Dept: PHYSICAL THERAPY | Facility: CLINIC | Age: 74
End: 2024-04-05
Payer: MEDICARE

## 2024-04-05 DIAGNOSIS — M54.2 NECK PAIN: Primary | ICD-10-CM

## 2024-04-05 DIAGNOSIS — R51.9 HEADACHE IN BACK OF HEAD: ICD-10-CM

## 2024-04-05 PROCEDURE — 97140 MANUAL THERAPY 1/> REGIONS: CPT | Performed by: PHYSICAL THERAPIST

## 2024-04-05 PROCEDURE — 97112 NEUROMUSCULAR REEDUCATION: CPT | Performed by: PHYSICAL THERAPIST

## 2024-04-05 PROCEDURE — 97110 THERAPEUTIC EXERCISES: CPT | Performed by: PHYSICAL THERAPIST

## 2024-04-05 NOTE — PROGRESS NOTES
"Daily Note     Today's date: 2024  Patient name: Jacobo Bliss  : 1950  MRN: [id number]  Referring provider: Jose Luis Garcia MD  Dx:   Encounter Diagnosis     ICD-10-CM    1. Neck pain  M54.2       2. Headache in back of head  R51.9           Start Time: 0730  Stop Time: 0808  Total time in clinic (min): 38 minutes    Subjective: Pt reports having a headache that last all day after last treatment. Unfortunately, his son received bad news for his cancer diagnosis which has been a challenging time for his entire family.       Objective: See treatment diary below      Assessment: Tolerated treatment well. Modified treatment secondary to immediate headache after treatment. Decreased parameters of exercises today. Recommend patient listen to body while performing exercises at home. Gave patient a yellow TB to focus on W exercise for scap stabilization. Patient exhibited good technique with therapeutic exercises.      Plan: Continue per plan of care.      Precautions: Skin CA, Anxiety, DDD    Manuals 3/19 3/22 3/26 3/29 4/3 4/5   3/12 3/15   IASTM using LLL 3' L cervical spine region and SHAILESH 3' L cervical spine region and SHAILESH 3' L cervical spine region and SHAILESH 3' L cervical spine region and SHAILESH 3' L cervical spine region and SHAILESH 3' L cervical spine region and SHAILESH   3' L cervical spine region and SHAILESH 3' L cervical spine region and SHAILESH   Rotational glides of C/S Gr III Gr III Gr III Gr III Gr III Gr III   Gr III Gr III   OA mobs             AA mobs             STM to SHAILESH, paraspinals, Temporalis 5'  5' 5' 5' 5'  5'   10' 5'   Neuro Re-Ed             C/s retraction             DNF activation              Scap setting with foam roller             U Mid level row 10x3\" 20# 2 sets 10x3\" 20# 2 sets 10x3\" 20# 2 sets 10x3\" 20# 2 sets 10x3\" 20# 2 sets 1x10 20#   10x3\" 20#    U low level row 10x3\" 20# 2 sets 10x3\" 20# 2 sets 1x10, 1x8 20#  10x3\" 30#, 7x3\" 30# sets 10x3\" 30#, 7x3\" 30#  2x10 30#   10x3\" 20#    No money TB " "2x10 ytb 2x10 ytb  2x10 ytb  2x10 ytb      1x10 ytb  2x10 ytb    Cervical isometrics Flexion/extension 10x3\" ea with VB against wall Flexion/extension/ Lat [id number]\" ea with VB against wall hold hold         TB W  1x10 ytb    2x10 ytb  2x10 ytb    1x10 ytb     Ther Ex             Cervical rotational snag             3 finger rotation for Upper c/s rotation             T/S extension c/ foam roller             UBE 3'/3' 3'/3' 3'/3' 3'/3' 3'/3' 3'/3'   3'/3' 3'/3'   Self OA mobs         10x ea for 3\"    L SCM stretch             Cervical extension snag   7x5\" 7x5\" 7x5\"                     Ther Activity             Wall walks         5x ytb                  Education                          Gait Training                                       Modalities                                       1:1 with PT from [phone]                   "

## 2024-04-10 ENCOUNTER — OFFICE VISIT (OUTPATIENT)
Dept: PHYSICAL THERAPY | Facility: CLINIC | Age: 74
End: 2024-04-10
Payer: MEDICARE

## 2024-04-10 DIAGNOSIS — R51.9 HEADACHE IN BACK OF HEAD: ICD-10-CM

## 2024-04-10 DIAGNOSIS — M54.2 NECK PAIN: Primary | ICD-10-CM

## 2024-04-10 PROCEDURE — 97112 NEUROMUSCULAR REEDUCATION: CPT

## 2024-04-10 PROCEDURE — 97110 THERAPEUTIC EXERCISES: CPT

## 2024-04-10 PROCEDURE — 97140 MANUAL THERAPY 1/> REGIONS: CPT

## 2024-04-10 NOTE — PROGRESS NOTES
"Daily Note     Today's date: 4/10/2024  Patient name: Jacobo Bliss  : 1950  MRN: [id number]  Referring provider: Jose Luis Garcia MD  Dx:   Encounter Diagnosis     ICD-10-CM    1. Neck pain  M54.2       2. Headache in back of head  R51.9                      Subjective: Patient reports that he has been feeling great over the last few days. No headaches and has been able to play golf consistently.       Objective: See treatment diary below      Assessment: Tolerated treatment well. The PT continued the patient's current intervention program during today's treatment. Current exercises were maintained or progressed to promote consistency of care in line with the primary therapist's established plan. The patient tolerated manual and active treatment well today with no bouts of increased pain/headaches. Patient would benefit from continued PT      Plan: Continue per plan of care.      Precautions: Skin CA, Anxiety, DDD    Manuals 3/19 3/22 3/26 3/29 4/3 4/5 4/10  3/12 3/15   IASTM using LLL 3' L cervical spine region and SHAILESH 3' L cervical spine region and SHAILESH 3' L cervical spine region and SHAILESH 3' L cervical spine region and SHAILESH 3' L cervical spine region and SHAILESH 3' L cervical spine region and SHAILESH 3' L cervical spine region and SHAILESH  3' L cervical spine region and SHAILESH 3' L cervical spine region and SHAILESH   Rotational glides of C/S Gr III Gr III Gr III Gr III Gr III Gr III Gr III  Gr III Gr III   OA mobs             AA mobs             STM to SHAILESH, paraspinals, Temporalis 5'  5' 5' 5' 5'  5' 5'  10' 5'   Neuro Re-Ed             C/s retraction             DNF activation              Scap setting with foam roller             U Mid level row 10x3\" 20# 2 sets 10x3\" 20# 2 sets 10x3\" 20# 2 sets 10x3\" 20# 2 sets 10x3\" 20# 2 sets 1x10 20# X10 20#   10x3\" 20#    U low level row 10x3\" 20# 2 sets 10x3\" 20# 2 sets 1x10, 1x8 20#  10x3\" 30#, 7x3\" 30# sets 10x3\" 30#, 7x3\" 30#  2x10 30# 2x10 30#  10x3\" 20#    No money TB 2x10 ytb 2x10 " "ytb  2x10 ytb  2x10 ytb      1x10 ytb  2x10 ytb    Cervical isometrics Flexion/extension 10x3\" ea with VB against wall Flexion/extension/ Lat [id number]\" ea with VB against wall hold hold         TB W  1x10 ytb    2x10 ytb  2x10 ytb  2x10 ytb   1x10 ytb     Ther Ex             Cervical rotational snag             3 finger rotation for Upper c/s rotation             T/S extension c/ foam roller             UBE 3'/3' 3'/3' 3'/3' 3'/3' 3'/3' 3'/3' 3'/3'  3'/3' 3'/3'   Self OA mobs         10x ea for 3\"    L SCM stretch             Cervical extension snag   7x5\" 7x5\" 7x5\"                     Ther Activity             Wall walks         5x ytb                  Education                          Gait Training                                       Modalities                                       1:1 with PT from [phone]                     "

## 2024-04-12 ENCOUNTER — OFFICE VISIT (OUTPATIENT)
Dept: PHYSICAL THERAPY | Facility: CLINIC | Age: 74
End: 2024-04-12
Payer: MEDICARE

## 2024-04-12 DIAGNOSIS — M54.2 NECK PAIN: Primary | ICD-10-CM

## 2024-04-12 DIAGNOSIS — R51.9 HEADACHE IN BACK OF HEAD: ICD-10-CM

## 2024-04-12 PROCEDURE — 97110 THERAPEUTIC EXERCISES: CPT | Performed by: PHYSICAL THERAPIST

## 2024-04-12 PROCEDURE — 97140 MANUAL THERAPY 1/> REGIONS: CPT | Performed by: PHYSICAL THERAPIST

## 2024-04-12 NOTE — PROGRESS NOTES
"Daily Note     Today's date: 2024  Patient name: Jacobo Bliss  : 1950  MRN: [id number]  Referring provider: Jose Luis Garcia MD  Dx:   Encounter Diagnosis     ICD-10-CM    1. Neck pain  M54.2       2. Headache in back of head  R51.9           Start Time: 724  Stop Time: 08  Total time in clinic (min): 41 minutes    Subjective: Pt doing very well. He states that he hasn't felt like this for 10 years. He golfed 3 days this week and only took 2 advil.       Objective: See treatment diary below      Assessment: Tolerated treatment well. Implemented shoulder elevation with TB abd isometric to improve UQ strength. Pt does best with slow progression of exercises as this tends not to increase neck irritability. Overall, patient is pleased with progress. His functional activity continues to improve. Good compliance with both clinical and home exercise programs. Patient exhibited good technique with therapeutic exercises.      Plan: Continue per plan of care.      Precautions: Skin CA, Anxiety, DDD    Manuals 3/19 3/22 3/26 3/29 4/3 4/5 4/10 4/12     IASTM using LLL 3' L cervical spine region and SHAILESH 3' L cervical spine region and SHAILESH 3' L cervical spine region and SHAILESH 3' L cervical spine region and SHAILESH 3' L cervical spine region and SHAILESH 3' L cervical spine region and SHAILESH 3' L cervical spine region and SHAILESH 3' L cervical spine region and SHAILESH     Rotational glides of C/S Gr III Gr III Gr III Gr III Gr III Gr III Gr III Gr III     OA mobs             AA mobs             STM to SHAILESH, paraspinals, Temporalis 5'  5' 5' 5' 5'  5' 5' 5'     Neuro Re-Ed             C/s retraction             DNF activation              Scap setting with foam roller             U Mid level row 10x3\" 20# 2 sets 10x3\" 20# 2 sets 10x3\" 20# 2 sets 10x3\" 20# 2 sets 10x3\" 20# 2 sets 1x10 20# X10 20#  2x10 20#     U low level row 10x3\" 20# 2 sets 10x3\" 20# 2 sets 1x10, 1x8 20#  10x3\" 30#, 7x3\" 30# sets 10x3\" 30#, 7x3\" 30#  2x10 30# 2x10 30# 30# " "11x     No money TB 2x10 ytb 2x10 ytb  2x10 ytb  2x10 ytb          Cervical isometrics Flexion/extension 10x3\" ea with VB against wall Flexion/extension/ Lat [id number]\" ea with VB against wall hold hold         TB W  1x10 ytb    2x10 ytb  2x10 ytb  2x10 ytb  2x10 ytb      Ther Ex             Cervical rotational snag             3 finger rotation for Upper c/s rotation             T/S extension c/ foam roller             UBE 3'/3' 3'/3' 3'/3' 3'/3' 3'/3' 3'/3' 3'/3' 3'/3'     Self OA mobs             L SCM stretch             Cervical extension snag   7x5\" 7x5\" 7x5\"                     Ther Activity             Wall walks             Shoulder flexion with TB abd iso        5x     Education                          Gait Training                                       Modalities                                       1:1 with PT from [phone]                       "

## 2024-04-17 ENCOUNTER — OFFICE VISIT (OUTPATIENT)
Dept: PHYSICAL THERAPY | Facility: CLINIC | Age: 74
End: 2024-04-17
Payer: MEDICARE

## 2024-04-17 DIAGNOSIS — R51.9 HEADACHE IN BACK OF HEAD: ICD-10-CM

## 2024-04-17 DIAGNOSIS — M54.2 NECK PAIN: Primary | ICD-10-CM

## 2024-04-17 PROCEDURE — 97112 NEUROMUSCULAR REEDUCATION: CPT | Performed by: PHYSICAL THERAPIST

## 2024-04-17 PROCEDURE — 97140 MANUAL THERAPY 1/> REGIONS: CPT | Performed by: PHYSICAL THERAPIST

## 2024-04-17 PROCEDURE — 97110 THERAPEUTIC EXERCISES: CPT | Performed by: PHYSICAL THERAPIST

## 2024-04-17 NOTE — PROGRESS NOTES
"Daily Note     Today's date: 2024  Patient name: Jacobo Bliss  : 1950  MRN: [id number]  Referring provider: Jose Luis Garcia MD  Dx:   Encounter Diagnosis     ICD-10-CM    1. Neck pain  M54.2       2. Headache in back of head  R51.9                      Subjective: Pt reports golfing 2 days in a row. He had a 5 advil day yesterday but symptoms resolved with medication. Pt attempted exercises and stretches prior to taking meds.      Objective: See treatment diary below      Assessment: Tolerated treatment well. Reviewed shoulder flexion with abduction isometric for HEP. Mild discomfort noted in UT/LS region after strengthening and scapular stabilization. Instructed UT and LS for home to help with this. Pt still fatigues with current program. Patient exhibited good technique with therapeutic exercises and would benefit from continued PT      Plan: Continue per plan of care. .     Precautions: Skin CA, Anxiety, DDD    Manuals 3/19 3/22 3/26 3/29 4/3 4/5 4/10 4/12 4/17    IASTM using LLL 3' L cervical spine region and SHAILESH 3' L cervical spine region and SHAILESH 3' L cervical spine region and SHAILESH 3' L cervical spine region and SHAILESH 3' L cervical spine region and SHAILESH 3' L cervical spine region and SHAILESH 3' L cervical spine region and SHAILESH 3' L cervical spine region and SHAILESH 3' L cervical spine region and SHAILESH    Rotational glides of C/S Gr III Gr III Gr III Gr III Gr III Gr III Gr III Gr III Gr III    OA mobs             AA mobs             STM to SHAILESH, paraspinals, Temporalis 5'  5' 5' 5' 5'  5' 5' 5' 5'    Neuro Re-Ed             C/s retraction             DNF activation              Scap setting with foam roller             U Mid level row 10x3\" 20# 2 sets 10x3\" 20# 2 sets 10x3\" 20# 2 sets 10x3\" 20# 2 sets 10x3\" 20# 2 sets 1x10 20# X10 20#  2x10 20# 12# 30#    U low level row 10x3\" 20# 2 sets 10x3\" 20# 2 sets 1x10, 1x8 20#  10x3\" 30#, 7x3\" 30# sets 10x3\" 30#, 7x3\" 30#  2x10 30# 2x10 30# 30# 11x 2x10 30#    No money TB " "2x10 ytb 2x10 ytb  2x10 ytb  2x10 ytb          Cervical isometrics Flexion/extension 10x3\" ea with VB against wall Flexion/extension/ Lat [id number]\" ea with VB against wall hold hold         TB W  1x10 ytb    2x10 ytb  2x10 ytb  2x10 ytb  2x10 ytb  2x10 ytb     Ther Ex             Cervical rotational snag             3 finger rotation for Upper c/s rotation             T/S extension c/ foam roller             Levator scap stretch         HEP instruction    UBE 3'/3' 3'/3' 3'/3' 3'/3' 3'/3' 3'/3' 3'/3' 3'/3' 3'/3'    Self OA mobs             L SCM stretch             Cervical extension snag   7x5\" 7x5\" 7x5\"                     Ther Activity             Wall walks             Shoulder flexion with TB abd iso        5x 5x    Education                          Gait Training                                       Modalities                                       1:1 with PT from [id number]                         " No

## 2024-04-18 NOTE — PROGRESS NOTES
[id number]     Today's date: 2024  Patient name: Jacobo Bliss  : 1950  MRN: [id number]  Referring provider: Jose Luis Garcia MD  Dx:   Encounter Diagnosis     ICD-10-CM    1. Neck pain  M54.2       2. Headache in back of head  R51.9                    Assessment  Assessment details: Jacobo Bliss is a 73 y.o. male who presents with signs and symptoms consistent of persistent neck pain with posterior headaches. Pt has been experiencing headaches for 3-4 years. Throughout the course of PT, pt developed insidous onset bell's palsey which is progressively improving. Pt has made significant progress over the course of treatment. His pain intensity and frequency is significantly less with both headaches and posterior neck pain. ROM at cervical spine has improved in all planes. Strength is gradually improved at shoulder and scapular region. He does continues to lack endurance in his UQ and his a limited tolerance to strengthening. Due to chronicity of patient's pain, I am recommending patient attend PT 1x per week over the next 6 weeks to continue strength training, improve endurance, and continue manage pain.       Primary movement impairments:   1) Decreased OA and AA joint mobility and ROM -Improving  2) Decreased c/s ROM-Improving  3) Peripheral sensitization to the neck and UQ -Improving    Impairments: abnormal muscle firing, abnormal muscle tone, abnormal or restricted ROM, activity intolerance, lacks appropriate home exercise program, pain with function, poor posture  and poor body mechanics    Goals  Short Term Goals: to be achieved by 4 weeks  1) Patient to be independent with basic HEP.-MET  2) Decrease pain to 3/10 at its worst.-Partially met  3) Increase cervical spine ROM by 5-10 degrees in all deficient planes.-Partially met  4) Increase UE strength by 1/2 MMT grade in all deficient planes.-Partially met  5) Improve joint mobility in cervical spine to normal -Partially met    Long Term Goals:  to be achieved by discharge  1) FOTO equal to or greater than expected.-Partially met  2) Patient to be independent with comprehensive HEP.-Partially met  3) Cervical spine ROM WNL all planes to improve a/iadls.-Partially met  4) Increase UE strength to 1 MMT grade in all planes to improve a/iadls.-Partially met  5) Reduce headache intensity by 50%-Partially met      Plan  Patient would benefit from: PT eval and skilled physical therapy  Planned modality interventions: low level laser therapy  Planned therapy interventions: manual therapy, neuromuscular re-education, therapeutic activities, therapeutic exercise and home exercise program  Frequency: 2x per week for 4-6 weeks.  Treatment plan discussed with: patient      Subjective Evaluation    History of Present Illness  Mechanism of injury: History of Current Injury: Pt transferred to [place] for neck pain and headaches from our [place] PT office. Patient requested use of low level laser therapy with treatment. Pt reports increased frequency of headaches which prompted his decision to seek treatment at our [place] office. Pt has been attending PT of 1 month. However, patient was previously in PT from Sept- Nov of last year. Pt sees a chiro on a regular basis. Pt reports experiencing headaches for 3-4 years. They seem to be worsening.   Headache frequency: No rhyme or reason when it happens.   Pain location/Descriptors: P1: Pain in suboccipital region. P2: pain from upper trap to shoulders   Aggravating factors: Sleeping, driving   Easing factors: Advil helps P2 but not P1  24 HR pattern: Worsen in AM. Pt gets very little sleep at night time.  Pt does take a nap.   Imaging: MRI: 1. Multilevel disc degeneration and cervical spondylosis, greatest at C4-5 and   C5-6.   2. At C4-5, disc ridge complex with uncovertebral arthropathy on the right side   and small central disc herniation. There is mild impression of the ventral   thecal sac with moderate to severe right and  mild left neural foraminal   narrowing.   3. At C5-6, disc ridge complex with uncovertebral arthropathy. There is mild   impression of the ventral thecal sac with severe bilateral neural foraminal   narrowing.     Special Questions: Denies any N&T in face are arms. Denies ataxia or balance issues, dysphagia, dysarthria, drop attacks.   Patient goals:  Help manage symptoms.   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     Pt does admit to having anxiety. Pt does take lexopro.   Pt has a wife who has MS and is a caretaker  Has children and grandchildren.   PMH significant for right foot surgery: Removal of bone spurs.   Patient Goals  Patient goals for therapy: increased strength, decreased pain, increased motion and independence with ADLs/IADLs    Pain  Pain scale: Mild clench in neck.  At worst pain ratin-6      Diagnostic Tests  MRI studies: normal  Treatments  Current treatment: chiropractic, medication and physical therapy      Objective     Active Range of Motion   Cervical/Thoracic Spine       Cervical  Subcranial protraction:  WFL   Subcranial retraction: Active cervical subcranial retraction: Unable to get head over shoulders.   Restriction level: maximal  Flexion: 80 degrees    Extension: 54 degrees     Left lateral flexion: 55 degrees      Right lateral flexion: 45 degrees      Left rotation: 82 degrees   Right rotation: 78 degrees    with pain    Tests     Additional Tests Details  AA: L: 45 deg, R:50 deg  AA joint mobility: hypomobile  OA joint mobility: hypomobile    Latera glides: WNL  Rotational glides: hypomobile throughout- worse upper c/s    Tenderness in paraspinals and SHAILESH region    MMT:    Lower trap: 4-  Mid trap: 4  Rhomboid: 4+    Shoulder:  Flexion: 4+  ABD: 4+  ER: 4  IR: 5  Bicpes: 5  Triceps: 5  Wrist flexion/ext: 5           Precautions: Skin CA, Anxiety, DDD    Manuals 3/19 3/22 3/26 3/29 4/3 4/5 4/10 4/12 4/17 4/19   IASTM using LLL 3' L  "cervical spine region and SHAILESH 3' L cervical spine region and SHAILESH 3' L cervical spine region and SHAILESH 3' L cervical spine region and SHAILESH 3' L cervical spine region and SHAILESH 3' L cervical spine region and SHAILESH 3' L cervical spine region and SHAILESH 3' L cervical spine region and SHAILESH 3' L cervical spine region and SHAILESH 3' L cervical spine region and SHAILESH   Rotational glides of C/S Gr III Gr III Gr III Gr III Gr III Gr III Gr III Gr III Gr III Gr III   OA mobs             AA mobs             STM to SHAILESH, paraspinals, Temporalis 5'  5' 5' 5' 5'  5' 5' 5' 5' 5'   Neuro Re-Ed             C/s retraction             DNF activation              Scap setting with foam roller             U Mid level row 10x3\" 20# 2 sets 10x3\" 20# 2 sets 10x3\" 20# 2 sets 10x3\" 20# 2 sets 10x3\" 20# 2 sets 1x10 20# X10 20#  2x10 20# 12# 30#    U low level row 10x3\" 20# 2 sets 10x3\" 20# 2 sets 1x10, 1x8 20#  10x3\" 30#, 7x3\" 30# sets 10x3\" 30#, 7x3\" 30#  2x10 30# 2x10 30# 30# 11x 2x10 30#    No money TB 2x10 ytb 2x10 ytb  2x10 ytb  2x10 ytb          Cervical isometrics Flexion/extension 10x3\" ea with VB against wall Flexion/extension/ Lat [id number]\" ea with VB against wall hold hold         TB W  1x10 ytb    2x10 ytb  2x10 ytb  2x10 ytb  2x10 ytb  2x10 ytb     Ther Ex             Cervical rotational snag             3 finger rotation for Upper c/s rotation             T/S extension c/ foam roller             Levator scap stretch         HEP instruction    UBE 3'/3' 3'/3' 3'/3' 3'/3' 3'/3' 3'/3' 3'/3' 3'/3' 3'/3'    Self OA mobs             L SCM stretch             Cervical extension snag   7x5\" 7x5\" 7x5\"                     Ther Activity             Wall walks             Shoulder flexion with TB abd iso        5x 5x    Education                          Gait Training                                       Modalities             MH                          1:1 with PT from [id number]  Pt was re-evaluated x 20 minutes.                            "

## 2024-04-19 ENCOUNTER — EVALUATION (OUTPATIENT)
Dept: PHYSICAL THERAPY | Facility: CLINIC | Age: 74
End: 2024-04-19
Payer: MEDICARE

## 2024-04-19 DIAGNOSIS — M54.2 NECK PAIN: Primary | ICD-10-CM

## 2024-04-19 DIAGNOSIS — R51.9 HEADACHE IN BACK OF HEAD: ICD-10-CM

## 2024-04-19 PROCEDURE — 97110 THERAPEUTIC EXERCISES: CPT | Performed by: PHYSICAL THERAPIST

## 2024-04-19 PROCEDURE — 97112 NEUROMUSCULAR REEDUCATION: CPT | Performed by: PHYSICAL THERAPIST

## 2024-04-19 PROCEDURE — 97140 MANUAL THERAPY 1/> REGIONS: CPT | Performed by: PHYSICAL THERAPIST

## 2024-04-19 NOTE — LETTER
2024    Jose Luis Garcia MD   [facility]  [address]  [facility] [address]    Patient: Jacobo Bliss   YOB: 1950   Date of Visit: 2024     Encounter Diagnosis     ICD-10-CM    1. Neck pain  M54.2       2. Headache in back of head  R51.9           Dear Dr. Garcia:    Thank you for your recent referral of Jacobo Bliss. Please review the attached evaluation summary from Jacobo's recent visit.     Please verify that you agree with the plan of care by signing the attached order.     If you have any questions or concerns, please do not hesitate to call.     I sincerely appreciate the opportunity to share in the care of one of your patients and hope to have another opportunity to work with you in the near future.       Sincerely,    Hany Guzman, PT      Referring Provider:      I certify that I have read the below Plan of Care and certify the need for these services furnished under this plan of treatment while under my care.                    Jose Luis Garcia MD   [facility]  [address]  [facility] [address]  Via Fax: [phone]          [id number]     Today's date: 2024  Patient name: Jacobo Bliss  : 1950  MRN: [id number]  Referring provider: Jose Luis Garcia MD  Dx:   Encounter Diagnosis     ICD-10-CM    1. Neck pain  M54.2       2. Headache in back of head  R51.9                    Assessment  Assessment details: Jacobo Bliss is a 73 y.o. male who presents with signs and symptoms consistent of persistent neck pain with posterior headaches. Pt has been experiencing headaches for 3-4 years. Throughout the course of PT, pt developed insidous onset bell's palsey which is progressively improving. Pt has made significant progress over the course of treatment. His pain intensity and frequency is significantly less with both headaches and posterior neck pain. ROM at cervical spine has improved in all planes. Strength is gradually improved at shoulder and scapular region.  He does continues to lack endurance in his UQ and his a limited tolerance to strengthening. Due to chronicity of patient's pain, I am recommending patient attend PT 1x per week over the next 6 weeks to continue strength training, improve endurance, and continue manage pain.       Primary movement impairments:   1) Decreased OA and AA joint mobility and ROM -Improving  2) Decreased c/s ROM-Improving  3) Peripheral sensitization to the neck and UQ -Improving    Impairments: abnormal muscle firing, abnormal muscle tone, abnormal or restricted ROM, activity intolerance, lacks appropriate home exercise program, pain with function, poor posture  and poor body mechanics    Goals  Short Term Goals: to be achieved by 4 weeks  1) Patient to be independent with basic HEP.-MET  2) Decrease pain to 3/10 at its worst.-Partially met  3) Increase cervical spine ROM by 5-10 degrees in all deficient planes.-Partially met  4) Increase UE strength by 1/2 MMT grade in all deficient planes.-Partially met  5) Improve joint mobility in cervical spine to normal -Partially met    Long Term Goals: to be achieved by discharge  1) FOTO equal to or greater than expected.-Partially met  2) Patient to be independent with comprehensive HEP.-Partially met  3) Cervical spine ROM WNL all planes to improve a/iadls.-Partially met  4) Increase UE strength to 1 MMT grade in all planes to improve a/iadls.-Partially met  5) Reduce headache intensity by 50%-Partially met      Plan  Patient would benefit from: PT eval and skilled physical therapy  Planned modality interventions: low level laser therapy  Planned therapy interventions: manual therapy, neuromuscular re-education, therapeutic activities, therapeutic exercise and home exercise program  Frequency: 2x per week for 4-6 weeks.  Treatment plan discussed with: patient      Subjective Evaluation    History of Present Illness  Mechanism of injury: History of Current Injury: Pt transferred to [place] for  neck pain and headaches from our [place] PT office. Patient requested use of low level laser therapy with treatment. Pt reports increased frequency of headaches which prompted his decision to seek treatment at our [place] office. Pt has been attending PT of 1 month. However, patient was previously in PT from Sept- Nov of last year. Pt sees a chiro on a regular basis. Pt reports experiencing headaches for 3-4 years. They seem to be worsening.   Headache frequency: No rhyme or reason when it happens.   Pain location/Descriptors: P1: Pain in suboccipital region. P2: pain from upper trap to shoulders   Aggravating factors: Sleeping, driving   Easing factors: Advil helps P2 but not P1  24 HR pattern: Worsen in AM. Pt gets very little sleep at night time.  Pt does take a nap.   Imaging: MRI: 1. Multilevel disc degeneration and cervical spondylosis, greatest at C4-5 and   C5-6.   2. At C4-5, disc ridge complex with uncovertebral arthropathy on the right side   and small central disc herniation. There is mild impression of the ventral   thecal sac with moderate to severe right and mild left neural foraminal   narrowing.   3. At C5-6, disc ridge complex with uncovertebral arthropathy. There is mild   impression of the ventral thecal sac with severe bilateral neural foraminal   narrowing.     Special Questions: Denies any N&T in face are arms. Denies ataxia or balance issues, dysphagia, dysarthria, drop attacks.   Patient goals:  Help manage symptoms.   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     Pt does admit to having anxiety. Pt does take lexopro.   Pt has a wife who has MS and is a caretaker  Has children and grandchildren.   PMH significant for right foot surgery: Removal of bone spurs.   Patient Goals  Patient goals for therapy: increased strength, decreased pain, increased motion and independence with ADLs/IADLs    Pain  Pain scale: Mild clench in neck.  At worst pain  "ratin-6      Diagnostic Tests  MRI studies: normal  Treatments  Current treatment: chiropractic, medication and physical therapy      Objective     Active Range of Motion   Cervical/Thoracic Spine       Cervical  Subcranial protraction:  WFL   Subcranial retraction: Active cervical subcranial retraction: Unable to get head over shoulders.   Restriction level: maximal  Flexion: 80 degrees    Extension: 54 degrees     Left lateral flexion: 55 degrees      Right lateral flexion: 45 degrees      Left rotation: 82 degrees   Right rotation: 78 degrees    with pain    Tests     Additional Tests Details  AA: L: 45 deg, R:50 deg  AA joint mobility: hypomobile  OA joint mobility: hypomobile    Latera glides: WNL  Rotational glides: hypomobile throughout- worse upper c/s    Tenderness in paraspinals and SHAILESH region    MMT:    Lower trap: 4-  Mid trap: 4  Rhomboid: 4+    Shoulder:  Flexion: 4+  ABD: 4+  ER: 4  IR: 5  Bicpes: 5  Triceps: 5  Wrist flexion/ext: 5           Precautions: Skin CA, Anxiety, DDD    Manuals 3/19 3/22 3/26 3/29 4/3 4/5 4/10 4/12 4/17 4/19   IASTM using LLL 3' L cervical spine region and SHAILESH 3' L cervical spine region and SHAILESH 3' L cervical spine region and SHAILESH 3' L cervical spine region and SHAILESH 3' L cervical spine region and SHAILEHS 3' L cervical spine region and SHAILESH 3' L cervical spine region and SHAILESH 3' L cervical spine region and SHAILESH 3' L cervical spine region and SHAILESH 3' L cervical spine region and SHAILESH   Rotational glides of C/S Gr III Gr III Gr III Gr III Gr III Gr III Gr III Gr III Gr III Gr III   OA mobs             AA mobs             STM to SHAILESH, paraspinals, Temporalis 5'  5' 5' 5' 5'  5' 5' 5' 5' 5'   Neuro Re-Ed             C/s retraction             DNF activation              Scap setting with foam roller             U Mid level row 10x3\" 20# 2 sets 10x3\" 20# 2 sets 10x3\" 20# 2 sets 10x3\" 20# 2 sets 10x3\" 20# 2 sets 1x10 20# X10 20#  2x10 20# 12# 30#    U low level row 10x3\" 20# 2 sets 10x3\" 20# " "2 sets 1x10, 1x8 20#  10x3\" 30#, 7x3\" 30# sets 10x3\" 30#, 7x3\" 30#  2x10 30# 2x10 30# 30# 11x 2x10 30#    No money TB 2x10 ytb 2x10 ytb  2x10 ytb  2x10 ytb          Cervical isometrics Flexion/extension 10x3\" ea with VB against wall Flexion/extension/ Lat [id number]\" ea with VB against wall hold hold         TB W  1x10 ytb    2x10 ytb  2x10 ytb  2x10 ytb  2x10 ytb  2x10 ytb     Ther Ex             Cervical rotational snag             3 finger rotation for Upper c/s rotation             T/S extension c/ foam roller             Levator scap stretch         HEP instruction    UBE 3'/3' 3'/3' 3'/3' 3'/3' 3'/3' 3'/3' 3'/3' 3'/3' 3'/3'    Self OA mobs             L SCM stretch             Cervical extension snag   7x5\" 7x5\" 7x5\"                     Ther Activity             Wall walks             Shoulder flexion with TB abd iso        5x 5x    Education                          Gait Training                                       Modalities                                       1:1 with PT from 730-810a  Pt was re-evaluated x 20 minutes.                                            "

## 2024-04-24 ENCOUNTER — OFFICE VISIT (OUTPATIENT)
Dept: PHYSICAL THERAPY | Facility: CLINIC | Age: 74
End: 2024-04-24
Payer: MEDICARE

## 2024-04-24 DIAGNOSIS — R51.9 HEADACHE IN BACK OF HEAD: ICD-10-CM

## 2024-04-24 DIAGNOSIS — M54.2 NECK PAIN: Primary | ICD-10-CM

## 2024-04-24 PROCEDURE — 97110 THERAPEUTIC EXERCISES: CPT | Performed by: PHYSICAL THERAPIST

## 2024-04-24 PROCEDURE — 97112 NEUROMUSCULAR REEDUCATION: CPT | Performed by: PHYSICAL THERAPIST

## 2024-04-24 PROCEDURE — 97140 MANUAL THERAPY 1/> REGIONS: CPT | Performed by: PHYSICAL THERAPIST

## 2024-04-24 NOTE — PROGRESS NOTES
"Daily Note     Today's date: 2024  Patient name: Jacobo Bliss  : 1950  MRN: [id number]  Referring provider: Jose Luis Garcia MD  Dx:   Encounter Diagnosis     ICD-10-CM    1. Neck pain  M54.2       2. Headache in back of head  R51.9           Start Time: 0730  Stop Time: 0810  Total time in clinic (min): 40 minutes    Subjective: Pt reports having a 5 advil day yesterday but today is much better.       Objective: See treatment diary below      Assessment: Tolerated treatment well. Reduced weight with universal machine today to accommodate for soreness. Patient exhibited good technique with therapeutic exercises and would benefit from continued PT.      Plan: Continue per plan of care.      Precautions: Skin CA, Anxiety, DDD    Manuals 4/24   3/29 4/3 4/5 4/10 4/12 4/17 4/19   IASTM using LLL 3' L cervical spine region and SHAILESH   3' L cervical spine region and SHAILESH 3' L cervical spine region and SHAILESH 3' L cervical spine region and SHAILESH 3' L cervical spine region and SHAILESH 3' L cervical spine region and SHAILESH 3' L cervical spine region and SHAILESH 3' L cervical spine region and SHAILESH   Rotational glides of C/S Gr III   Gr III Gr III Gr III Gr III Gr III Gr III Gr III   OA mobs             AA mobs             STM to SHAILESH, paraspinals, Temporalis 5'   5' 5'  5' 5' 5' 5' 5'   Neuro Re-Ed             C/s retraction             DNF activation              Scap setting with foam roller             U Mid level row 2x10 20#   10x3\" 20# 2 sets 10x3\" 20# 2 sets 1x10 20# X10 20#  2x10 20# 12# 30#    U low level row 2x10 20#   10x3\" 30#, 7x3\" 30# sets 10x3\" 30#, 7x3\" 30#  2x10 30# 2x10 30# 30# 11x 2x10 30#    No money TB    2x10 ytb          Cervical isometrics    hold         TB W 2x10 ytb     2x10 ytb  2x10 ytb  2x10 ytb  2x10 ytb  2x10 ytb     Ther Ex             Cervical rotational snag             3 finger rotation for Upper c/s rotation             T/S extension c/ foam roller             Levator scap stretch         HEP " "instruction    UBE 3'/3'   3'/3' 3'/3' 3'/3' 3'/3' 3'/3' 3'/3'    Self OA mobs             L SCM stretch             Cervical extension snag    7x5\" 7x5\"                     Ther Activity             Wall walks             Shoulder flexion with TB abd iso        5x 5x    Education                          Gait Training                                       Modalities                                       1:1 with PT from [id number]                               "

## 2024-04-26 ENCOUNTER — OFFICE VISIT (OUTPATIENT)
Dept: PHYSICAL THERAPY | Facility: CLINIC | Age: 74
End: 2024-04-26
Payer: MEDICARE

## 2024-04-26 DIAGNOSIS — G51.0 BELL'S PALSY: Primary | ICD-10-CM

## 2024-04-26 DIAGNOSIS — M54.2 NECK PAIN: Primary | ICD-10-CM

## 2024-04-26 DIAGNOSIS — R51.9 HEADACHE IN BACK OF HEAD: ICD-10-CM

## 2024-04-26 PROCEDURE — 97140 MANUAL THERAPY 1/> REGIONS: CPT

## 2024-04-26 PROCEDURE — 97140 MANUAL THERAPY 1/> REGIONS: CPT | Performed by: PHYSICAL THERAPIST

## 2024-04-26 PROCEDURE — 97110 THERAPEUTIC EXERCISES: CPT | Performed by: PHYSICAL THERAPIST

## 2024-04-26 PROCEDURE — 97112 NEUROMUSCULAR REEDUCATION: CPT | Performed by: PHYSICAL THERAPIST

## 2024-04-26 NOTE — PROGRESS NOTES
"Daily Note     Today's date: 2024  Patient name: Jacobo Bliss  : 1950  MRN: [id number]  Referring provider: Jose Luis Garcia MD  Dx:   Encounter Diagnosis     ICD-10-CM    1. Bell's palsy  G51.0                      Subjective: Patient notes that his eye has been drying out more frequently then previous weeks. Has continued to have noted improvements in eye muscle strength.       Objective: See treatment diary below      Assessment: Tolerated treatment well. The PT re-initiated point stimulator this date. Maintained intensity at all motor points with patient unable to tolerate higher intensity. ROS shows continued improvement in eye movements, especially the lower eyelid. Progress as able.  Patient would benefit from continued PT      Plan: Continue per plan of care.      Precautions: Skin CA, Anxiety, DDD      Manuals 3/19 3/22 3/26 3/29 4/3 4/5 4/10 4/26 3/12 3/15   IASTM using LLL 3' L cervical spine region and SHAILESH 3' L cervical spine region and SHAILESH 3' L cervical spine region and SHAILESH 3' L cervical spine region and SHAILESH 3' L cervical spine region and SHAILESH 3' L cervical spine region and SHAILESH 3' L cervical spine region and SHAILESH  3' L cervical spine region and SHAILESH 3' L cervical spine region and SHAILESH   Rotational glides of C/S Gr III Gr III Gr III Gr III Gr III Gr III Gr III  Gr III Gr III   OA mobs             AA mobs             Point stimulator         Lvl 2 near ear, lateral eyelid, under eyelid, above the eyebrow     STM to SHAILESH, paraspinals, Temporalis 5'  5' 5' 5' 5'  5' 5'  10' 5'   Neuro Re-Ed             C/s retraction             DNF activation              Scap setting with foam roller             U Mid level row 10x3\" 20# 2 sets 10x3\" 20# 2 sets 10x3\" 20# 2 sets 10x3\" 20# 2 sets 10x3\" 20# 2 sets 1x10 20# X10 20#   10x3\" 20#    U low level row 10x3\" 20# 2 sets 10x3\" 20# 2 sets 1x10, 1x8 20#  10x3\" 30#, 7x3\" 30# sets 10x3\" 30#, 7x3\" 30#  2x10 30# 2x10 30#  10x3\" 20#    No money TB 2x10 ytb 2x10 ytb  " "2x10 ytb  2x10 ytb      1x10 ytb  2x10 ytb    Cervical isometrics Flexion/extension 10x3\" ea with VB against wall Flexion/extension/ Lat [id number]\" ea with VB against wall hold hold         TB W  1x10 ytb    2x10 ytb  2x10 ytb  2x10 ytb   1x10 ytb     Ther Ex             Cervical rotational snag             3 finger rotation for Upper c/s rotation             T/S extension c/ foam roller             UBE 3'/3' 3'/3' 3'/3' 3'/3' 3'/3' 3'/3' 3'/3'  3'/3' 3'/3'   Self OA mobs         10x ea for 3\"    L SCM stretch             Cervical extension snag   7x5\" 7x5\" 7x5\"                     Ther Activity             Wall walks         5x ytb                  Education                          Gait Training                                       Modalities                                1:1 with PT from 0815-0823                     "

## 2024-04-26 NOTE — PROGRESS NOTES
"Daily Note     Today's date: 2024  Patient name: Jacobo Bliss  : 1950  MRN: [id number]  Referring provider: Jose Luis Garcia MD  Dx:   Encounter Diagnosis     ICD-10-CM    1. Neck pain  M54.2       2. Headache in back of head  R51.9                      Subjective: Pt reports having a headache which he took 2 advil for. Unfortunately, he received bad news from [facility] regarding his son's cancer diagnosis.       Objective: See treatment diary below      Assessment: Tolerated treatment well. Good relief present with manual treatment. Continued with scap stabilization with good tolerance. No increased discomfort with current plan. He plans to see Daljit Niles for point stimulator treatment for his [place] Palsy after my session today. He notes increased eye dryness and discomfort despite improving function. Patient would benefit from continued PT      Plan: Continue per plan of care. Pt to attend PT 1x per week starting next week.      Precautions: Skin CA, Anxiety, DDD    Manuals 4/24 4/26  3/29 4/3 4/5 4/10 4/12 4/17 4/19   IASTM using LLL 3' L cervical spine region and SHAILESH 3' L cervical spine region and SHAILESH  3' L cervical spine region and SHAILESH 3' L cervical spine region and SHAILESH 3' L cervical spine region and SHAILESH 3' L cervical spine region and SHAILESH 3' L cervical spine region and SHAILESH 3' L cervical spine region and SHAILESH 3' L cervical spine region and SHAILESH   Rotational glides of C/S Gr III Gr III  Gr III Gr III Gr III Gr III Gr III Gr III Gr III   OA mobs             AA mobs             STM to SHAILESH, paraspinals, Temporalis 5' 5'  5' 5'  5' 5' 5' 5' 5'   Neuro Re-Ed             C/s retraction             DNF activation              Scap setting with foam roller             U Mid level row 2x10 20# 2x10 20#  10x3\" 20# 2 sets 10x3\" 20# 2 sets 1x10 20# X10 20#  2x10 20# 12# 30#    U low level row 2x10 20# 2x10 20#  10x3\" 30#, 7x3\" 30# sets 10x3\" 30#, 7x3\" 30#  2x10 30# 2x10 30# 30# 11x 2x10 30#    No money TB    2x10 ytb " "         Cervical isometrics    hold         TB W 2x10 ytb  2x10 ytb    2x10 ytb  2x10 ytb  2x10 ytb  2x10 ytb  2x10 ytb     Ther Ex             Cervical rotational snag Done daily as a HEP            3 finger rotation for Upper c/s rotation             T/S extension c/ foam roller             Levator scap stretch         HEP instruction    UBE 3'/3' 3'/3'  3'/3' 3'/3' 3'/3' 3'/3' 3'/3' 3'/3'    Self OA mobs             L SCM stretch             Cervical extension snag    7x5\" 7x5\"                     Ther Activity             Wall walks             Shoulder flexion with TB abd iso        5x 5x    Education                          Gait Training                                       Modalities             MH                          1:1 with PT from [id number]                                 "

## 2024-05-01 ENCOUNTER — OFFICE VISIT (OUTPATIENT)
Dept: PHYSICAL THERAPY | Facility: CLINIC | Age: 74
End: 2024-05-01
Payer: MEDICARE

## 2024-05-01 DIAGNOSIS — R51.9 HEADACHE IN BACK OF HEAD: ICD-10-CM

## 2024-05-01 DIAGNOSIS — M54.2 NECK PAIN: Primary | ICD-10-CM

## 2024-05-01 PROCEDURE — 97140 MANUAL THERAPY 1/> REGIONS: CPT | Performed by: PHYSICAL THERAPIST

## 2024-05-01 PROCEDURE — 97110 THERAPEUTIC EXERCISES: CPT | Performed by: PHYSICAL THERAPIST

## 2024-05-01 PROCEDURE — 97112 NEUROMUSCULAR REEDUCATION: CPT | Performed by: PHYSICAL THERAPIST

## 2024-05-01 NOTE — PROGRESS NOTES
"Daily Note     Today's date: 2024  Patient name: Jacobo Bliss  : 1950  MRN: [id number]  Referring provider: Jose Luis Garcia MD  Dx:   Encounter Diagnosis     ICD-10-CM    1. Neck pain  M54.2       2. Headache in back of head  R51.9                      Subjective: Pt overall feeling well. Did 2.5 hours of lawn work yesterday placing sod without significant issues. Pt plans to go golfing today.       Objective: See treatment diary below      Assessment: Tolerated treatment well. Implemented weight bearing scapular clocks for UQ strengthening. Min tenderness present around SHAILESH and cervical paraspinals. Joint mobility of cervical spine nearly full today. No increase of pain with program today.  We will decrease POC to 1x per week due to improvements. Patient would benefit from continued PT.       Plan: Continue per plan of care.      Precautions: Skin CA, Anxiety, DDD      Manuals 3/19 3/22 3/26 3/29 4/3 4/5 4/10 4/26 5/1    IASTM using LLL 3' L cervical spine region and SHAILESH 3' L cervical spine region and SHAILESH 3' L cervical spine region and SHAILESH 3' L cervical spine region and SHAILESH 3' L cervical spine region and SHAILESH 3' L cervical spine region and SHAILESH 3' L cervical spine region and SHAILESH  3' L cervical spine region and SHAILESH    Rotational glides of C/S Gr III Gr III Gr III Gr III Gr III Gr III Gr III  Gr III    OA mobs             AA mobs             Point stimulator         Lvl 2 near ear, lateral eyelid, under eyelid, above the eyebrow     STM to SHAILESH, paraspinals, Temporalis 5'  5' 5' 5' 5'  5' 5'  5'    Neuro Re-Ed             C/s retraction             DNF activation              Scap setting with foam roller             U Mid level row 10x3\" 20# 2 sets 10x3\" 20# 2 sets 10x3\" 20# 2 sets 10x3\" 20# 2 sets 10x3\" 20# 2 sets 1x10 20# X10 20#   X10 20#     U low level row 10x3\" 20# 2 sets 10x3\" 20# 2 sets 1x10, 1x8 20#  10x3\" 30#, 7x3\" 30# sets 10x3\" 30#, 7x3\" 30#  2x10 30# 2x10 30#  2x10 30#    No money TB 2x10 ytb " "2x10 ytb  2x10 ytb  2x10 ytb          Cervical isometrics Flexion/extension 10x3\" ea with VB against wall Flexion/extension/ Lat [id number]\" ea with VB against wall hold hold         TB W  1x10 ytb    2x10 ytb  2x10 ytb  2x10 ytb   2x10 ytb     Ther Ex             Cervical rotational snag             3 finger rotation for Upper c/s rotation             T/S extension c/ foam roller             UBE 3'/3' 3'/3' 3'/3' 3'/3' 3'/3' 3'/3' 3'/3'  3'/3'    Self OA mobs             L SCM stretch             Cervical extension snag   7x5\" 7x5\" 7x5\"                     Ther Activity             Wall walks         Scapular clocks with TB 3sets 6 reps ea                 Education                          Gait Training                                       Modalities                                1:1 with PT from [id number]                         "

## 2024-05-03 ENCOUNTER — APPOINTMENT (OUTPATIENT)
Dept: PHYSICAL THERAPY | Facility: CLINIC | Age: 74
End: 2024-05-03
Payer: MEDICARE

## 2024-05-08 ENCOUNTER — APPOINTMENT (OUTPATIENT)
Dept: PHYSICAL THERAPY | Facility: CLINIC | Age: 74
End: 2024-05-08
Payer: MEDICARE

## 2024-05-15 ENCOUNTER — OFFICE VISIT (OUTPATIENT)
Dept: PHYSICAL THERAPY | Facility: CLINIC | Age: 74
End: 2024-05-15
Payer: MEDICARE

## 2024-05-15 DIAGNOSIS — M54.2 NECK PAIN: Primary | ICD-10-CM

## 2024-05-15 DIAGNOSIS — R51.9 HEADACHE IN BACK OF HEAD: ICD-10-CM

## 2024-05-15 PROCEDURE — 97112 NEUROMUSCULAR REEDUCATION: CPT | Performed by: PHYSICAL THERAPIST

## 2024-05-15 PROCEDURE — 97110 THERAPEUTIC EXERCISES: CPT | Performed by: PHYSICAL THERAPIST

## 2024-05-15 PROCEDURE — 97140 MANUAL THERAPY 1/> REGIONS: CPT | Performed by: PHYSICAL THERAPIST

## 2024-05-15 NOTE — PROGRESS NOTES
Daily Note     Today's date: 5/15/2024  Patient name: Jacobo Bliss  : 1950  MRN: [id number]  Referring provider: Jose Luis Garcia MD  Dx:   Encounter Diagnosis     ICD-10-CM    1. Neck pain  M54.2       2. Headache in back of head  R51.9           Start Time: 0730  Stop Time: 0815  Total time in clinic (min): 45 minutes    Subjective: Pt reports having waxing and waning symptoms over the past 2 weeks. However, he has been active with golfing, yard work, gardening, and cleaning the cars. All symptoms are manageable but present.       Objective: See treatment diary below      Assessment: Tolerated treatment well. Trigger point present in right scalene. Also 1st rib region seems hypomobile. Addressed this area with manual therapy. Pt still fatigues with resistance and strength exercises. Pt should look to improve muscle endurance and performance of his upper quarter muscles to fully recover. Patient exhibited good technique with therapeutic exercises and would benefit from continued PT.      Plan: Continue per plan of care. 2 more PT sessions then consider D/C.      Precautions: Skin CA, Anxiety, DDD      Manuals 3/19 3/22 3/26 3/29 4/3 4/5 4/10 4/26 5/1 5/15   IASTM using LLL 3' L cervical spine region and SHAILESH 3' L cervical spine region and SHAILESH 3' L cervical spine region and SHAILESH 3' L cervical spine region and SHAILESH 3' L cervical spine region and SHAILESH 3' L cervical spine region and SHAILESH 3' L cervical spine region and SHAILESH  3' L cervical spine region and SHAILESH 3' L cervical spine region and SHAILESH   Rotational glides of C/S Gr III Gr III Gr III Gr III Gr III Gr III Gr III  Gr III Gr III   OA mobs             AA mobs             Point stimulator         Lvl 2 near ear, lateral eyelid, under eyelid, above the eyebrow     R 1st rib and scalene STM          Gr III    STM to SHAILESH, paraspinals, Temporalis 5'  5' 5' 5' 5'  5' 5'  5' 4'    Neuro Re-Ed             C/s retraction             DNF activation              Scap setting  "with foam roller             U Mid level row 10x3\" 20# 2 sets 10x3\" 20# 2 sets 10x3\" 20# 2 sets 10x3\" 20# 2 sets 10x3\" 20# 2 sets 1x10 20# X10 20#   X10 20#  2x10 20#    U low level row 10x3\" 20# 2 sets 10x3\" 20# 2 sets 1x10, 1x8 20#  10x3\" 30#, 7x3\" 30# sets 10x3\" 30#, 7x3\" 30#  2x10 30# 2x10 30#  2x10 30# 2x10 30#   No money TB 2x10 ytb 2x10 ytb  2x10 ytb  2x10 ytb          Cervical isometrics Flexion/extension 10x3\" ea with VB against wall Flexion/extension/ Lat [id number]\" ea with VB against wall hold hold         TB W  1x10 ytb    2x10 ytb  2x10 ytb  2x10 ytb   2x10 ytb  2x10 ytb    Ther Ex             Cervical rotational snag             3 finger rotation for Upper c/s rotation             T/S extension c/ foam roller             UBE 3'/3' 3'/3' 3'/3' 3'/3' 3'/3' 3'/3' 3'/3'  3'/3' 3'/3'   Self OA mobs          10x ea way 5\"   L SCM stretch             Cervical extension snag   7x5\" 7x5\" 7x5\"        R UT/mid scalene stretch with strap          5x10\" B   Ther Activity             Wall walks         Scapular clocks with TB 3sets 6 reps ea                 Education                          Gait Training                                       Modalities                                1:1 with PT from [phone]                           "

## 2024-05-22 ENCOUNTER — OFFICE VISIT (OUTPATIENT)
Dept: PHYSICAL THERAPY | Facility: CLINIC | Age: 74
End: 2024-05-22
Payer: MEDICARE

## 2024-05-22 DIAGNOSIS — M54.2 NECK PAIN: Primary | ICD-10-CM

## 2024-05-22 DIAGNOSIS — R51.9 HEADACHE IN BACK OF HEAD: ICD-10-CM

## 2024-05-22 PROCEDURE — 97110 THERAPEUTIC EXERCISES: CPT | Performed by: PHYSICAL THERAPIST

## 2024-05-22 PROCEDURE — 97140 MANUAL THERAPY 1/> REGIONS: CPT | Performed by: PHYSICAL THERAPIST

## 2024-05-22 PROCEDURE — 97112 NEUROMUSCULAR REEDUCATION: CPT | Performed by: PHYSICAL THERAPIST

## 2024-05-22 NOTE — PROGRESS NOTES
"Daily Note     Today's date: 2024  Patient name: Jacobo Bliss  : 1950  MRN: [id number]  Referring provider: Jose Luis Garcia MD  Dx:   Encounter Diagnosis     ICD-10-CM    1. Neck pain  M54.2       2. Headache in back of head  R51.9                      Subjective: Pt reports having really bad allergies recently. He went to urgent care to get medication. He has been taking advil due to headache and neck discomfort.       Objective: See treatment diary below      Assessment: Tolerated treatment well. Increased tone and tenderness continues in R UT vs L UT. STM and joint mobilizations helped modulate symptoms. Continued with cervical stretching and UQ strengthening. Patient would benefit from continued PT. Pt will be re-evaluated next session.       Plan: Continue per plan of care.      Precautions: Skin CA, Anxiety, DDD      Manuals 5/22   3/29 4/3 4/5 4/10 4/26 5/1 5/15   IASTM using LLL 3' L cervical spine region and SHAILESH   3' L cervical spine region and SHAILESH 3' L cervical spine region and SHAILESH 3' L cervical spine region and SHAILESH 3' L cervical spine region and SHAILESH  3' L cervical spine region and SHAILESH 3' L cervical spine region and SHAILESH   Rotational glides of C/S Gr III   Gr III Gr III Gr III Gr III  Gr III Gr III   OA mobs             AA mobs             Point stimulator         Lvl 2 near ear, lateral eyelid, under eyelid, above the eyebrow     R 1st rib and scalene STM Gr III         Gr III    STM to SHAILESH, paraspinals, Temporalis 4'    5' 5'  5' 5'  5' 4'    Neuro Re-Ed             C/s retraction             DNF activation              Scap setting with foam roller             U Mid level row 2x10 20#    10x3\" 20# 2 sets 10x3\" 20# 2 sets 1x10 20# X10 20#   X10 20#  2x10 20#    U low level row 2x10 30#   10x3\" 30#, 7x3\" 30# sets 10x3\" 30#, 7x3\" 30#  2x10 30# 2x10 30#  2x10 30# 2x10 30#   No money TB    2x10 ytb          Cervical isometrics    hold         TB W 2x10 ytb     2x10 ytb  2x10 ytb  2x10 ytb   2x10 " "ytb  2x10 ytb    Ther Ex             Cervical rotational snag             3 finger rotation for Upper c/s rotation             T/S extension c/ foam roller             UBE 3'/3'   3'/3' 3'/3' 3'/3' 3'/3'  3'/3' 3'/3'   Self OA mobs          10x ea way 5\"   L SCM stretch             Cervical extension snag    7x5\" 7x5\"        R UT/mid scalene stretch with strap 5x10\" B         5x10\" B   Ther Activity             Wall walks         Scapular clocks with TB 3sets 6 reps ea                 Education                          Gait Training                                       Modalities                                1:1 with PT from [phone]                             "

## 2024-05-29 ENCOUNTER — EVALUATION (OUTPATIENT)
Dept: PHYSICAL THERAPY | Facility: CLINIC | Age: 74
End: 2024-05-29
Payer: MEDICARE

## 2024-05-29 DIAGNOSIS — R51.9 HEADACHE IN BACK OF HEAD: ICD-10-CM

## 2024-05-29 DIAGNOSIS — M54.2 NECK PAIN: Primary | ICD-10-CM

## 2024-05-29 PROCEDURE — 97112 NEUROMUSCULAR REEDUCATION: CPT | Performed by: PHYSICAL THERAPIST

## 2024-05-29 PROCEDURE — 97110 THERAPEUTIC EXERCISES: CPT | Performed by: PHYSICAL THERAPIST

## 2024-05-29 PROCEDURE — 97140 MANUAL THERAPY 1/> REGIONS: CPT | Performed by: PHYSICAL THERAPIST

## 2024-05-29 NOTE — LETTER
May 29, 2024    Jose Luis Garcia MD   [facility]  [address]  [facility] [address]    Patient: Jacobo Bliss   YOB: 1950   Date of Visit: 2024     Encounter Diagnosis     ICD-10-CM    1. Neck pain  M54.2       2. Headache in back of head  R51.9           Dear Dr. Garcia:    Thank you for your recent referral of Jacobo Bliss. Please review the attached evaluation summary from Jacobo's recent visit.     Please verify that you agree with the plan of care by signing the attached order.     If you have any questions or concerns, please do not hesitate to call.     I sincerely appreciate the opportunity to share in the care of one of your patients and hope to have another opportunity to work with you in the near future.       Sincerely,    Hany Guzman, PT      Referring Provider:      I certify that I have read the below Plan of Care and certify the need for these services furnished under this plan of treatment while under my care.                    Jose Luis Garcia MD   [facility]  [address]  [facility] [address]  Via Fax: [phone]          [id number]    Today's date: 2024  Patient name: Jacobo Bliss  : 1950  MRN: [id number]  Referring provider: Jose Luis Garcia MD  Dx:   Encounter Diagnosis     ICD-10-CM    1. Neck pain  M54.2       2. Headache in back of head  R51.9           Start Time: 0730  Stop Time: 0808  Total time in clinic (min): 38 minutes  Assessment  Assessment details: Jacobo Bliss is a 73 y.o. male who presents with signs and symptoms consistent of persistent neck pain with posterior headaches. Pt has been experiencing headaches for 3-4 years. Pt has had 6 PT sessions over the last 6 weeks since last re-evaluation. He reports gradual progression of headaches and neck pain over the course of PT. He is very pleased with his progress. His ROM remains much improved from initial evaluation but still mildly restricted compared to previous re-evaluation. His UQ strength and  scapular strength is gradually improving. Pt still presents with poor ability to complete DNF endurance test (<5 seconds). Due to the chronic nature of symptoms, patient will start to wean for PT and attend 1x every 2 weeks over the next 6 weeks. As long as symptoms stabilize, we will discharge at that time. Thank you for this referral.       Primary movement impairments:   1) Decreased OA and AA joint mobility and ROM -Improving  2) Decreased c/s ROM-Improving  3) Peripheral sensitization to the neck and UQ -Improving    Impairments: abnormal muscle firing, abnormal muscle tone, abnormal or restricted ROM, activity intolerance, lacks appropriate home exercise program, pain with function, poor posture  and poor body mechanics    Goals  Short Term Goals: to be achieved by 4 weeks  1) Patient to be independent with basic HEP.-MET  2) Decrease pain to 3/10 at its worst.-Partially met  3) Increase cervical spine ROM by 5-10 degrees in all deficient planes.-Partially met  4) Increase UE strength by 1/2 MMT grade in all deficient planes.-Partially met  5) Improve joint mobility in cervical spine to normal -Partially met    Long Term Goals: to be achieved by discharge  1) FOTO equal to or greater than expected.-Partially met  2) Patient to be independent with comprehensive HEP.-Partially met  3) Cervical spine ROM WNL all planes to improve a/iadls.-Partially met  4) Increase UE strength to 1 MMT grade in all planes to improve a/iadls.-Partially met  5) Reduce headache intensity by 50%-Partially met      Plan  Patient would benefit from: PT eval and skilled physical therapy  Planned modality interventions: low level laser therapy  Planned therapy interventions: manual therapy, neuromuscular re-education, therapeutic activities, therapeutic exercise and home exercise program  Frequency: 2x per week for 4-6 weeks.  Treatment plan discussed with: patient      Subjective Evaluation    History of Present Illness  Mechanism of  injury: History of Current Injury: Pt transferred to [place] for neck pain and headaches from our [place] PT office. Patient requested use of low level laser therapy with treatment. Pt reports increased frequency of headaches which prompted his decision to seek treatment at our [place] office. Pt has been attending PT of 1 month. However, patient was previously in PT from Sept- Nov of last year. Pt sees a chiro on a regular basis. Pt reports experiencing headaches for 3-4 years. They seem to be worsening.   Headache frequency: No rhyme or reason when it happens.   Pain location/Descriptors: P1: Pain in suboccipital region. P2: pain from upper trap to shoulders   Aggravating factors: Sleeping, driving   Easing factors: Advil helps P2 but not P1  24 HR pattern: Worsen in AM. Pt gets very little sleep at night time.  Pt does take a nap.   Imaging: MRI: 1. Multilevel disc degeneration and cervical spondylosis, greatest at C4-5 and   C5-6.   2. At C4-5, disc ridge complex with uncovertebral arthropathy on the right side   and small central disc herniation. There is mild impression of the ventral   thecal sac with moderate to severe right and mild left neural foraminal   narrowing.   3. At C5-6, disc ridge complex with uncovertebral arthropathy. There is mild   impression of the ventral thecal sac with severe bilateral neural foraminal   narrowing.     Special Questions: Denies any N&T in face are arms. Denies ataxia or balance issues, dysphagia, dysarthria, drop attacks.   Patient goals:  Help manage symptoms.   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     Pt does admit to having anxiety. Pt does take lexopro.   Pt has a wife who has MS and is a caretaker  Has children and grandchildren.   PMH significant for right foot surgery: Removal of bone spurs.   Patient Goals  Patient goals for therapy: increased strength, decreased pain, increased motion and independence with  "ADLs/IADLs    Pain  Pain scale: Mild clench in neck.  At worst pain ratin-6      Diagnostic Tests  MRI studies: normal  Treatments  Current treatment: chiropractic, medication and physical therapy      Objective     Active Range of Motion   Cervical/Thoracic Spine       Cervical  Subcranial protraction:  WFL   Subcranial retraction: Active cervical subcranial retraction: Unable to get head over shoulders.   Restriction level: maximal  Flexion: 74 degrees    Extension: 45 degrees     Left lateral flexion: 55 degrees      Right lateral flexion: 45 degrees      Left rotation: 76 degrees   Right rotation: 80 degrees    with pain    Tests     Additional Tests Details  AA: L: 50 deg, R:54 deg  AA joint mobility: hypomobile  OA joint mobility: hypomobile    Latera glides: WNL  Rotational glides: hypomobile throughout- worse upper c/s    Tenderness in paraspinals and SHAILESH region    MMT:    Lower trap: 4-  Mid trap: 4+  Rhomboid: 4+    Shoulder:  Flexion: 4+  ABD: 4+  ER: 4+  IR: 5  Bicpes: 5  Triceps: 5  Wrist flexion/ext: 5    DNF endurance: 5 seconds prior to fatigue.        Precautions: Skin CA, Anxiety, DDD      Manuals 5/22 5/29  3/29 4/3 4/5 4/10 4/26 5/1 5/15   IASTM using LLL 3' L cervical spine region and SHAILESH 3' L cervical spine region and SHAILESH  3' L cervical spine region and SHAILESH 3' L cervical spine region and SHAILESH 3' L cervical spine region and SHAILESH 3' L cervical spine region and SHAILESH  3' L cervical spine region and SHAILESH 3' L cervical spine region and SHAILESH   Rotational glides of C/S Gr III Gr III  Gr III Gr III Gr III Gr III  Gr III Gr III   OA mobs             AA mobs             Point stimulator         Lvl 2 near ear, lateral eyelid, under eyelid, above the eyebrow     R 1st rib and scalene STM Gr III Gr III        Gr III    DNF endurance  1x5\" next visit and HEP           STM to SHAILESH, paraspinals, Temporalis 4'  4'  5' 5'  5' 5'  5' 4'    Neuro Re-Ed             C/s retraction             DNF activation            " "  Scap setting with foam roller             U Mid level row 2x10 20#    10x3\" 20# 2 sets 10x3\" 20# 2 sets 1x10 20# X10 20#   X10 20#  2x10 20#    U low level row 2x10 30#   10x3\" 30#, 7x3\" 30# sets 10x3\" 30#, 7x3\" 30#  2x10 30# 2x10 30#  2x10 30# 2x10 30#   No money TB    2x10 ytb          Cervical isometrics    hold         TB W 2x10 ytb     2x10 ytb  2x10 ytb  2x10 ytb   2x10 ytb  2x10 ytb    Ther Ex             Cervical rotational snag             3 finger rotation for Upper c/s rotation             T/S extension c/ foam roller             UBE 3'/3' 3'/3'  3'/3' 3'/3' 3'/3' 3'/3'  3'/3' 3'/3'   Self OA mobs          10x ea way 5\"   L SCM stretch             Cervical extension snag    7x5\" 7x5\"        R UT/mid scalene stretch with strap 5x10\" B         5x10\" B   Ther Activity             Wall walks         Scapular clocks with TB 3sets 6 reps ea                 Education                          Gait Training                                       Modalities             MH               Pt was re-evaluated today x 20 minutes    1:1 with PT from [id number]                                               "

## 2024-05-29 NOTE — PROGRESS NOTES
[id number]    Today's date: 2024  Patient name: Jacobo Bliss  : 1950  MRN: [id number]  Referring provider: Jose Luis Garcia MD  Dx:   Encounter Diagnosis     ICD-10-CM    1. Neck pain  M54.2       2. Headache in back of head  R51.9           Start Time: 0730  Stop Time: 0808  Total time in clinic (min): 38 minutes  Assessment  Assessment details: Jacobo Bliss is a 73 y.o. male who presents with signs and symptoms consistent of persistent neck pain with posterior headaches. Pt has been experiencing headaches for 3-4 years. Pt has had 6 PT sessions over the last 6 weeks since last re-evaluation. He reports gradual progression of headaches and neck pain over the course of PT. He is very pleased with his progress. His ROM remains much improved from initial evaluation but still mildly restricted compared to previous re-evaluation. His UQ strength and scapular strength is gradually improving. Pt still presents with poor ability to complete DNF endurance test (<5 seconds). Due to the chronic nature of symptoms, patient will start to wean for PT and attend 1x every 2 weeks over the next 6 weeks. As long as symptoms stabilize, we will discharge at that time. Thank you for this referral.       Primary movement impairments:   1) Decreased OA and AA joint mobility and ROM -Improving  2) Decreased c/s ROM-Improving  3) Peripheral sensitization to the neck and UQ -Improving    Impairments: abnormal muscle firing, abnormal muscle tone, abnormal or restricted ROM, activity intolerance, lacks appropriate home exercise program, pain with function, poor posture  and poor body mechanics    Goals  Short Term Goals: to be achieved by 4 weeks  1) Patient to be independent with basic HEP.-MET  2) Decrease pain to 3/10 at its worst.-Partially met  3) Increase cervical spine ROM by 5-10 degrees in all deficient planes.-Partially met  4) Increase UE strength by 1/2 MMT grade in all deficient planes.-Partially met  5) Improve  joint mobility in cervical spine to normal -Partially met    Long Term Goals: to be achieved by discharge  1) FOTO equal to or greater than expected.-Partially met  2) Patient to be independent with comprehensive HEP.-Partially met  3) Cervical spine ROM WNL all planes to improve a/iadls.-Partially met  4) Increase UE strength to 1 MMT grade in all planes to improve a/iadls.-Partially met  5) Reduce headache intensity by 50%-Partially met      Plan  Patient would benefit from: PT eval and skilled physical therapy  Planned modality interventions: low level laser therapy  Planned therapy interventions: manual therapy, neuromuscular re-education, therapeutic activities, therapeutic exercise and home exercise program  Frequency: 2x per week for 4-6 weeks.  Treatment plan discussed with: patient      Subjective Evaluation    History of Present Illness  Mechanism of injury: History of Current Injury: Pt transferred to [place] for neck pain and headaches from our [place] PT office. Patient requested use of low level laser therapy with treatment. Pt reports increased frequency of headaches which prompted his decision to seek treatment at our [place] office. Pt has been attending PT of 1 month. However, patient was previously in PT from Sept- Nov of last year. Pt sees a chiro on a regular basis. Pt reports experiencing headaches for 3-4 years. They seem to be worsening.   Headache frequency: No rhyme or reason when it happens.   Pain location/Descriptors: P1: Pain in suboccipital region. P2: pain from upper trap to shoulders   Aggravating factors: Sleeping, driving   Easing factors: Advil helps P2 but not P1  24 HR pattern: Worsen in AM. Pt gets very little sleep at night time.  Pt does take a nap.   Imaging: MRI: 1. Multilevel disc degeneration and cervical spondylosis, greatest at C4-5 and   C5-6.   2. At C4-5, disc ridge complex with uncovertebral arthropathy on the right side   and small central disc herniation. There is  mild impression of the ventral   thecal sac with moderate to severe right and mild left neural foraminal   narrowing.   3. At C5-6, disc ridge complex with uncovertebral arthropathy. There is mild   impression of the ventral thecal sac with severe bilateral neural foraminal   narrowing.     Special Questions: Denies any N&T in face are arms. Denies ataxia or balance issues, dysphagia, dysarthria, drop attacks.   Patient goals:  Help manage symptoms.   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     Pt does admit to having anxiety. Pt does take lexopro.   Pt has a wife who has MS and is a caretaker  Has children and grandchildren.   PMH significant for right foot surgery: Removal of bone spurs.   Patient Goals  Patient goals for therapy: increased strength, decreased pain, increased motion and independence with ADLs/IADLs    Pain  Pain scale: Mild clench in neck.  At worst pain ratin-6      Diagnostic Tests  MRI studies: normal  Treatments  Current treatment: chiropractic, medication and physical therapy      Objective     Active Range of Motion   Cervical/Thoracic Spine       Cervical  Subcranial protraction:  WFL   Subcranial retraction: Active cervical subcranial retraction: Unable to get head over shoulders.   Restriction level: maximal  Flexion: 74 degrees    Extension: 45 degrees     Left lateral flexion: 55 degrees      Right lateral flexion: 45 degrees      Left rotation: 76 degrees   Right rotation: 80 degrees    with pain    Tests     Additional Tests Details  AA: L: 50 deg, R:54 deg  AA joint mobility: hypomobile  OA joint mobility: hypomobile    Latera glides: WNL  Rotational glides: hypomobile throughout- worse upper c/s    Tenderness in paraspinals and SHAILESH region    MMT:    Lower trap: 4-  Mid trap: 4+  Rhomboid: 4+    Shoulder:  Flexion: 4+  ABD: 4+  ER: 4+  IR: 5  Bicpes: 5  Triceps: 5  Wrist flexion/ext: 5    DNF endurance: 5 seconds prior to fatigue.  "       Precautions: Skin CA, Anxiety, DDD      Manuals 5/22 5/29  3/29 4/3 4/5 4/10 4/26 5/1 5/15   IASTM using LLL 3' L cervical spine region and SHAILESH 3' L cervical spine region and SHAILESH  3' L cervical spine region and SHAILESH 3' L cervical spine region and SHAILESH 3' L cervical spine region and SHAILESH 3' L cervical spine region and SHAILESH  3' L cervical spine region and SHAILESH 3' L cervical spine region and SHAILESH   Rotational glides of C/S Gr III Gr III  Gr III Gr III Gr III Gr III  Gr III Gr III   OA mobs             AA mobs             Point stimulator         Lvl 2 near ear, lateral eyelid, under eyelid, above the eyebrow     R 1st rib and scalene STM Gr III Gr III        Gr III    DNF endurance  1x5\" next visit and HEP           STM to SHAILESH, paraspinals, Temporalis 4'  4'  5' 5'  5' 5'  5' 4'    Neuro Re-Ed             C/s retraction             DNF activation              Scap setting with foam roller             U Mid level row 2x10 20#    10x3\" 20# 2 sets 10x3\" 20# 2 sets 1x10 20# X10 20#   X10 20#  2x10 20#    U low level row 2x10 30#   10x3\" 30#, 7x3\" 30# sets 10x3\" 30#, 7x3\" 30#  2x10 30# 2x10 30#  2x10 30# 2x10 30#   No money TB    2x10 ytb          Cervical isometrics    hold         TB W 2x10 ytb     2x10 ytb  2x10 ytb  2x10 ytb   2x10 ytb  2x10 ytb    Ther Ex             Cervical rotational snag             3 finger rotation for Upper c/s rotation             T/S extension c/ foam roller             UBE 3'/3' 3'/3'  3'/3' 3'/3' 3'/3' 3'/3'  3'/3' 3'/3'   Self OA mobs          10x ea way 5\"   L SCM stretch             Cervical extension snag    7x5\" 7x5\"        R UT/mid scalene stretch with strap 5x10\" B         5x10\" B   Ther Activity             Wall walks         Scapular clocks with TB 3sets 6 reps ea                 Education                          Gait Training                                       Modalities                            Pt was re-evaluated today x 20 minutes    1:1 with PT from " [phone]

## 2024-06-12 ENCOUNTER — OFFICE VISIT (OUTPATIENT)
Dept: PHYSICAL THERAPY | Facility: CLINIC | Age: 74
End: 2024-06-12
Payer: MEDICARE

## 2024-06-12 DIAGNOSIS — M54.2 NECK PAIN: Primary | ICD-10-CM

## 2024-06-12 DIAGNOSIS — R51.9 HEADACHE IN BACK OF HEAD: ICD-10-CM

## 2024-06-12 PROCEDURE — 97140 MANUAL THERAPY 1/> REGIONS: CPT | Performed by: PHYSICAL THERAPIST

## 2024-06-12 PROCEDURE — 97112 NEUROMUSCULAR REEDUCATION: CPT | Performed by: PHYSICAL THERAPIST

## 2024-06-12 NOTE — PROGRESS NOTES
"Daily Note     Today's date: 2024  Patient name: Jacobo Bliss  : 1950  MRN: [id number]  Referring provider: Jose Luis Garcia MD  Dx:   Encounter Diagnosis     ICD-10-CM    1. Neck pain  M54.2       2. Headache in back of head  R51.9                      Subjective: Pt able to golf 3 days this week without advil. He's complaining of experiencing right wrist pain (medial). Attributes this to playing video games for a length of time.       Objective: See treatment diary below      Assessment: Tolerated treatment well. Performed 10 DNF activation exercise with moderate difficulty. Moderate tone and tenderness present over R UT. No adverse effects of scapular strengthening today, although fatigued. Encouraged self stretching on a daily basis. Patient would benefit from continued PT.       Plan: Continue per plan of care.      Precautions: Skin CA, Anxiety, DDD      Manuals 5/22 5/29 6/12   4/5 4/10 4/26 5/1 5/15   IASTM using LLL 3' L cervical spine region and SHAILESH 3' L cervical spine region and SHAILESH 3' L cervical spine region and SHAILESH   3' L cervical spine region and SHAILESH 3' L cervical spine region and SHAILESH  3' L cervical spine region and SHAILESH 3' L cervical spine region and SHAILESH   Rotational glides of C/S Gr III Gr III Gr III   Gr III Gr III  Gr III Gr III   OA mobs             AA mobs             Point stimulator         Lvl 2 near ear, lateral eyelid, under eyelid, above the eyebrow     R 1st rib and scalene STM Gr III Gr III Gr III       Gr III    DNF endurance  1x5\" next visit and HEP 10x for reps          STM to SHAILESH, paraspinals, Temporalis 4'  4' 4'    5' 5'  5' 4'    Neuro Re-Ed             C/s retraction             DNF activation              Scap setting with foam roller             U Mid level row 2x10 20#      1x10 20# X10 20#   X10 20#  2x10 20#    U low level row 2x10 30#     2x10 30# 2x10 30#  2x10 30# 2x10 30#   No money TB             Cervical isometrics             TB W 2x10 ytb      2x10 ytb  2x10 " "ytb   2x10 ytb  2x10 ytb    Ther Ex             Cervical rotational snag             3 finger rotation for Upper c/s rotation             T/S extension c/ foam roller             UBE 3'/3' 3'/3' 3'/3'   3'/3' 3'/3'  3'/3' 3'/3'   Self OA mobs          10x ea way 5\"   L SCM stretch             Cervical extension snag             R UT/mid scalene stretch with strap 5x10\" B  5x10\" B       5x10\" B   Ther Activity             Wall walks         Scapular clocks with TB 3sets 6 reps ea                 Education                          Gait Training                                       Modalities                              1:1 with PT from [phone]                                 "

## 2024-06-26 ENCOUNTER — OFFICE VISIT (OUTPATIENT)
Dept: PHYSICAL THERAPY | Facility: CLINIC | Age: 74
End: 2024-06-26
Payer: MEDICARE

## 2024-06-26 DIAGNOSIS — M54.2 NECK PAIN: Primary | ICD-10-CM

## 2024-06-26 DIAGNOSIS — R51.9 HEADACHE IN BACK OF HEAD: ICD-10-CM

## 2024-06-26 PROCEDURE — 97110 THERAPEUTIC EXERCISES: CPT | Performed by: PHYSICAL THERAPIST

## 2024-06-26 PROCEDURE — 97140 MANUAL THERAPY 1/> REGIONS: CPT | Performed by: PHYSICAL THERAPIST

## 2024-06-26 PROCEDURE — 97112 NEUROMUSCULAR REEDUCATION: CPT | Performed by: PHYSICAL THERAPIST

## 2024-06-26 NOTE — PROGRESS NOTES
"Daily Note     Today's date: 2024  Patient name: Jacobo Bliss  : 1950  MRN: [id number]  Referring provider: Jose Luis Garcia MD  Dx:   Encounter Diagnosis     ICD-10-CM    1. Neck pain  M54.2       2. Headache in back of head  R51.9                      Subjective: Pt reports having intermittent headaches requiring advil. However, overall, still pleased with his improvement.       Objective: See treatment diary below      Assessment: Moderate trigger point present in R UT. Pt has not been as diligent with his stretches to reduce this muscle tone. Recommended pin and stretch to help decrease muscle tone. Good performance of exercises. Pt able to perform DNF lift today without adverse effects. Tolerated treatment well. Encouraged regular stretching and HEP performance. Pt will follow up with 1 more session in July and then we are planning for D/C to HEP.  Pt in agreement with D/C planning.       Plan: Continue per plan of care.      Precautions: Skin CA, Anxiety, DDD      Manuals 5/22 5/29 6/12 6/26    4/26 5/1 5/15   IASTM using LLL 3' L cervical spine region and SHAILESH 3' L cervical spine region and SHAILESH 3' L cervical spine region and SHAILESH 3' L cervical spine region and SHAILESH     3' L cervical spine region and SHAILESH 3' L cervical spine region and SHAILESH   Rotational glides of C/S Gr III Gr III Gr III Gr III     Gr III Gr III   OA mobs             AA mobs             Point stimulator         Lvl 2 near ear, lateral eyelid, under eyelid, above the eyebrow     R 1st rib and scalene STM Gr III Gr III Gr III Gr III      Gr III    DNF endurance  1x5\" next visit and HEP 10x for reps 10x for reps (Nod and lift)         STM to SHAILESH, paraspinals, Temporalis 4'  4' 4'  4'      5' 4'    Neuro Re-Ed             C/s retraction             DNF activation              Scap setting with foam roller             U Mid level row 2x10 20#    2x10 20#      X10 20#  2x10 20#    U low level row 2x10 30#   2x10 20#      2x10 30# 2x10 30#   No " "money TB             Cervical isometrics             TB W 2x10 ytb    2x10 ytb      2x10 ytb  2x10 ytb    Ther Ex             Cervical rotational snag             3 finger rotation for Upper c/s rotation             T/S extension c/ foam roller             UBE 3'/3' 3'/3' 3'/3' 3'/3'     3'/3' 3'/3'   Self OA mobs          10x ea way 5\"   L SCM stretch             Cervical extension snag             R UT/mid scalene stretch with strap 5x10\" B  5x10\" B 5x10\"  and then 5x10\" with pin and stretch c/ theracane      5x10\" B   Ther Activity             Wall walks         Scapular clocks with TB 3sets 6 reps ea                 Education                          Gait Training                                       Modalities                              1:1 with PT from [id number]                                   "

## 2024-07-10 ENCOUNTER — OFFICE VISIT (OUTPATIENT)
Dept: PHYSICAL THERAPY | Facility: CLINIC | Age: 74
End: 2024-07-10
Payer: MEDICARE

## 2024-07-10 DIAGNOSIS — M54.2 NECK PAIN: Primary | ICD-10-CM

## 2024-07-10 DIAGNOSIS — R51.9 HEADACHE IN BACK OF HEAD: ICD-10-CM

## 2024-07-10 PROCEDURE — 97110 THERAPEUTIC EXERCISES: CPT | Performed by: PHYSICAL THERAPIST

## 2024-07-10 PROCEDURE — 97140 MANUAL THERAPY 1/> REGIONS: CPT | Performed by: PHYSICAL THERAPIST

## 2024-07-10 PROCEDURE — 97112 NEUROMUSCULAR REEDUCATION: CPT | Performed by: PHYSICAL THERAPIST

## 2024-07-10 NOTE — PROGRESS NOTES
"PT-Discharge    Today's date: 7/10/2024  Patient name: Jacobo Bliss  : 1950  MRN: [id number]  Referring provider: Jose Luis Garcia MD  Dx:   Encounter Diagnosis     ICD-10-CM    1. Neck pain  M54.2       2. Headache in back of head  R51.9                      Subjective: Pt reports having a headache this morning but woke up with it. Took 2 advil to control symptoms.       Objective: See treatment diary below      Assessment: Pt has had 2 PT sessions after re-evaluation in late May. He has been attending PT 1x every 2 weeks for status updates. He reports compliance with HEP. He split his program up into stretching exercises, neck specific strengthening exercises, and general conditioning exercises. He performs 1 group of exercises daily with rest days in between as needed. Pt continues to golf. His headaches are still present but not as severely limiting as he once had them.  Reviewed HEP with patient and the importance of general strengthening. Overall, patient's condition is stabilizing. He will be discharged to [facility] at this time. He is welcome to return if the exercises that were prescribed do not continue to help his condition or his condition worsens. Thank you for this referral.       Plan:  Discharge to [facility].      Precautions: Skin CA, Anxiety, DDD      Manuals 5/22 5/29 6/12 6/26 7/10   4/26 5/1 5/15   IASTM using LLL 3' L cervical spine region and SHAILESH 3' L cervical spine region and SHAILESH 3' L cervical spine region and SHAILESH 3' L cervical spine region and SHAILESH 3' L cervical spine region and SHAILESH    3' L cervical spine region and SHAILESH 3' L cervical spine region and SHAILESH   Rotational glides of C/S Gr III Gr III Gr III Gr III Gr III    Gr III Gr III   OA mobs             AA mobs             Point stimulator         Lvl 2 near ear, lateral eyelid, under eyelid, above the eyebrow     R 1st rib and scalene STM Gr III Gr III Gr III Gr III Gr III     Gr III    DNF endurance  1x5\" next visit and HEP 10x for reps 10x for " "reps (Nod and lift)         STM to SHAILESH, paraspinals, Temporalis 4'  4' 4'  4'  4'    5' 4'    Neuro Re-Ed             C/s retraction             DNF activation              Scap setting with foam roller             U Mid level row 2x10 20#    2x10 20#  2x10 20#     X10 20#  2x10 20#    U low level row 2x10 30#   2x10 20#  2x10 20#     2x10 30# 2x10 30#   No money TB             Cervical isometrics             TB W 2x10 ytb    2x10 ytb      2x10 ytb  2x10 ytb    Ther Ex             Cervical rotational snag             3 finger rotation for Upper c/s rotation             T/S extension c/ foam roller             UBE 3'/3' 3'/3' 3'/3' 3'/3' 3'/3'    3'/3' 3'/3'   Self OA mobs          10x ea way 5\"   L SCM stretch             Cervical extension snag             R UT/mid scalene stretch with strap 5x10\" B  5x10\" B 5x10\"  and then 5x10\" with pin and stretch c/ theracane      5x10\" B   Ther Activity             Wall walks         Scapular clocks with TB 3sets 6 reps ea                 Education                          Gait Training                                       Modalities                              1:1 with PT from 815-2g                                     "

## 2024-07-30 NOTE — PROGRESS NOTES
"Daily Note     Today's date: 3/22/2024  Patient name: Jacobo Bliss  : 1950  MRN: [id number]  Referring provider: Jose Luis Garcia MD  Dx:   Encounter Diagnosis     ICD-10-CM    1. Headache in back of head  R51.9       2. Neck pain  M54.2                      Subjective: Pt overall very pleased with progress. He caulked his bathroom and modified it to 4, 45 minute sessions. He denies any headache or pain.       Objective: See treatment diary below      Assessment: Tolerated treatment well. Less tenderness to cervical paraspinals upon palpation. Implemented  lateral flexion isometrics into plan of care. Patient exhibited good technique with therapeutic exercises and would benefit from continued PT.      Plan: Continue per plan of care.      Precautions: Skin CA, Anxiety, DDD    Manuals 3/19 3/22       3/12 3/15   IASTM using LLL 3' L cervical spine region and SHAILESH 3' L cervical spine region and SHAILESH       3' L cervical spine region and SHAILESH 3' L cervical spine region and SHAILESH   Rotational glides of C/S Gr III Gr III       Gr III Gr III   OA mobs             AA mobs             STM to SHAILESH, paraspinals, Temporalis 5'  5'       10' 5'   Neuro Re-Ed             C/s retraction             DNF activation              Scap setting with foam roller             U Mid level row 10x3\" 20# 2 sets 10x3\" 20# 2 sets       10x3\" 20#    U low level row 10x3\" 20# 2 sets 10x3\" 20# 2 sets       10x3\" 20#    No money TB 2x10 ytb 2x10 ytb        1x10 ytb  2x10 ytb    Cervical isometrics Flexion/extension 10x3\" ea with VB against wall Flexion/extension/ Lat [id number]\" ea with VB against wall           TB W  1x10 ytb        1x10 ytb     Ther Ex             Cervical rotational snag             3 finger rotation for Upper c/s rotation             T/S extension c/ foam roller             UBE 3'/3' 3'/3'       3'/3' 3'/3'   Self OA mobs         10x ea for 3\"    L SCM stretch                                       Ther Activity             Wall " walks         5x ytb                  Education                          Gait Training                                       Modalities                                       1:1 with PT from [phone]                36.7

## 2024-09-10 NOTE — PROGRESS NOTES
PT Evaluation     Today's date: 2024  Patient name: Jacobo Bliss  : 1950  MRN: [id number]  Referring provider: Jose Luis Garcia MD  Dx:   Encounter Diagnosis     ICD-10-CM    1. Cervicogenic headache  G44.86                      Assessment  Impairments: abnormal muscle firing, abnormal muscle tone, abnormal or restricted ROM, activity intolerance, pain with function, scapular dyskinesis and poor body mechanics    Assessment details: Jacobo Bliss is a 74 y.o. male who presents with signs and symptoms consistent of chronic cervicogenic headaches that vary in frequency and intensity. Pt was discharge from PT in  this year after his symptoms stabilized. He had good results with low level laser, in conjunction with manual therapy and exercise. However, he requested further treatment after having 2 weeks of consistent headaches without the ability to self regulate. Patient presents with bilateral neck pain, decreased cervical ROM, joint mobility restrictions, and flexibility restrictions. Due to these impairments, Patient has difficulty performing ADLs that involve lifting, driving, prolonged sitting, etc. Patient would benefit from skilled physical therapy to address the impairments, improve their level of function, and to improve their overall quality of life.    Primary movement impairments:   1) Decreased cervical ROM  2) Decreased Upper cervical mobility, including AA and OA joints  3) Decreased endurance of scapular stabilizers.     Barriers to therapy: Chronicity of symptoms, a degree of peripheral sensitization, help wife who has MS and son who recently passed away with cancer.    Understanding of Dx/Px/POC: good     Prognosis: good    Goals  Short Term Goals: to be achieved by 4 weeks  1) Patient to be independent with basic HEP.  2) Decrease pain to 3/10 at its worst.  3) Increase cervical spine ROM by 5-10 degrees in all deficient planes.  4) Increase UE strength by 1/2 MMT grade in all deficient  planes.  5) Improve joint mobility in cervical spine to normal     Long Term Goals: to be achieved by discharge  1) FOTO equal to or greater than expected.  2) Patient to be independent with comprehensive HEP.  3) Cervical spine ROM WNL all planes to improve a/iadls.  4) Increase UE strength to 1 MMT grade in all planes to improve a/iadls.        Plan  Patient would benefit from: skilled physical therapy and PT eval  Planned modality interventions: low level laser therapy    Planned therapy interventions: joint mobilization, IASTM, neuromuscular re-education, patient/caregiver education, therapeutic activities, therapeutic exercise and home exercise program    Frequency: 1x per week for 6 weeks.  Treatment plan discussed with: patient  Plan details: Referral back to neurology if symptoms do not decrease after 6 weeks. Admittedly, patient would like to avoid further medication and injections if he is able. This is why he would like to focus on rehab.       Subjective Evaluation    History of Present Illness  Mechanism of injury: History of Current Injury: Pt returns to PT secondary to chronic headaches, which are cervicogenic in nature. Pt was last discharged in June. He has been able to play golf; however, started to experience headaches on a regular basis which aren't improving with exercise routine and medication regimen (OTC advil/tylenol). Pt would like advice for steps to take going forward. Of note, patient has experienced headaches for 4+ years. They vary in intensity and frequency. Pt saw Dr. Michaels at  neurology when symptoms were less severe. It was recommended he return should symptoms worsen. Pt had MRI in April of last year with results below.  Unfortunately, patient's son passed away last month. Pt has increased lexapro as a result of this.   Headaches frequency: Primarily with golfing (immediately after golf). Pt requires advil (anywhere from 2-6 pills throughout the day). Generally, headaches can  last a least a day.    Pain location/Descriptors: P1: Pain in suboccipital region. P2: pain from upper trap to shoulders. Pain radiates together.   Aggravating factors: Sleeping, driving, golfing   Easing factors: Advil helps less intensity  24 HR pattern: Worsen in AM. Pt gets very little sleep at night time.  Pt does take a nap.     Imaging: MRI: 1. Multilevel disc degeneration and cervical spondylosis, greatest at C4-5 and   C5-6.   2. At C4-5, disc ridge complex with uncovertebral arthropathy on the right side   and small central disc herniation. There is mild impression of the ventral   thecal sac with moderate to severe right and mild left neural foraminal   narrowing.   3. At C5-6, disc ridge complex with uncovertebral arthropathy. There is mild   impression of the ventral thecal sac with severe bilateral neural foraminal   narrowing.      Special Questions: Denies any N&T in face are arms. Denies ataxia or balance issues, dysphagia, dysarthria, drop attacks.   Patient goals:  Help manage symptoms.   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   Pt lost 7lbs of wanted weight loss.      Pt does admit to having anxiety. Pt does take lexapro after his son passed away last month.   Pt has a wife who has MS and is a caretaker  Has grandchildren.         Patient Goals  Patient goals for therapy: decreased pain, increased motion, increased strength and independence with ADLs/IADLs    Pain  Current pain ratin  At worst pain ratin (Headaches can range from 4-8)      Diagnostic Tests  MRI studies: normal  Treatments  Previous treatment: medication and physical therapy  Current treatment: medication  Current treatment comments: home exercises.       Objective     Neurological Testing     Sensation   Cervical/Thoracic   Left   Intact: light touch    Right   Intact: light touch    Reflexes   Left   Biceps (C5/C6): normal (2+)  Brachioradialis (C6): normal (2+)  Triceps  (C7): normal (2+)  Edwards's reflex: negative    Right   Biceps (C5/C6): normal (2+)  Brachioradialis (C6): normal (2+)  Triceps (C7): normal (2+)  Edwards's reflex: negative    Active Range of Motion   Cervical/Thoracic Spine       Cervical  Subcranial protraction:  WFL   Subcranial retraction: Active cervical subcranial retraction: Unable to reach neutral.   Flexion: 70 degrees   Extension: 30 degrees      Left lateral flexion: 30 degrees      Right lateral flexion: 35 degrees      Left rotation: 55 degrees  Right rotation: 65 degrees       Additional Active Range of Motion Details  AA ROM: 38 B       Strength/Myotome Testing   Cervical Spine     Left   Interossei strength (t1): 5    Right   Interossei strength (t1): 5    Left Shoulder     Planes of Motion   Flexion: 5   Abduction: 4+   External rotation at 0°: 4+     Right Shoulder     Planes of Motion   Flexion: 5   Abduction: 4+   External rotation at 0°: 4+     Left Elbow   Flexion: 5  Extension: 5    Right Elbow   Flexion: 5  Extension: 5    Left Wrist/Hand   Wrist extension: 5  Wrist flexion: 5  Thumb extension: 5    Right Wrist/Hand   Wrist extension: 5  Wrist flexion: 5  Thumb extension: 5    Tests   Cervical   Negative alar ligament test, Sharp-Kerline test, transverse ligament test and VBI.     Additional Tests Details  *AA mobility: 38 deg bilaterally  *Tenderness in Subocciptal muscle  *Moderate limitation in OA bilaterally   *Moderate limitation at AA joint    C/S Lateral glides: mild hypomobility at lower c/s  C/S rotational mobs: Mild hypomobility at upper c/s             Precautions:  Skin CA, Anxiety, DDD, Chronic headaches      Manuals             SOR with traction             IASTM using LLL to SHAILESH + trapezius             STM to upper trap             OA mobilizations             Neuro Re-Ed             Seated c/s retraction             Lower trap setting & activation                                                                                Ther Ex             UBE             Cervical snag rotation             UT stretch              SHAILESH stretch              Foam roller thoracic decompression             Quad thoracic rotation                                       Ther Activity             Wall push up with plus                           Gait Training                                       Modalities                                          HEP: TB Ws, cervical OA self mobs, Cervical rotations with towel

## 2024-09-12 ENCOUNTER — OFFICE VISIT (OUTPATIENT)
Dept: PHYSICAL THERAPY | Facility: CLINIC | Age: 74
End: 2024-09-12
Payer: MEDICARE

## 2024-09-12 DIAGNOSIS — G44.86 CERVICOGENIC HEADACHE: Primary | ICD-10-CM

## 2024-09-12 PROCEDURE — 97162 PT EVAL MOD COMPLEX 30 MIN: CPT | Performed by: PHYSICAL THERAPIST

## 2024-09-12 PROCEDURE — 97110 THERAPEUTIC EXERCISES: CPT | Performed by: PHYSICAL THERAPIST

## 2024-09-12 NOTE — LETTER
2024    Jose Luis Garcia MD   [facility] [address]  [facility] [address]    Patient: Jacobo Bliss   YOB: 1950   Date of Visit: 2024     Encounter Diagnosis     ICD-10-CM    1. Cervicogenic headache  G44.86           Dear Dr. Garcia:    Jacobo Bliss returned to physical therapy for chronic cervicogenic headaches. He would like to resume PT, as he had good relief in the past  Please review the attached evaluation summary from Jacobo's recent visit.     Please verify that you agree with the plan of care by signing the attached order.     If you have any questions or concerns, please do not hesitate to call.     I sincerely appreciate the opportunity to share in the care of one of your patients and hope to have another opportunity to work with you in the near future.       Sincerely,    Hany Guzman, PT      Referring Provider:      I certify that I have read the below Plan of Care and certify the need for these services furnished under this plan of treatment while under my care.                    Jose Luis Garcia MD   [facility] [address]  [facility] [address]  Via Fax: [phone]          PT Evaluation     Today's date: 2024  Patient name: Jacobo Bliss  : 1950  MRN: [id number]  Referring provider: Jose Luis Garcia MD  Dx:   Encounter Diagnosis     ICD-10-CM    1. Cervicogenic headache  G44.86                      Assessment  Impairments: abnormal muscle firing, abnormal muscle tone, abnormal or restricted ROM, activity intolerance, pain with function, scapular dyskinesis and poor body mechanics    Assessment details: Jacobo Bliss is a 74 y.o. male who presents with signs and symptoms consistent of chronic cervicogenic headaches that vary in frequency and intensity. Pt was discharge from PT in  this year after his symptoms stabilized. He had good results with low level laser, in conjunction with manual therapy and exercise. However, he requested further treatment  after having 2 weeks of consistent headaches without the ability to self regulate. Patient presents with bilateral neck pain, decreased cervical ROM, joint mobility restrictions, and flexibility restrictions. Due to these impairments, Patient has difficulty performing ADLs that involve lifting, driving, prolonged sitting, etc. Patient would benefit from skilled physical therapy to address the impairments, improve their level of function, and to improve their overall quality of life.    Primary movement impairments:   1) Decreased cervical ROM  2) Decreased Upper cervical mobility, including AA and OA joints  3) Decreased endurance of scapular stabilizers.     Barriers to therapy: Chronicity of symptoms, a degree of peripheral sensitization, help wife who has MS and son who recently passed away with cancer.    Understanding of Dx/Px/POC: good     Prognosis: good    Goals  Short Term Goals: to be achieved by 4 weeks  1) Patient to be independent with basic HEP.  2) Decrease pain to 3/10 at its worst.  3) Increase cervical spine ROM by 5-10 degrees in all deficient planes.  4) Increase UE strength by 1/2 MMT grade in all deficient planes.  5) Improve joint mobility in cervical spine to normal     Long Term Goals: to be achieved by discharge  1) FOTO equal to or greater than expected.  2) Patient to be independent with comprehensive HEP.  3) Cervical spine ROM WNL all planes to improve a/iadls.  4) Increase UE strength to 1 MMT grade in all planes to improve a/iadls.        Plan  Patient would benefit from: skilled physical therapy and PT eval  Planned modality interventions: low level laser therapy    Planned therapy interventions: joint mobilization, IASTM, neuromuscular re-education, patient/caregiver education, therapeutic activities, therapeutic exercise and home exercise program    Frequency: 1x per week for 6 weeks.  Treatment plan discussed with: patient  Plan details: Referral back to neurology if symptoms do  not decrease after 6 weeks. Admittedly, patient would like to avoid further medication and injections if he is able. This is why he would like to focus on rehab.       Subjective Evaluation    History of Present Illness  Mechanism of injury: History of Current Injury: Pt returns to PT secondary to chronic headaches, which are cervicogenic in nature. Pt was last discharged in June. He has been able to play golf; however, started to experience headaches on a regular basis which aren't improving with exercise routine and medication regimen (OTC advil/tylenol). Pt would like advice for steps to take going forward. Of note, patient has experienced headaches for 4+ years. They vary in intensity and frequency. Pt saw Dr. Michaels at  neurology when symptoms were less severe. It was recommended he return should symptoms worsen. Pt had MRI in April of last year with results below.  Unfortunately, patient's son passed away last month. Pt has increased lexapro as a result of this.   Headaches frequency: Primarily with golfing (immediately after golf). Pt requires advil (anywhere from 2-6 pills throughout the day). Generally, headaches can last a least a day.    Pain location/Descriptors: P1: Pain in suboccipital region. P2: pain from upper trap to shoulders. Pain radiates together.   Aggravating factors: Sleeping, driving, golfing   Easing factors: Advil helps less intensity  24 HR pattern: Worsen in AM. Pt gets very little sleep at night time.  Pt does take a nap.     Imaging: MRI: 1. Multilevel disc degeneration and cervical spondylosis, greatest at C4-5 and   C5-6.   2. At C4-5, disc ridge complex with uncovertebral arthropathy on the right side   and small central disc herniation. There is mild impression of the ventral   thecal sac with moderate to severe right and mild left neural foraminal   narrowing.   3. At C5-6, disc ridge complex with uncovertebral arthropathy. There is mild   impression of the ventral thecal sac  with severe bilateral neural foraminal   narrowing.      Special Questions: Denies any N&T in face are arms. Denies ataxia or balance issues, dysphagia, dysarthria, drop attacks.   Patient goals:  Help manage symptoms.   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   Pt lost 7lbs of wanted weight loss.      Pt does admit to having anxiety. Pt does take lexapro after his son passed away last month.   Pt has a wife who has MS and is a caretaker  Has grandchildren.         Patient Goals  Patient goals for therapy: decreased pain, increased motion, increased strength and independence with ADLs/IADLs    Pain  Current pain ratin  At worst pain ratin (Headaches can range from 4-8)      Diagnostic Tests  MRI studies: normal  Treatments  Previous treatment: medication and physical therapy  Current treatment: medication  Current treatment comments: home exercises.       Objective     Neurological Testing     Sensation   Cervical/Thoracic   Left   Intact: light touch    Right   Intact: light touch    Reflexes   Left   Biceps (C5/C6): normal (2+)  Brachioradialis (C6): normal (2+)  Triceps (C7): normal (2+)  Edwards's reflex: negative    Right   Biceps (C5/C6): normal (2+)  Brachioradialis (C6): normal (2+)  Triceps (C7): normal (2+)  Edwards's reflex: negative    Active Range of Motion   Cervical/Thoracic Spine       Cervical  Subcranial protraction:  WFL   Subcranial retraction: Active cervical subcranial retraction: Unable to reach neutral.   Flexion: 70 degrees   Extension: 30 degrees      Left lateral flexion: 30 degrees      Right lateral flexion: 35 degrees      Left rotation: 55 degrees  Right rotation: 65 degrees       Additional Active Range of Motion Details  AA ROM: 38 B       Strength/Myotome Testing   Cervical Spine     Left   Interossei strength (t1): 5    Right   Interossei strength (t1): 5    Left Shoulder     Planes of Motion   Flexion: 5   Abduction: 4+    External rotation at 0°: 4+     Right Shoulder     Planes of Motion   Flexion: 5   Abduction: 4+   External rotation at 0°: 4+     Left Elbow   Flexion: 5  Extension: 5    Right Elbow   Flexion: 5  Extension: 5    Left Wrist/Hand   Wrist extension: 5  Wrist flexion: 5  Thumb extension: 5    Right Wrist/Hand   Wrist extension: 5  Wrist flexion: 5  Thumb extension: 5    Tests   Cervical   Negative alar ligament test, Sharp-Kerline test, transverse ligament test and VBI.     Additional Tests Details  *AA mobility: 38 deg bilaterally  *Tenderness in Subocciptal muscle  *Moderate limitation in OA bilaterally   *Moderate limitation at AA joint    C/S Lateral glides: mild hypomobility at lower c/s  C/S rotational mobs: Mild hypomobility at upper c/s             Precautions:  Skin CA, Anxiety, DDD, Chronic headaches      Manuals             SOR with traction             IASTM using LLL to SHAILESH + trapezius             STM to upper trap             OA mobilizations             Neuro Re-Ed             Seated c/s retraction             Lower trap setting & activation                                                                               Ther Ex             UBE             Cervical snag rotation             UT stretch              SHAILESH stretch              Foam roller thoracic decompression             Quad thoracic rotation                                       Ther Activity             Wall push up with plus                           Gait Training                                       Modalities                                          HEP: TB Ws, cervical OA self mobs, Cervical rotations with towel

## 2024-09-25 ENCOUNTER — OFFICE VISIT (OUTPATIENT)
Dept: PHYSICAL THERAPY | Facility: CLINIC | Age: 74
End: 2024-09-25
Payer: MEDICARE

## 2024-09-25 DIAGNOSIS — G44.86 CERVICOGENIC HEADACHE: Primary | ICD-10-CM

## 2024-09-25 PROCEDURE — 97140 MANUAL THERAPY 1/> REGIONS: CPT | Performed by: PHYSICAL THERAPIST

## 2024-09-25 PROCEDURE — 97110 THERAPEUTIC EXERCISES: CPT | Performed by: PHYSICAL THERAPIST

## 2024-09-25 NOTE — PROGRESS NOTES
"Daily Note     Today's date: 2024  Patient name: Jacobo Bliss  : 1950  MRN: [id number]  Referring provider: Jose Luis Garcia MD  Dx:   Encounter Diagnosis     ICD-10-CM    1. Cervicogenic headache  G44.86                      Subjective: Pt doing better than 2 weeks ago. Pt was able to golf without much medication the other day.       Objective: See treatment diary below      Assessment: Moderate tenderness present in bilateral UT region. Initiated IASTM using LLL. Followed up manual therapy with self stretching and mobilization. Tolerated treatment well. Patient exhibited good technique with therapeutic exercises and would benefit from continued PT. Cueing provided with exercises for correct performance.       Plan: Continue per plan of care.      Precautions:  Skin CA, Anxiety, DDD, Chronic headaches      Manuals             SOR with traction JK            IASTM using LLL to SHAILESH + trapezius 3:23 to B UT            STM to upper trap  JK            OA mobilizations             Neuro Re-Ed             Seated c/s retraction             Lower trap setting & activation                                                                               Ther Ex             UBE 3'/3'            Cervical snag rotation 10x on ea for 5\"            UT stretch              SHAILESH stretch              Foam roller thoracic decompression             Quad thoracic rotation 10x on ea            Self OA mob  4x on ea for 5\" holds            Cat/cow 10x             Ther Activity             Wall push up with plus                           Gait Training                                       Modalities                                       1:1 with PT from [id number]       "

## 2024-10-01 ENCOUNTER — OFFICE VISIT (OUTPATIENT)
Dept: PHYSICAL THERAPY | Facility: CLINIC | Age: 74
End: 2024-10-01
Payer: MEDICARE

## 2024-10-01 DIAGNOSIS — G44.86 CERVICOGENIC HEADACHE: Primary | ICD-10-CM

## 2024-10-01 PROCEDURE — 97140 MANUAL THERAPY 1/> REGIONS: CPT | Performed by: PHYSICAL THERAPIST

## 2024-10-01 PROCEDURE — 97110 THERAPEUTIC EXERCISES: CPT | Performed by: PHYSICAL THERAPIST

## 2024-10-01 NOTE — PROGRESS NOTES
"Daily Note     Today's date: 10/1/2024  Patient name: Jacobo Bliss  : 1950  MRN: [id number]  Referring provider: Jose Luis Garcia MD  Dx:   Encounter Diagnosis     ICD-10-CM    1. Cervicogenic headache  G44.86                      Subjective: Pt reports feeling better since resume PT and stretching program. He was able to golf the other day and completed stretches after he felt a headache start.       Objective: See treatment diary below      Assessment: Tolerated treatment well. Tenderness continues at base of cervical spine and suboccipital region. Continued with exercises that address cervicothoracic region. Patient exhibited good technique with therapeutic exercises and would benefit from continued PT.      Plan: Continue per plan of care.      Precautions:  Skin CA, Anxiety, DDD, Chronic headaches      Manuals 9/25 10/1           SOR with traction ELENI KNOWLES           IASTM using LLL to SHAILESH + trapezius 3:23 to B UT 3' to B UT           STM to upper trap  JK ELENI           OA mobilizations             Neuro Re-Ed             Seated c/s retraction             Lower trap setting & activation                                                                               Ther Ex             UBE 3'/3' 3'/3'           Cervical snag rotation 10x on ea for 5\" 10x on ea for 5\"           UT stretch              SHAILESH stretch              Foam roller thoracic decompression             Quad thoracic rotation 10x on ea 10x on c/ foam roller           Self OA mob  4x on ea for 5\" holds 4x on ea for 5\" holds           Cat/cow 10x  10x           Ther Activity             Wall push up with plus   6x                         Gait Training                                       Modalities                                       1:1 with PT from [id number]         "

## 2024-10-08 ENCOUNTER — OFFICE VISIT (OUTPATIENT)
Dept: PHYSICAL THERAPY | Facility: CLINIC | Age: 74
End: 2024-10-08
Payer: MEDICARE

## 2024-10-08 DIAGNOSIS — G44.86 CERVICOGENIC HEADACHE: Primary | ICD-10-CM

## 2024-10-08 PROCEDURE — 97140 MANUAL THERAPY 1/> REGIONS: CPT | Performed by: PHYSICAL THERAPIST

## 2024-10-08 PROCEDURE — 97530 THERAPEUTIC ACTIVITIES: CPT | Performed by: PHYSICAL THERAPIST

## 2024-10-08 PROCEDURE — 97110 THERAPEUTIC EXERCISES: CPT | Performed by: PHYSICAL THERAPIST

## 2024-10-08 NOTE — PROGRESS NOTES
"Daily Note     Today's date: 10/8/2024  Patient name: Jacobo Bliss  : 1950  MRN: [id number]  Referring provider: Jose Luis Garcia MD  Dx:   Encounter Diagnosis     ICD-10-CM    1. Cervicogenic headache  G44.86                      Subjective: Pt reports having tenderness in right upper trap region this morning. He is also considering changing his sleeping position with elevating his feet to help with is overall posture.       Objective: See treatment diary below      Assessment: Tolerated treatment well.  Implemented overhead press into plan of care. Discussed the importance of strengthening. Discussed different symptom modulators to help manage the overall tightness and trigger points: heat, TENS, Theragun, etc. Pt fatigued quickly with over head activity. Recommend working on this daily to help increase strength is this region.       Plan: Continue per plan of care.      Precautions:  Skin CA, Anxiety, DDD, Chronic headaches      Manuals 9/25 10/1 10/8          SOR with traction ELENI KNOWLES          IASTM using LLL to SHAILESH + trapezius 3:23 to B UT 3' to B UT 3' to B UT          STM to upper trap  ELENI KNOWLES          OA mobilizations             Neuro Re-Ed             Seated c/s retraction             Lower trap setting & activation                                                                               Ther Ex             UBE 3'/3' 3'/3' 3'/3'          Cervical snag rotation 10x on ea for 5\" 10x on ea for 5\"           UT stretch    5x10\"          LS stretch   5x10\"           SHAILESH stretch              Foam roller thoracic decompression             Quad thoracic rotation 10x on ea 10x on c/ foam roller           Self OA mob  4x on ea for 5\" holds 4x on ea for 5\" holds 4x on ea for 5\" holds          Cat/cow 10x  10x           Ther Activity             Wall push up with plus   6x            Over head press   2x6 c/ 3# dbs           Gait Training                                       Modalities                     "                   1:1 with PT from [id number]

## 2024-10-16 ENCOUNTER — OFFICE VISIT (OUTPATIENT)
Dept: PHYSICAL THERAPY | Facility: CLINIC | Age: 74
End: 2024-10-16
Payer: MEDICARE

## 2024-10-16 DIAGNOSIS — G44.86 CERVICOGENIC HEADACHE: Primary | ICD-10-CM

## 2024-10-16 PROCEDURE — 97110 THERAPEUTIC EXERCISES: CPT | Performed by: PHYSICAL THERAPIST

## 2024-10-16 PROCEDURE — 97140 MANUAL THERAPY 1/> REGIONS: CPT | Performed by: PHYSICAL THERAPIST

## 2024-10-16 PROCEDURE — 97530 THERAPEUTIC ACTIVITIES: CPT | Performed by: PHYSICAL THERAPIST

## 2024-10-16 NOTE — PROGRESS NOTES
"Daily Note     Today's date: 10/16/2024  Patient name: Jacobo Bliss  : 1950  MRN: [id number]  Referring provider: Jose Luis Garcia MD  Dx:   Encounter Diagnosis     ICD-10-CM    1. Cervicogenic headache  G44.86                      Subjective: Pt reports having a mild 3 day ache, not needing advil. He has been taking tylenol and reports 60% near the occiput and 40% near the base of the shoulders at the upper trapeizus level.       Objective: See treatment diary below      Assessment: Tolerated treatment well. Patient exhibited good technique with therapeutic exercises, mild cueing required with OA self mobilization (encouraging chin tuck vs c/s extension). Resumed thoracic mobility exercises.       Plan: Continue per plan of care.      Precautions:  Skin CA, Anxiety, DDD, Chronic headaches      Manuals 9/25 10/1 10/8 10/16         SOR with traction ELENI KNOWLES         IASTM using LLL to SHAILESH + trapezius 3:23 to B UT 3' to B UT 3' to B UT 3' to B UT         STM to upper trap  ELENI KNOWLES         OA mobilizations             Neuro Re-Ed             Seated c/s retraction             Lower trap setting & activation                                                                               Ther Ex             UBE 3'/3' 3'/3' 3'/3' 3'/3'         Cervical snag rotation 10x on ea for 5\" 10x on ea for 5\"           UT stretch    5x10\" 8x5\"         LS stretch   5x10\"  8x5\" B         SHAILESH stretch              Foam roller thoracic decompression             Quad thoracic rotation 10x on ea 10x on c/ foam roller  Hand on head rot in and out x 5 on ea         Self OA mob  4x on ea for 5\" holds 4x on ea for 5\" holds 4x on ea for 5\" holds 10x on ea for 5\" holds         Cat/cow 10x  10x  10x         Ther Activity             Wall push up with plus   6x            Over head press   2x6 c/ 3# dbs  1x10, 1x6 c/ 3# dbs          Gait Training                                       Modalities                                       1:1 " with PT from [id number]

## 2024-10-29 ENCOUNTER — OFFICE VISIT (OUTPATIENT)
Dept: PHYSICAL THERAPY | Facility: CLINIC | Age: 74
End: 2024-10-29
Payer: MEDICARE

## 2024-10-29 DIAGNOSIS — G44.86 CERVICOGENIC HEADACHE: Primary | ICD-10-CM

## 2024-10-29 PROCEDURE — 97530 THERAPEUTIC ACTIVITIES: CPT | Performed by: PHYSICAL THERAPIST

## 2024-10-29 PROCEDURE — 97110 THERAPEUTIC EXERCISES: CPT | Performed by: PHYSICAL THERAPIST

## 2024-10-29 PROCEDURE — 97140 MANUAL THERAPY 1/> REGIONS: CPT | Performed by: PHYSICAL THERAPIST

## 2024-10-29 NOTE — PROGRESS NOTES
"Daily Note     Today's date: 10/29/2024  Patient name: Jacobo Bliss  : 1950  MRN: [id number]  Referring provider: Jose Luis Garcia MD  Dx:   Encounter Diagnosis     ICD-10-CM    1. Cervicogenic headache  G44.86                      Subjective: Pt reports the ability to golf 3 times last week. He also plans on golfing 3 times this week as well. Notes to have an early bob time after PT. He denies any significant headache reproduction or discomfort. He has been working on his HEP.       Objective: See treatment diary below      Assessment: Tolerated treatment well. R UT more symptomatic than L UT; however, left felt more restricted upon palpation. Patient exhibited good technique with therapeutic exercises but does fatigue quickly with resistance training.      Plan: Continue per plan of care. Consider D/C after next session.      Precautions:  Skin CA, Anxiety, DDD, Chronic headaches      Manuals 9/25 10/1 10/8 10/16 10/29        SOR with traction ELENI KNOWLES        IASTM using LLL to SHAILESH + trapezius 3:23 to B UT 3' to B UT 3' to B UT 3' to B UT 3' to B UT        STM to upper trap  JJEANIE KNOWLES JJEANIE         OA mobilizations             Neuro Re-Ed             Seated c/s retraction             Lower trap setting & activation                                                                               Ther Ex             UBE 3'/3' 3'/3' 3'/3' 3'/3' 3'/3'        Cervical snag rotation 10x on ea for 5\" 10x on ea for 5\"           UT stretch    5x10\" 8x5\" 8x5\" B        LS stretch   5x10\"  8x5\" B 8x5\" B        SHAILESH stretch              Foam roller thoracic decompression             Quad thoracic rotation 10x on ea 10x on c/ foam roller  Hand on head rot in and out x 5 on ea         Self OA mob  4x on ea for 5\" holds 4x on ea for 5\" holds 4x on ea for 5\" holds 10x on ea for 5\" holds 10x on ea for 5\" holds        Cat/cow 10x  10x  10x         Ther Activity             Wall push up with plus   6x            Over head press "   2x6 c/ 3# dbs  1x10, 1x6 c/ 3# dbs  1x10, 1x7 c/ 3# dbs         Gait Training                                       Modalities                                       1:1 with PT from [phone]

## 2024-11-08 ENCOUNTER — OFFICE VISIT (OUTPATIENT)
Dept: PHYSICAL THERAPY | Facility: CLINIC | Age: 74
End: 2024-11-08
Payer: MEDICARE

## 2024-11-08 DIAGNOSIS — G44.86 CERVICOGENIC HEADACHE: Primary | ICD-10-CM

## 2024-11-08 PROCEDURE — 97140 MANUAL THERAPY 1/> REGIONS: CPT | Performed by: PHYSICAL THERAPIST

## 2024-11-08 PROCEDURE — 97530 THERAPEUTIC ACTIVITIES: CPT | Performed by: PHYSICAL THERAPIST

## 2024-11-08 PROCEDURE — 97110 THERAPEUTIC EXERCISES: CPT | Performed by: PHYSICAL THERAPIST

## 2024-11-08 NOTE — PROGRESS NOTES
"Daily Note     Today's date: 2024  Patient name: Jacobo Bliss  : 1950  MRN: [id number]  Referring provider: Jose Luis Garcia MD  Dx:   Encounter Diagnosis     ICD-10-CM    1. Cervicogenic headache  G44.86                      Subjective: Pt reports having a mild headache this morning. Reports symptoms are at the occiput. He wants to learn exercises to strengthen his pectoralis exercises. He notes the ability to still golf this week. He hasn't taken any medications for the headache this morning.       Objective: See treatment diary below      Assessment: Tolerated treatment well. Implemented incline flys for pectoralis and upper quarter strengthening. Patient demonstrated fatigue post treatment and would benefit from continued PT. Pt remains very compliant with HEP. I will formally re-assess Jacobo next session.       Plan: Continue per plan of care. Re-evaluate next session.      Precautions:  Skin CA, Anxiety, DDD, Chronic headaches      Manuals 9/25 10/1 10/8 10/16 10/29 11/8       SOR with traction ELENI KNOWLES       IASTM using LLL to SHAILESH + trapezius 3:23 to B UT 3' to B UT 3' to B UT 3' to B UT 3' to B UT 3' to B UT       STM to upper trap  ELENI KNOWLES  JJEANIE        OA mobilizations             Neuro Re-Ed             Seated c/s retraction             Lower trap setting & activation                                                                               Ther Ex             UBE 3'/3' 3'/3' 3'/3' 3'/3' 3'/3' 3'/3'       Cervical snag rotation 10x on ea for 5\" 10x on ea for 5\"           UT stretch    5x10\" 8x5\" 8x5\" B        LS stretch   5x10\"  8x5\" B 8x5\" B        SHAILESH stretch              Foam roller thoracic decompression             Quad thoracic rotation 10x on ea 10x on c/ foam roller  Hand on head rot in and out x 5 on ea         Self OA mob  4x on ea for 5\" holds 4x on ea for 5\" holds 4x on ea for 5\" holds 10x on ea for 5\" holds 10x on ea for 5\" holds        Cat/cow 10x  10x  10x       "   Ther Activity             Wall push up with plus   6x            Pec flys at incline bench      2x10 3# dbs       Standing db chest fly      2x10 2#       Over head press   2x6 c/ 3# dbs  1x10, 1x6 c/ 3# dbs  1x10, 1x7 c/ 3# dbs         Gait Training                                       Modalities                                       1:1 with PT from [phone]

## 2024-11-25 NOTE — PROGRESS NOTES
[id number] (Hold)     Today's date: 2024  Patient name: Jacobo Bliss  : 1950  MRN: [id number]  Referring provider: Jose Luis Garcia MD  Dx:   Encounter Diagnosis     ICD-10-CM    1. Cervicogenic headache  G44.86                    Assessment  Impairments: abnormal muscle firing, abnormal muscle tone, abnormal or restricted ROM, activity intolerance, pain with function, scapular dyskinesis and poor body mechanics    Assessment details: Jacobo Bliss is a 74 y.o. male who presents with signs and symptoms consistent of chronic cervicogenic headaches that vary in frequency and intensity. Pt has been attending limited PT sessions with overall positive progress. Unfortunately, pt notes that he did wake up with a headache this morning that radiates from his occiput to bilateral upper trapezius muscles. However, its managed with OTC medication and stretching.  All primary movement impairments gradually improving. Pt still presenting with frequent and variable headaches. Cervical ROM has improved in all planes of motion including Upper cervical ROM and mobility. Strength in UQ also gradually improving. Due to the persistent nature of this condition, full linear progression is unlikely. At this time, patient will be put on hold from PT. Recommend he continue to with HEP which he has demonstrated compliance. Pt is welcome to return if symptoms worsen. However, he does not feel the need to pursue further medical intervention at this time, besides continuing with HEP.     Primary movement impairments:   1) Decreased cervical ROM-Improving  2) Decreased Upper cervical mobility, including AA and OA joints- Improving  3) Decreased endurance of scapular stabilizers. - Improving    Barriers to therapy: Chronicity of symptoms, a degree of peripheral sensitization, help wife who has MS and son who recently passed away with cancer.    Understanding of Dx/Px/POC: good     Prognosis: good    Goals  Short Term Goals: to be  achieved by 4 weeks  1) Patient to be independent with basic HEP.-MET  2) Decrease pain to 3/10 at its worst.-_Not met  3) Increase cervical spine ROM by 5-10 degrees in all deficient planes.-MET  4) Increase UE strength by 1/2 MMT grade in all deficient planes.-Partially met  5) Improve joint mobility in cervical spine to normal -Partially met    Long Term Goals: to be achieved by discharge  1) FOTO equal to or greater than expected.-Not met  2) Patient to be independent with comprehensive HEP.-MET  3) Cervical spine ROM WNL all planes to improve a/iadls.-Partially met  4) Increase UE strength to 1 MMT grade in all planes to improve a/iadls.-Partially met        Plan  Hold from PT to HEP       Subjective Evaluation    History of Present Illness  Mechanism of injury: History of Current Injury: Pt returns to PT secondary to chronic headaches, which are cervicogenic in nature. Pt was last discharged in June. He has been able to play golf; however, started to experience headaches on a regular basis which aren't improving with exercise routine and medication regimen (OTC advil/tylenol). Pt would like advice for steps to take going forward. Of note, patient has experienced headaches for 4+ years. They vary in intensity and frequency. Pt saw Dr. Michaels at  neurology when symptoms were less severe. It was recommended he return should symptoms worsen. Pt had MRI in April of last year with results below.  Unfortunately, patient's son passed away last month. Pt has increased lexapro as a result of this.   Headaches frequency: Primarily with golfing (immediately after golf). Pt requires advil (anywhere from 2-6 pills throughout the day). Generally, headaches can last a least a day.    Pain location/Descriptors: P1: Pain in suboccipital region. P2: pain from upper trap to shoulders. Pain radiates together.   Aggravating factors: Sleeping, driving, golfing   Easing factors: Advil helps less intensity  24 HR pattern: Worsen in  AM. Pt gets very little sleep at night time.  Pt does take a nap.     Imaging: MRI: 1. Multilevel disc degeneration and cervical spondylosis, greatest at C4-5 and   C5-6.   2. At C4-5, disc ridge complex with uncovertebral arthropathy on the right side   and small central disc herniation. There is mild impression of the ventral   thecal sac with moderate to severe right and mild left neural foraminal   narrowing.   3. At C5-6, disc ridge complex with uncovertebral arthropathy. There is mild   impression of the ventral thecal sac with severe bilateral neural foraminal   narrowing.      Special Questions: Denies any N&T in face are arms. Denies ataxia or balance issues, dysphagia, dysarthria, drop attacks.   Patient goals:  Help manage symptoms.   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   Pt lost 7lbs of wanted weight loss.      Pt does admit to having anxiety. Pt does take lexapro after his son passed away last month.   Pt has a wife who has MS and is a caretaker  Has grandchildren.         Patient Goals  Patient goals for therapy: decreased pain, increased motion, increased strength and independence with ADLs/IADLs    Pain  Current pain ratin  At worst pain ratin (Headaches can range from 4-8)      Diagnostic Tests  MRI studies: normal  Treatments  Previous treatment: medication and physical therapy  Current treatment: medication  Current treatment comments: home exercises.       Objective     Neurological Testing     Sensation   Cervical/Thoracic   Left   Intact: light touch    Right   Intact: light touch    Reflexes   Left   Biceps (C5/C6): normal (2+)  Brachioradialis (C6): normal (2+)  Triceps (C7): normal (2+)  Edwards's reflex: negative    Right   Biceps (C5/C6): normal (2+)  Brachioradialis (C6): normal (2+)  Triceps (C7): normal (2+)  Edwards's reflex: negative    Active Range of Motion   Cervical/Thoracic Spine       Cervical  Subcranial protraction:   "WFL   Subcranial retraction: Active cervical subcranial retraction: Unable to reach neutral.   Flexion: 80 degrees   Extension: 50 degrees      Left lateral flexion: 30 degrees      Right lateral flexion: 35 degrees      Left rotation: 75 degrees  Right rotation: 78 degrees       Additional Active Range of Motion Details  AA ROM: 46 R/42L B       Strength/Myotome Testing   Cervical Spine     Left   Interossei strength (t1): 5    Right   Interossei strength (t1): 5    Left Shoulder     Planes of Motion   Flexion: 5   Abduction: 4+   External rotation at 0°: 4+     Right Shoulder     Planes of Motion   Flexion: 5   Abduction: 4+   External rotation at 0°: 4+     Left Elbow   Flexion: 5  Extension: 5    Right Elbow   Flexion: 5  Extension: 5    Left Wrist/Hand   Wrist extension: 5  Wrist flexion: 5  Thumb extension: 5    Right Wrist/Hand   Wrist extension: 5  Wrist flexion: 5  Thumb extension: 5    Tests   Cervical   Negative alar ligament test, Sharp-Kerline test, transverse ligament test and VBI.     Additional Tests Details  *AA mobility: 38 deg bilaterally  *Tenderness in Subocciptal muscle  *Moderate limitation in OA bilaterally   *Moderate limitation at AA joint    C/S Lateral glides: mild hypomobility at lower c/s  C/S rotational mobs: Mild hypomobility at upper c/s             Precautions:  Skin CA, Anxiety, DDD, Chronic headaches      Manuals 9/25 10/1 10/8 10/16 10/29 11/8 11/26      SOR with traction ELENI KNOWLES JJEANIE      IASTM using LLL to SHAILESH + trapezius 3:23 to B UT 3' to B UT 3' to B UT 3' to B UT 3' to B UT 3' to B UT 3' to B UT      STM to upper trap  ELENI QUESADAK  JK      OA mobilizations             Neuro Re-Ed             Seated c/s retraction             Lower trap setting & activation                                                                               Ther Ex             UBE 3'/3' 3'/3' 3'/3' 3'/3' 3'/3' 3'/3' 3'/3'      Cervical snag rotation 10x on ea for 5\" 10x on ea for 5\"   " "        UT stretch    5x10\" 8x5\" 8x5\" B        LS stretch   5x10\"  8x5\" B 8x5\" B        SHAILESH stretch              Foam roller thoracic decompression             Quad thoracic rotation 10x on ea 10x on c/ foam roller  Hand on head rot in and out x 5 on ea         Self OA mob  4x on ea for 5\" holds 4x on ea for 5\" holds 4x on ea for 5\" holds 10x on ea for 5\" holds 10x on ea for 5\" holds        Cat/cow 10x  10x  10x         Ther Activity             Wall push up with plus   6x            Pec flys at incline bench      2x10 3# dbs       Standing db chest fly      2x10 2#       Over head press   2x6 c/ 3# dbs  1x10, 1x6 c/ 3# dbs  1x10, 1x7 c/ 3# dbs         Gait Training                                       Modalities                                       1:1 with PT from [id number]  Pt was re-evaluated today x 25 minutes with consultation.                    "

## 2024-11-26 ENCOUNTER — EVALUATION (OUTPATIENT)
Dept: PHYSICAL THERAPY | Facility: CLINIC | Age: 74
End: 2024-11-26
Payer: MEDICARE

## 2024-11-26 DIAGNOSIS — G44.86 CERVICOGENIC HEADACHE: Primary | ICD-10-CM

## 2024-11-26 PROCEDURE — 97110 THERAPEUTIC EXERCISES: CPT | Performed by: PHYSICAL THERAPIST

## 2024-11-26 PROCEDURE — 97535 SELF CARE MNGMENT TRAINING: CPT | Performed by: PHYSICAL THERAPIST

## 2024-11-26 PROCEDURE — 97140 MANUAL THERAPY 1/> REGIONS: CPT | Performed by: PHYSICAL THERAPIST

## 2025-01-20 ENCOUNTER — OFFICE VISIT (OUTPATIENT)
Dept: PHYSICAL THERAPY | Facility: CLINIC | Age: 75
End: 2025-01-20
Payer: MEDICARE

## 2025-01-20 DIAGNOSIS — G44.86 CERVICOGENIC HEADACHE: Primary | ICD-10-CM

## 2025-01-20 PROCEDURE — 97162 PT EVAL MOD COMPLEX 30 MIN: CPT | Performed by: PHYSICAL THERAPIST

## 2025-01-20 PROCEDURE — 97110 THERAPEUTIC EXERCISES: CPT | Performed by: PHYSICAL THERAPIST

## 2025-01-20 NOTE — LETTER
2025    Jose Luis Garcia MD   [facility]  [address]  [facility] [address]    Patient: Jacobo Bliss   YOB: 1950   Date of Visit: 2025     Encounter Diagnosis     ICD-10-CM    1. Cervicogenic headache  G44.86           Dear Dr. Garcia:    Thank you for your recent referral of Jacobo Bliss. Please review the attached evaluation summary from Jacobo's recent visit.     Please verify that you agree with the plan of care by signing the attached order.     If you have any questions or concerns, please do not hesitate to call.     I sincerely appreciate the opportunity to share in the care of one of your patients and hope to have another opportunity to work with you in the near future.       Sincerely,    Hany Guzman, PT      Referring Provider:      I certify that I have read the below Plan of Care and certify the need for these services furnished under this plan of treatment while under my care.                    Jose Luis Garcia MD   [facility] [address]  [facility] [address]  Via Fax: [phone]          PT Evaluation     Today's date: 2025  Patient name: Jacobo Bliss  : 1950  MRN: [id number]  Referring provider: Jose Luis Garcia MD  Dx:   Encounter Diagnosis     ICD-10-CM    1. Cervicogenic headache  G44.86           Start Time: 930  Stop Time: 1015  Total time in clinic (min): 45 minutes    Assessment  Impairments: abnormal muscle firing, abnormal muscle tone, abnormal or restricted ROM, activity intolerance, lacks appropriate home exercise program, pain with function and poor posture     Assessment details: Jacobo Bliss is a 74 y.o. male who presents with signs and symptoms consistent of chronic cervicogenic headaches that vary in frequency and intensity. He has had a headache the past 5 days after attempting stretches and ROM exercises prescribed previously. Pt notes sharp burning pain in bilateral UT that radiates to the occiput. Notes muscle spasm in left upper trap,  which feels tight. Pt was discharge from PT in November (2 months ago) year after his symptoms stabilized. He had good results with low level laser, in conjunction with manual therapy and exercise.  Patient presents with bilateral neck pain (left worse than right), decreased cervical ROM, joint mobility restrictions, and flexibility restrictions. Due to these impairments, Patient has difficulty performing ADLs that involve lifting, driving, prolonged sitting, etc. Patient would benefit from skilled physical therapy to address the impairments, improve their level of function, and to improve their overall quality of life.     Primary movement impairments:   1) Increased tone & tenderness in left upper trap region  2) Upper thoracic hypomobility T1/2   2) Decreased Upper cervical mobility, including AA and OA joints  3) Decreased endurance of scapular stabilizers.       Understanding of Dx/Px/POC: good     Prognosis: good    Goals  Short Term Goals: to be achieved by 4 weeks  1) Patient to be independent with basic HEP.  2) Decrease pain to 3/10 at its worst.  3) Increase cervical spine ROM by 5-10 degrees in all deficient planes.  4) Increase UE strength by 1/2 MMT grade in all deficient planes.  5) Improve joint mobility in cervical spine to normal     Long Term Goals: to be achieved by discharge  1) FOTO equal to or greater than expected.  2) Patient to be independent with comprehensive HEP.  3) Cervical spine ROM WNL all planes to improve a/iadls.  4) Increase UE strength to 1 MMT grade in all planes to improve a/iadls.  5) Lifting is improved to maximal level of function       Plan  Patient would benefit from: skilled physical therapy  Planned modality interventions: low level laser therapy    Planned therapy interventions: joint mobilization, manual therapy, neuromuscular re-education, therapeutic activities, therapeutic exercise and home exercise program    Frequency: 1x per week for 4-6 weeks.  Treatment plan  discussed with: patient        Subjective Evaluation    History of Present Illness  Mechanism of injury: History of Current Injury: Pt is a self referral to PT for chronic cervicogenic headaches. Pt has been a patient in the past with successful outcomes through conservative treatment. He notes having a headache that has lasted the past 5 days and requested re-assessment. Pt has been taking over the counter advil with some relief. Pt denies any mechanism of injury or trauma. Pt felt better after stopping chocolate consumption. He is gradually feeling better but symptoms were severe last Tuesday/Wednesday.   Pain location/Descriptors: Left upper trap tightness which radiates into the occiput. Pt felt hot needle symptoms in the region with goes to the occiput. Denies any discomfort past the occiput  Aggravating factors: Prolonged sitting  Easing factors: TENS, Theragun, previously prescribed HEP,   24 HR pattern: Movement dependent.   Imaging: Imaging: MRI: 1. Multilevel disc degeneration and cervical spondylosis, greatest at C4-5 and   C5-6.   2. At C4-5, disc ridge complex with uncovertebral arthropathy on the right side   and small central disc herniation. There is mild impression of the ventral   thecal sac with moderate to severe right and mild left neural foraminal   narrowing.   3. At C5-6, disc ridge complex with uncovertebral arthropathy. There is mild   impression of the ventral thecal sac with severe bilateral neural foraminal   narrowing.     Special Questions: Denies any N&T in the extremities, dipolpia, drop attacks, dysphagia, dysarthria, nystagmus, ataxia, weakness, facial numbness, bowel/bladder abnormalities.    Patient goals:  Manage symptoms to return to baseline  Hobbies/Interest: Golf (unable to golf d/t weather), home improvement projects.   Occupation: Retired       Patient Goals  Patient goals for therapy: decreased pain, decreased edema, independence with ADLs/IADLs, improved balance and  increased motion    Pain  Current pain ratin  At worst pain ratin      Diagnostic Tests  Abnormal MRI: Previous: see above.  Treatments  Current treatment: chiropractic and medication        Objective     Postural Observations  Seated posture: poor    Additional Postural Observation Details  Forward head/rounded shoulders     Active Range of Motion   Cervical/Thoracic Spine       Cervical  Subcranial protraction:  WFL   Subcranial retraction: Active cervical subcranial retraction: Unable to reach neutral.   Flexion: Neck active flexion: 70.   Extension: Neck active extension: 35.     with pain  Left lateral flexion: 25 degrees      Right lateral flexion: 30 degrees      Left rotation: 75 degrees with pain  Right rotation: 60 degrees    with pain    Strength/Myotome Testing   Cervical Spine     Left   Interossei strength (t1): 5    Right   Interossei strength (t1): 5    Left Shoulder     Planes of Motion   Flexion: 5   Abduction: 5   External rotation at 0°: 5     Right Shoulder     Planes of Motion   Flexion: 5   Abduction: 5   External rotation at 0°: 5     Left Elbow   Flexion: 5  Extension: 5    Right Elbow   Flexion: 5  Extension: 5    Left Wrist/Hand   Wrist extension: 5  Wrist flexion: 5  Thumb extension: 5    Right Wrist/Hand   Wrist extension: 5  Wrist flexion: 5  Thumb extension: 5    Tests   Cervical   Negative alar ligament test, Sharp-Kerline test and VBI.     Additional Tests Details  *AA mobility:   L: 28 deg  R: 40 deg    *OA mobility deficit    *Upper thoracic hypomobility: severe at T1/T2    *Trigger point present in bilateral UT, L more prominent and uncomfortable  *Mild 1st rib mobility deficit B    Cervical lateral glide: mild mobility restriction  Cervical rotational glide: mild mobility restriction      *Scapular strength:   Lower trap: poor B  Rhomboid: fair      Flowsheet Rows      Flowsheet Row Most Recent Value   PT/OT G-Codes    Current Score 49   Projected Score 67                Precautions:  Skin CA, Anxiety, DDD, Chronic headaches      Manuals 1/20            IASTM using LLL UT & Paraspinals 5' total B                                                   Neuro Re-Ed             Lower trap activation              Scap retraction with ER TB                                                                               Ther Ex             Supine thoracic extension with c/s retraction 5x towel roll under T/S             Supine upper thoracic extension UE flexion hold c/ towel roll x10             Cervical snag nv            UT stretch             LS stretch              UBE                                       Ther Activity                                       Gait Training                                       Modalities                                        1:1 with PT from [id number]  [email or web address]  Access Code: [email or web address]

## 2025-01-20 NOTE — PROGRESS NOTES
PT Evaluation     Today's date: 2025  Patient name: Jacobo Bliss  : 1950  MRN: [id number]  Referring provider: Jose Luis Garcia MD  Dx:   Encounter Diagnosis     ICD-10-CM    1. Cervicogenic headache  G44.86           Start Time: 930  Stop Time: 1015  Total time in clinic (min): 45 minutes    Assessment  Impairments: abnormal muscle firing, abnormal muscle tone, abnormal or restricted ROM, activity intolerance, lacks appropriate home exercise program, pain with function and poor posture     Assessment details: Jacobo Bliss is a 74 y.o. male who presents with signs and symptoms consistent of chronic cervicogenic headaches that vary in frequency and intensity. He has had a headache the past 5 days after attempting stretches and ROM exercises prescribed previously. Pt notes sharp burning pain in bilateral UT that radiates to the occiput. Notes muscle spasm in left upper trap, which feels tight. Pt was discharge from PT in November (2 months ago) year after his symptoms stabilized. He had good results with low level laser, in conjunction with manual therapy and exercise.  Patient presents with bilateral neck pain (left worse than right), decreased cervical ROM, joint mobility restrictions, and flexibility restrictions. Due to these impairments, Patient has difficulty performing ADLs that involve lifting, driving, prolonged sitting, etc. Patient would benefit from skilled physical therapy to address the impairments, improve their level of function, and to improve their overall quality of life.     Primary movement impairments:   1) Increased tone & tenderness in left upper trap region  2) Upper thoracic hypomobility T1/2   2) Decreased Upper cervical mobility, including AA and OA joints  3) Decreased endurance of scapular stabilizers.       Understanding of Dx/Px/POC: good     Prognosis: good    Goals  Short Term Goals: to be achieved by 4 weeks  1) Patient to be independent with basic HEP.  2) Decrease pain  to 3/10 at its worst.  3) Increase cervical spine ROM by 5-10 degrees in all deficient planes.  4) Increase UE strength by 1/2 MMT grade in all deficient planes.  5) Improve joint mobility in cervical spine to normal     Long Term Goals: to be achieved by discharge  1) FOTO equal to or greater than expected.  2) Patient to be independent with comprehensive HEP.  3) Cervical spine ROM WNL all planes to improve a/iadls.  4) Increase UE strength to 1 MMT grade in all planes to improve a/iadls.  5) Lifting is improved to maximal level of function       Plan  Patient would benefit from: skilled physical therapy  Planned modality interventions: low level laser therapy    Planned therapy interventions: joint mobilization, manual therapy, neuromuscular re-education, therapeutic activities, therapeutic exercise and home exercise program    Frequency: 1x per week for 4-6 weeks.  Treatment plan discussed with: patient        Subjective Evaluation    History of Present Illness  Mechanism of injury: History of Current Injury: Pt is a self referral to PT for chronic cervicogenic headaches. Pt has been a patient in the past with successful outcomes through conservative treatment. He notes having a headache that has lasted the past 5 days and requested re-assessment. Pt has been taking over the counter advil with some relief. Pt denies any mechanism of injury or trauma. Pt felt better after stopping chocolate consumption. He is gradually feeling better but symptoms were severe last Tuesday/Wednesday.   Pain location/Descriptors: Left upper trap tightness which radiates into the occiput. Pt felt hot needle symptoms in the region with goes to the occiput. Denies any discomfort past the occiput  Aggravating factors: Prolonged sitting  Easing factors: TENS, Theragun, previously prescribed HEP,   24 HR pattern: Movement dependent.   Imaging: Imaging: MRI: 1. Multilevel disc degeneration and cervical spondylosis, greatest at C4-5 and    C5-6.   2. At C4-5, disc ridge complex with uncovertebral arthropathy on the right side   and small central disc herniation. There is mild impression of the ventral   thecal sac with moderate to severe right and mild left neural foraminal   narrowing.   3. At C5-6, disc ridge complex with uncovertebral arthropathy. There is mild   impression of the ventral thecal sac with severe bilateral neural foraminal   narrowing.     Special Questions: Denies any N&T in the extremities, dipolpia, drop attacks, dysphagia, dysarthria, nystagmus, ataxia, weakness, facial numbness, bowel/bladder abnormalities.    Patient goals:  Manage symptoms to return to baseline  Hobbies/Interest: Golf (unable to golf d/t weather), home improvement projects.   Occupation: Retired       Patient Goals  Patient goals for therapy: decreased pain, decreased edema, independence with ADLs/IADLs, improved balance and increased motion    Pain  Current pain ratin  At worst pain ratin      Diagnostic Tests  Abnormal MRI: Previous: see above.  Treatments  Current treatment: chiropractic and medication        Objective     Postural Observations  Seated posture: poor    Additional Postural Observation Details  Forward head/rounded shoulders     Active Range of Motion   Cervical/Thoracic Spine       Cervical  Subcranial protraction:  WFL   Subcranial retraction: Active cervical subcranial retraction: Unable to reach neutral.   Flexion: Neck active flexion: 70.   Extension: Neck active extension: 35.     with pain  Left lateral flexion: 25 degrees      Right lateral flexion: 30 degrees      Left rotation: 75 degrees with pain  Right rotation: 60 degrees    with pain    Strength/Myotome Testing   Cervical Spine     Left   Interossei strength (t1): 5    Right   Interossei strength (t1): 5    Left Shoulder     Planes of Motion   Flexion: 5   Abduction: 5   External rotation at 0°: 5     Right Shoulder     Planes of Motion   Flexion: 5   Abduction: 5    External rotation at 0°: 5     Left Elbow   Flexion: 5  Extension: 5    Right Elbow   Flexion: 5  Extension: 5    Left Wrist/Hand   Wrist extension: 5  Wrist flexion: 5  Thumb extension: 5    Right Wrist/Hand   Wrist extension: 5  Wrist flexion: 5  Thumb extension: 5    Tests   Cervical   Negative alar ligament test, Sharp-Kerline test and VBI.     Additional Tests Details  *AA mobility:   L: 28 deg  R: 40 deg    *OA mobility deficit    *Upper thoracic hypomobility: severe at T1/T2    *Trigger point present in bilateral UT, L more prominent and uncomfortable  *Mild 1st rib mobility deficit B    Cervical lateral glide: mild mobility restriction  Cervical rotational glide: mild mobility restriction      *Scapular strength:   Lower trap: poor B  Rhomboid: fair      Flowsheet Rows      Flowsheet Row Most Recent Value   PT/OT G-Codes    Current Score 49   Projected Score 67               Precautions:  Skin CA, Anxiety, DDD, Chronic headaches      Manuals 1/20            IASTM using LLL UT & Paraspinals 5' total B                                                   Neuro Re-Ed             Lower trap activation              Scap retraction with ER TB                                                                               Ther Ex             Supine thoracic extension with c/s retraction 5x towel roll under T/S             Supine upper thoracic extension UE flexion hold c/ towel roll x10             Cervical snag nv            UT stretch             LS stretch              UBE                                       Ther Activity                                       Gait Training                                       Modalities                                        1:1 with PT from [id number]  [email or web address]  Access Code: [email or web address]

## 2025-01-29 ENCOUNTER — OFFICE VISIT (OUTPATIENT)
Dept: PHYSICAL THERAPY | Facility: CLINIC | Age: 75
End: 2025-01-29
Payer: MEDICARE

## 2025-01-29 DIAGNOSIS — G44.86 CERVICOGENIC HEADACHE: Primary | ICD-10-CM

## 2025-01-29 PROCEDURE — 97110 THERAPEUTIC EXERCISES: CPT | Performed by: PHYSICAL THERAPIST

## 2025-01-29 PROCEDURE — 97140 MANUAL THERAPY 1/> REGIONS: CPT | Performed by: PHYSICAL THERAPIST

## 2025-01-29 NOTE — PROGRESS NOTES
"Daily Note     Today's date: 2025  Patient name: Jacobo Bliss  : 1950  MRN: [id number]  Referring provider: Jose Luis Garcia MD  Dx:   Encounter Diagnosis     ICD-10-CM    1. Cervicogenic headache  G44.86                      Subjective: Pt reports feeling the best he has today over the past 10 days. He was attempting to fix up his garage but realized this type of work singificantly increased his symptoms so he stopped. Pt has been going to the chiro office to assist with his recovery. Jacobo notes that the strengthening exercises are too much so he stopped them. He is mainly performing AROM and gentle stretching. He was unable to perform the upper thoracic mobility exercise that was prescribed secondary to immediate headache reproduction.       Objective: See treatment diary below      Assessment: Increased tone present in left UT compared to right UT. Utilized STM to help improve this region. Tolerated treatment fair. Patient would benefit from continued PT. Focused treatment on primary movement impairments identified at IE.       Plan: Continue per plan of care.      Precautions:  Skin CA, Anxiety, DDD, Chronic headaches      Manuals            IASTM using LLL UT & Paraspinals 5' total B UT & Paraspinals 5' total B           STM to B UT  5'                                     Neuro Re-Ed             Lower trap activation              Scap retraction with ER TB              Serratus press   10x                                                                Ther Ex             Supine thoracic extension with c/s retraction 5x towel roll under T/S  Hold           Supine upper thoracic extension UE flexion hold c/ towel roll x10  UE flexion hold c/ towel roll x8           Cervical snag nv            UT stretch  Strap to block scap- 10x5\"            LS stretch              UBE  3'/3'           Thoracic rot in sitting c/ dowel  10x5\"           T/S ext seated  Foam x10            Ther Activity           "                             Gait Training                                       Modalities                                       1:1 with PT from [id number]

## 2025-02-03 ENCOUNTER — OFFICE VISIT (OUTPATIENT)
Dept: PHYSICAL THERAPY | Facility: CLINIC | Age: 75
End: 2025-02-03
Payer: MEDICARE

## 2025-02-03 DIAGNOSIS — G44.86 CERVICOGENIC HEADACHE: Primary | ICD-10-CM

## 2025-02-03 PROCEDURE — 97110 THERAPEUTIC EXERCISES: CPT | Performed by: PHYSICAL THERAPIST

## 2025-02-03 PROCEDURE — 97140 MANUAL THERAPY 1/> REGIONS: CPT | Performed by: PHYSICAL THERAPIST

## 2025-02-03 NOTE — PROGRESS NOTES
"Daily Note     Today's date: 2/3/2025  Patient name: Jacobo Bliss  : 1950  MRN: [id number]  Referring provider: Jose Luis Garcia MD  Dx:   Encounter Diagnosis     ICD-10-CM    1. Cervicogenic headache  G44.86                      Subjective: Pt reports not having much difficulty  over the weekend with his neck or headaches. However, woke up with a 5/10 headache  this morning.       Objective: See treatment diary below      Assessment: Tolerated treatment well. Implemented thoracic  into POC with good tolerance. Resumed cervical snag secondary to upper cervical stiffness and ROM deficits present today. Pt was fairly stiff and hypomobile during manual assessment today. This improved with manual intervention. No adverse effects of prescribed exercises or manual intervention. Patient would benefit from continued PT.       Plan: Continue per plan of care.      Precautions:  Skin CA, Anxiety, DDD, Chronic headaches      Manuals  2/3          IASTM using LLL UT & Paraspinals 5' total B UT & Paraspinals 5' total B UT & Paraspinals 5' total B          STM to B UT  5' 5'          SOR   3'          Lower T/S CPA   Gr II          Neuro Re-Ed             Lower trap activation              Scap retraction with ER TB              Serratus press   10x  10x                                                              Ther Ex             Supine thoracic extension with c/s retraction 5x towel roll under T/S  Hold           Supine upper thoracic extension UE flexion hold c/ towel roll x10  UE flexion hold c/ towel roll x8           Cervical snag nv  10x5\"          UT stretch  Strap to block scap- 10x5\"  Strap to block scap- 10x5\"           LS stretch              UBE  3'/3' 3'/3'          Thoracic rot in sitting c/ dowel  10x5\" 10x5\"          T/S ext seated  Foam x10            Ther Activity             Standing pec fly   Reviewed for HEP                       Gait Training                                     "   Modalities                                       1:1 with PT from [id number]

## 2025-02-12 ENCOUNTER — APPOINTMENT (OUTPATIENT)
Dept: PHYSICAL THERAPY | Facility: CLINIC | Age: 75
End: 2025-02-12
Payer: MEDICARE

## 2025-02-13 ENCOUNTER — OFFICE VISIT (OUTPATIENT)
Dept: PHYSICAL THERAPY | Facility: CLINIC | Age: 75
End: 2025-02-13
Payer: MEDICARE

## 2025-02-13 DIAGNOSIS — G44.86 CERVICOGENIC HEADACHE: Primary | ICD-10-CM

## 2025-02-13 PROCEDURE — 97110 THERAPEUTIC EXERCISES: CPT | Performed by: PHYSICAL THERAPIST

## 2025-02-13 PROCEDURE — 97112 NEUROMUSCULAR REEDUCATION: CPT | Performed by: PHYSICAL THERAPIST

## 2025-02-13 PROCEDURE — 97140 MANUAL THERAPY 1/> REGIONS: CPT | Performed by: PHYSICAL THERAPIST

## 2025-02-13 NOTE — PROGRESS NOTES
"Daily Note     Today's date: 2025  Patient name: Jacobo Bliss  : 1950  MRN: [id number]  Referring provider: Jose Luis Garcia MD  Dx:   Encounter Diagnosis     ICD-10-CM    1. Cervicogenic headache  G44.86                      Subjective: Pt reports noticeable improvements since starting PT. Headaches have been manageable.       Objective: See treatment diary below      Assessment: Tolerated treatment well. Pt has been progressing well in terms of pain management. No adverse effect of prescribed exercises. Pt should continue HEP as prescribed. Cueing provided throughout exercises. Patient would benefit from continued PT      Plan: Continue per plan of care.      Precautions:  Skin CA, Anxiety, DDD, Chronic headaches      Manuals 1/20 1/29 2/3 2/13         IASTM using LLL UT & Paraspinals 5' total B UT & Paraspinals 5' total B UT & Paraspinals 5' total B UT & Paraspinals 5' total B         STM to B UT  5' 5' 5'         SOR   3' 3'         Lower T/S CPA   Gr II Gr II         Neuro Re-Ed             Lower trap activation              Scap retraction with ER TB              Serratus press   10x  10x 10x                                                             Ther Ex             Supine thoracic extension with c/s retraction 5x towel roll under T/S  Hold           Supine upper thoracic extension UE flexion hold c/ towel roll x10  UE flexion hold c/ towel roll x8 UE flexion hold c/ towel roll x8 UE flexion hold c/ towel roll x10 for 5\" - B/L Arm movements          Cervical snag nv  10x5\"          UT stretch  Strap to block scap- 10x5\"  Strap to block scap- 10x5\"  Strap to block scap- 10x5\"          LS stretch              UBE  3'/3' 3'/3' 3'/3'         Thoracic rot in sitting c/ dowel  10x5\" 10x5\" 10x5\"          T/S ext seated  Foam x10            Ther Activity             Standing pec fly   Reviewed for HEP                       Gait Training                                       Modalities                    "                    1:1 with PT from [id number]

## 2025-02-19 ENCOUNTER — OFFICE VISIT (OUTPATIENT)
Dept: PHYSICAL THERAPY | Facility: CLINIC | Age: 75
End: 2025-02-19
Payer: MEDICARE

## 2025-02-19 DIAGNOSIS — G44.86 CERVICOGENIC HEADACHE: Primary | ICD-10-CM

## 2025-02-19 PROCEDURE — 97140 MANUAL THERAPY 1/> REGIONS: CPT | Performed by: PHYSICAL THERAPIST

## 2025-02-19 PROCEDURE — 97110 THERAPEUTIC EXERCISES: CPT | Performed by: PHYSICAL THERAPIST

## 2025-02-19 PROCEDURE — 97112 NEUROMUSCULAR REEDUCATION: CPT | Performed by: PHYSICAL THERAPIST

## 2025-02-19 NOTE — PROGRESS NOTES
"Daily Note     Today's date: 2025  Patient name: Jacobo Bliss  : 1950  MRN: [id number]  Referring provider: Jose Luis Garcia MD  Dx:   Encounter Diagnosis     ICD-10-CM    1. Cervicogenic headache  G44.86                      Subjective: Pt reports feeling terrible the past couple of days. He spent 2 days dry walling, spackling, and fixing his garage. Went up/down 250 steps throughout the day. He notes tension and discomfort in all primary areas. Pt also notes he had a headache after last PT session. Due to the ramping up of discomfort and his activity level, he hasn't performed many of the exercises at home. He notes the Lower t/s  seemed to have reproduced a headache after last session.       Objective: See treatment diary below      Assessment: Modified treatment and omitted lower t/s mobilizations. Tolerated treatment well. Pt still fairly sensitive to cervical and upper thoracic region. Continued with gentle ROM and activation exercises. Pt seems to be over working this region with the activity at home (I.e. fixing his garage). However, no increase in tone in UT and SHAILESH. Implemented lower trap activation.  Pt would benefit from continued PT. No headache upon leaving today.       Plan: Continue per plan of care. 1 more session for re-evaluation in 2 weeks.      Precautions:  Skin CA, Anxiety, DDD, Chronic headaches      Manuals 1/20 1/29 2/3 2/13 2/19        IASTM using LLL UT & Paraspinals 5' total B UT & Paraspinals 5' total B UT & Paraspinals 5' total B UT & Paraspinals 5' total B UT & Paraspinals 5' total B        STM to B UT  5' 5' 5' 5'        SOR   3' 3' 3'        Lower T/S CPA   Gr II Gr II NP        Neuro Re-Ed             Lower trap activation      10x5\"         Scap retraction with ER TB              Serratus press   10x  10x 10x 10x                                                            Ther Ex             Supine thoracic extension with c/s retraction 5x towel roll under T/S  Hold   " "        Supine upper thoracic extension UE flexion hold c/ towel roll x10  UE flexion hold c/ towel roll x8 UE flexion hold c/ towel roll x8 UE flexion hold c/ towel roll x10 for 5\" - B/L Arm movements          Cervical snag nv  10x5\"          UT stretch  Strap to block scap- 10x5\"  Strap to block scap- 10x5\"  Strap to block scap- 10x5\"  Strap to block scap- 10x5\"         LS stretch              UBE  3'/3' 3'/3' 3'/3' 3'/3'        Thoracic rot in sitting c/ dowel  10x5\" 10x5\" 10x5\"  10x5\"         T/S ext seated  Foam x10            Ther Activity             Standing pec fly   Reviewed for HEP                       Gait Training                                       Modalities                                       1:1 with PT from 730-808am           "

## 2025-02-26 ENCOUNTER — APPOINTMENT (OUTPATIENT)
Dept: PHYSICAL THERAPY | Facility: CLINIC | Age: 75
End: 2025-02-26
Payer: MEDICARE

## 2025-03-05 ENCOUNTER — EVALUATION (OUTPATIENT)
Dept: PHYSICAL THERAPY | Facility: CLINIC | Age: 75
End: 2025-03-05
Payer: MEDICARE

## 2025-03-05 DIAGNOSIS — G44.86 CERVICOGENIC HEADACHE: Primary | ICD-10-CM

## 2025-03-05 PROCEDURE — 97140 MANUAL THERAPY 1/> REGIONS: CPT | Performed by: PHYSICAL THERAPIST

## 2025-03-05 PROCEDURE — 97112 NEUROMUSCULAR REEDUCATION: CPT | Performed by: PHYSICAL THERAPIST

## 2025-03-05 PROCEDURE — 97110 THERAPEUTIC EXERCISES: CPT | Performed by: PHYSICAL THERAPIST

## 2025-03-05 NOTE — PROGRESS NOTES
AUTHORIZATION FOR SURGICAL OPERATION OR OTHER PROCEDURE    1. I hereby authorize Dr. Krishan Bautista, and [place] [facility] staff assigned to my case to perform the following operation and/or procedure at the [place] [facility]:    Insertion of Intrauterine Device    2. My physician has explained the nature and purpose of the operation or other procedure, possible alternative methods of treatment, the risks involved, and the possibility of complication to me. I acknowledge that no guarantee has been made as to the result that may be obtained. 3.  I recognize that, during the course of this operation, or other procedure, unforseen conditions may necessitate additional or different procedure than those listed above. I, therefore, further authorize and request that the above named physician, his/her physician assistants or designees perform such procedures as are, in his/her professional opinion, necessary and desirable. 4.  Any tissue or organs removed in the operation or other procedure may be disposed of by and at the discretion of the [facility] and [facility]. 5.  I understand that in the event of a medical emergency, I will be transported by local paramedics to [facility] or other hospital emergency department. 6.  I certify that I have read and fully understand the above consent to operation and/or other procedure. 7.  I acknowledge that my physician has explained sedation/analgesia administration to me including the risks and benefits. I consent to the administration of sedation/analgesia as may be necessary or desirable in the judgement of my physician. Witness signature: ___________________________________________________ Date:  05/30/2023                    Time:  ________ A. M.  P.M.        Patient Name:  ______________________________________________________  (please print)      Patient signature: PT-Discharge    Today's date: 3/5/2025  Patient name: Jacobo Bliss  : 1950  MRN: [id number]  Referring provider: Jose Luis Garcia MD  Dx:   Encounter Diagnosis     ICD-10-CM    1. Cervicogenic headache  G44.86                      Assessment      Assessment details: Jacobo Bliss is a 74 y.o. male who presents with signs and symptoms consistent of chronic cervicogenic headaches that vary in frequency and intensity. Jacobo reports good improvements in headaches and cervical discomfort over the last 6 sessions. All primary movement impairments have improved. He notes doing less exercises as he is more functional around the house (I.e., lifting drywall yesterday). Overall his headache intensity and frequency has improved. Considerable improvements present in ROM at cervical spine. Overall, patient has made progress toward all goals and is ready for discharge. Pt is welcome to return to PT if symptoms return.        Primary movement impairments:   1) Increased tone & tenderness in left upper trap region- Improved  2) Upper thoracic hypomobility T1/2 - Improved  2) Decreased Upper cervical mobility, including AA and OA joints- Improved  3) Decreased endurance of scapular stabilizers. - Improved      Understanding of Dx/Px/POC: good     Prognosis: good    Goals  Short Term Goals: to be achieved by 4 weeks  1) Patient to be independent with basic HEP.-MET  2) Decrease pain to 3/10 at its worst.-MET  3) Increase cervical spine ROM by 5-10 degrees in all deficient planes.-MET  4) Increase UE strength by 1/2 MMT grade in all deficient planes.-MET  5) Improve joint mobility in cervical spine to normal -MET    Long Term Goals: to be achieved by discharge  1) FOTO equal to or greater than expected.-MET  2) Patient to be independent with comprehensive HEP.-MET  3) Cervical spine ROM WNL all planes to improve a/iadls.-MET  4) Increase UE strength to 1 MMT grade in all planes to improve a/iadls.-MET  5) Lifting is improved to  ___________________________________________________             Relationship to Patient:           []  Parent    Responsible person                          []  Spouse  In case of minor or                    [] Other  _____________   Incompetent name:  __________________________________________________                               (please print)      _____________      Responsible person  In case of minor or  Incompetent signature:  _______________________________________________    Statement of Physician  My signature below affirms that prior to the time of the procedure, I have explained to the patient and/or his/her guardian, the risks and benefits involved in the proposed treatment and any reasonable alternative to the proposed treatment. I have also explained the risks and benefits involved in the refusal of the proposed treatment and have answered the patient's questions.                         Date:  05/30/2023  Provider                      Signature:  __________________________________________________________       Time:  ___________ A.M    P.M. maximal level of function -MET      Plan  D/C to HEP.         Subjective Evaluation    History of Present Illness  Mechanism of injury: History of Current Injury: Pt is a self referral to PT for chronic cervicogenic headaches. Pt has been a patient in the past with successful outcomes through conservative treatment. He notes having a headache that has lasted the past 5 days and requested re-assessment. Pt has been taking over the counter advil with some relief. Pt denies any mechanism of injury or trauma. Pt felt better after stopping chocolate consumption. He is gradually feeling better but symptoms were severe last Tuesday/Wednesday.   Pain location/Descriptors: Left upper trap tightness which radiates into the occiput. Pt felt hot needle symptoms in the region with goes to the occiput. Denies any discomfort past the occiput  Aggravating factors: Prolonged sitting  Easing factors: TENS, Theragun, previously prescribed HEP,   24 HR pattern: Movement dependent.   Imaging: Imaging: MRI: 1. Multilevel disc degeneration and cervical spondylosis, greatest at C4-5 and   C5-6.   2. At C4-5, disc ridge complex with uncovertebral arthropathy on the right side   and small central disc herniation. There is mild impression of the ventral   thecal sac with moderate to severe right and mild left neural foraminal   narrowing.   3. At C5-6, disc ridge complex with uncovertebral arthropathy. There is mild   impression of the ventral thecal sac with severe bilateral neural foraminal   narrowing.     Special Questions: Denies any N&T in the extremities, dipolpia, drop attacks, dysphagia, dysarthria, nystagmus, ataxia, weakness, facial numbness, bowel/bladder abnormalities.    Patient goals:  Manage symptoms to return to baseline  Hobbies/Interest: Golf (unable to golf d/t weather), home improvement projects.   Occupation: Retired       Patient Goals  Patient goals for therapy: decreased pain, decreased edema, independence with  ADLs/IADLs, improved balance and increased motion    Pain  Current pain rating:Variable   At worst pain rating: Variable       Diagnostic Tests  Abnormal MRI: Previous: see above.  Treatments  Current treatment: chiropractic and medication        Objective     Postural Observations  Seated posture: poor    Additional Postural Observation Details  Forward head/rounded shoulders     Active Range of Motion   Cervical/Thoracic Spine       Cervical  Subcranial protraction:  WFL   Subcranial retraction: Active cervical subcranial retraction: Unable to reach neutral.   Flexion: Neck active flexion: 70.   Extension: Neck active extension: 50.     with pain  Left lateral flexion: 25 degrees      Right lateral flexion: 30 degrees      Left rotation: 70 degrees   Right rotation: 75 degrees       Strength/Myotome Testing   Cervical Spine     Left   Interossei strength (t1): 5    Right   Interossei strength (t1): 5    Left Shoulder     Planes of Motion   Flexion: 5   Abduction: 5   External rotation at 0°: 5     Right Shoulder     Planes of Motion   Flexion: 5   Abduction: 5   External rotation at 0°: 5     Left Elbow   Flexion: 5  Extension: 5    Right Elbow   Flexion: 5  Extension: 5    Left Wrist/Hand   Wrist extension: 5  Wrist flexion: 5  Thumb extension: 5    Right Wrist/Hand   Wrist extension: 5  Wrist flexion: 5  Thumb extension: 5    Tests   Cervical   Negative alar ligament test, Sharp-Kerline test and VBI.     Additional Tests Details  *AA mobility:   L: 50 deg  R: 50 deg    *OA mobility deficit    *Upper thoracic hypomobility: severe at T1/T2    *Trigger point present in bilateral UT, L more prominent and uncomfortable  *Mild 1st rib mobility deficit B    Cervical lateral glide: mild mobility restriction  Cervical rotational glide: mild mobility restriction      *Scapular strength:   Lower trap: poor B  Rhomboid: fair           Precautions:  Skin CA, Anxiety, DDD, Chronic headaches      Manuals 1/20 1/29 2/3 2/13 2/19  "3/5       IASTM using LLL UT & Paraspinals 5' total B UT & Paraspinals 5' total B UT & Paraspinals 5' total B UT & Paraspinals 5' total B UT & Paraspinals 5' total B UT & Paraspinals 5' total B       STM to B UT  5' 5' 5' 5' 5'       SOR   3' 3' 3' 3'       Lower T/S CPA   Gr II Gr II NP        Neuro Re-Ed             Lower trap activation      10x5\"         Scap retraction with ER TB              Serratus press   10x  10x 10x 10x                                                            Ther Ex             Supine thoracic extension with c/s retraction 5x towel roll under T/S  Hold           Supine upper thoracic extension UE flexion hold c/ towel roll x10  UE flexion hold c/ towel roll x8 UE flexion hold c/ towel roll x8 UE flexion hold c/ towel roll x10 for 5\" - B/L Arm movements          Cervical snag nv  10x5\"          UT stretch  Strap to block scap- 10x5\"  Strap to block scap- 10x5\"  Strap to block scap- 10x5\"  Strap to block scap- 10x5\"         LS stretch              UBE  3'/3' 3'/3' 3'/3' 3'/3' 3'/3'       Thoracic rot in sitting c/ dowel  10x5\" 10x5\" 10x5\"  10x5\"         T/S ext seated  Foam x10            Ther Activity             Standing pec fly   Reviewed for HEP                       Gait Training                                       Modalities                                       1:1 with PT from 730-808am  Pt was re-evaluated x 20 minutes           "